# Patient Record
Sex: FEMALE | Race: WHITE | NOT HISPANIC OR LATINO | Employment: FULL TIME | URBAN - METROPOLITAN AREA
[De-identification: names, ages, dates, MRNs, and addresses within clinical notes are randomized per-mention and may not be internally consistent; named-entity substitution may affect disease eponyms.]

---
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
## 2020-02-27 DIAGNOSIS — Z85.72 HISTORY OF B-CELL LYMPHOMA: ICD-10-CM

## 2020-10-09 ENCOUNTER — OFFICE VISIT (OUTPATIENT)
Dept: PEDIATRIC HEMATOLOGY/ONCOLOGY | Facility: OUTPATIENT CENTER | Age: 20
End: 2020-10-09
Payer: MEDICAID

## 2020-10-09 ENCOUNTER — HOSPITAL ENCOUNTER (OUTPATIENT)
Facility: MEDICAL CENTER | Age: 20
End: 2020-10-09
Attending: PEDIATRICS
Payer: MEDICAID

## 2020-10-09 VITALS
TEMPERATURE: 98.9 F | SYSTOLIC BLOOD PRESSURE: 115 MMHG | HEIGHT: 63 IN | DIASTOLIC BLOOD PRESSURE: 61 MMHG | WEIGHT: 120.15 LBS | OXYGEN SATURATION: 99 % | BODY MASS INDEX: 21.29 KG/M2 | HEART RATE: 76 BPM

## 2020-10-09 DIAGNOSIS — F19.10 SUBSTANCE ABUSE (HCC): ICD-10-CM

## 2020-10-09 DIAGNOSIS — Z85.72 HISTORY OF B-CELL LYMPHOMA: ICD-10-CM

## 2020-10-09 DIAGNOSIS — M85.80 OSTEOPENIA, UNSPECIFIED LOCATION: ICD-10-CM

## 2020-10-09 DIAGNOSIS — N39.0 URINARY TRACT INFECTION WITHOUT HEMATURIA, SITE UNSPECIFIED: ICD-10-CM

## 2020-10-09 DIAGNOSIS — R10.9 FLANK PAIN: ICD-10-CM

## 2020-10-09 DIAGNOSIS — M54.50 LOW BACK PAIN, UNSPECIFIED BACK PAIN LATERALITY, UNSPECIFIED CHRONICITY, UNSPECIFIED WHETHER SCIATICA PRESENT: ICD-10-CM

## 2020-10-09 DIAGNOSIS — E55.9 HYPOVITAMINOSIS D: ICD-10-CM

## 2020-10-09 LAB
25(OH)D3 SERPL-MCNC: 32 NG/ML (ref 30–100)
ALBUMIN SERPL BCP-MCNC: 4.8 G/DL (ref 3.2–4.9)
ALBUMIN/GLOB SERPL: 2.1 G/DL
ALP SERPL-CCNC: 94 U/L (ref 30–99)
ALT SERPL-CCNC: 5 U/L (ref 2–50)
ANION GAP SERPL CALC-SCNC: 12 MMOL/L (ref 7–16)
AST SERPL-CCNC: 12 U/L (ref 12–45)
BASOPHILS # BLD AUTO: 0.2 % (ref 0–1.8)
BASOPHILS # BLD: 0.02 K/UL (ref 0–0.12)
BILIRUB SERPL-MCNC: 0.4 MG/DL (ref 0.1–1.5)
BUN SERPL-MCNC: 12 MG/DL (ref 8–22)
CALCIUM SERPL-MCNC: 9.6 MG/DL (ref 8.5–10.5)
CHLORIDE SERPL-SCNC: 104 MMOL/L (ref 96–112)
CO2 SERPL-SCNC: 21 MMOL/L (ref 20–33)
CREAT SERPL-MCNC: 0.56 MG/DL (ref 0.5–1.4)
EOSINOPHIL # BLD AUTO: 0.1 K/UL (ref 0–0.51)
EOSINOPHIL NFR BLD: 1 % (ref 0–6.9)
ERYTHROCYTE [DISTWIDTH] IN BLOOD BY AUTOMATED COUNT: 43.4 FL (ref 35.9–50)
GLOBULIN SER CALC-MCNC: 2.3 G/DL (ref 1.9–3.5)
GLUCOSE SERPL-MCNC: 83 MG/DL (ref 65–99)
HCT VFR BLD AUTO: 39.6 % (ref 37–47)
HGB BLD-MCNC: 13.1 G/DL (ref 12–16)
IMM GRANULOCYTES # BLD AUTO: 0.04 K/UL (ref 0–0.11)
IMM GRANULOCYTES NFR BLD AUTO: 0.4 % (ref 0–0.9)
LYMPHOCYTES # BLD AUTO: 4.2 K/UL (ref 1–4.8)
LYMPHOCYTES NFR BLD: 43.3 % (ref 22–41)
MCH RBC QN AUTO: 30.3 PG (ref 27–33)
MCHC RBC AUTO-ENTMCNC: 33.1 G/DL (ref 33.6–35)
MCV RBC AUTO: 91.5 FL (ref 81.4–97.8)
MONOCYTES # BLD AUTO: 0.48 K/UL (ref 0–0.85)
MONOCYTES NFR BLD AUTO: 5 % (ref 0–13.4)
NEUTROPHILS # BLD AUTO: 4.85 K/UL (ref 2–7.15)
NEUTROPHILS NFR BLD: 50.1 % (ref 44–72)
NRBC # BLD AUTO: 0 K/UL
NRBC BLD-RTO: 0 /100 WBC
PLATELET # BLD AUTO: 241 K/UL (ref 164–446)
PMV BLD AUTO: 10.7 FL (ref 9–12.9)
POTASSIUM SERPL-SCNC: 3.6 MMOL/L (ref 3.6–5.5)
PROT SERPL-MCNC: 7.1 G/DL (ref 6–8.2)
RBC # BLD AUTO: 4.33 M/UL (ref 4.2–5.4)
SODIUM SERPL-SCNC: 137 MMOL/L (ref 135–145)
WBC # BLD AUTO: 9.7 K/UL (ref 4.8–10.8)

## 2020-10-09 PROCEDURE — 80053 COMPREHEN METABOLIC PANEL: CPT

## 2020-10-09 PROCEDURE — 85025 COMPLETE CBC W/AUTO DIFF WBC: CPT

## 2020-10-09 PROCEDURE — 83520 IMMUNOASSAY QUANT NOS NONAB: CPT

## 2020-10-09 PROCEDURE — 99214 OFFICE O/P EST MOD 30 MIN: CPT | Performed by: PEDIATRICS

## 2020-10-09 PROCEDURE — 82306 VITAMIN D 25 HYDROXY: CPT

## 2020-10-09 ASSESSMENT — PATIENT HEALTH QUESTIONNAIRE - PHQ9
SUM OF ALL RESPONSES TO PHQ QUESTIONS 1-9: 5
5. POOR APPETITE OR OVEREATING: 2 - MORE THAN HALF THE DAYS
CLINICAL INTERPRETATION OF PHQ2 SCORE: 1

## 2020-10-09 ASSESSMENT — FIBROSIS 4 INDEX: FIB4 SCORE: 0.49

## 2020-10-10 NOTE — PROGRESS NOTES
Pediatric Hematology/Oncology Clinic  Progress Note      Patient Name:  Ngoc Morales  : 2000   MRN: [id number]    Location of Service: [facility]    Date of Service: 10/9/2020  Time: 3:30 PM    Primary Care Physician: None established    HISTORY OF PRESENT ILLNESS:     Chief Complaint: Follow-up of Diffuse Large B-Cell Lymphoma - now 39 months off therapy    History of Present Illness: Ngoc Morales is a 19 y.o.  female who returns to the [facility] for follow-up of of her history of Diffuse Large B-Cell Lymphoma, now 39 months off therapy.  She presents today in clinic with her mother.  Both provide accurate interval and clinical history.    Briefly, Ngoc is a previously healthy 19-year-old female who was diagnosed with Diffuse Large B-Cell Lymphoma on 2017.  She presented to medical attention with significant lower back pain and upon MRI in the ER was diagnosed with bone tumor.  A biopsy would prove confirm a diagnosis of Diffuse Large B-Cell Lymphoma.  Bilateral bone marrow staging on 2017 was performed.  She was ultimately labeled as CNS negative, CSF negative, bone marrow positive and stage IV disease.  Deanne underwent treatment as per the High Risk, Group B arm of [id number](NOS).  Her therapy was complicated by Streptococcus viridans bacteremia/sepsis as well as some fever and neutropenia.   Ngoc otherwise tolerated her therapy well and completed therapy on 2017.  She presents today for her 39-month off therapy visit.      [facility] reports that overall she has been in good health since she was last seen in 2019.  She does report that she has recently suffered from a month-long UTI for which she was seen and started therapy 3 days ago.  She reports that she was seen at [facility] where pelvic ultrasound and transvaginal ultrasound were performed to rule out inflammatory pelvic disease.  Ultimately  she was diagnosed with a simple UTI and placed on Cipro 500 mg by mouth daily for 7 days.  She reports that her sent symptoms did not include fever however did include abdominal pain.  She also reports currently left-sided flank pain.  Her urine she reports is clearing and is less cloudy.  No odor and no significant discharge.  She reports some nausea without vomiting associated with her disease as well.  Otherwise no complaints.   Ngoc will complain of occasional headaches, but denies any persistent headaches or headaches causing her to have nausea or vomiting.  She reports that she has some poor vision and has not been recently had any eye exams, but will be establishing with a primary physician soon and will have her vision checked.  She denies any issues with mouth pains or sores, sore throat or difficulty swallowing.  She does report however that her appetite has decreased significantly.  She has an occasional bout of nausea and occasional stomachaches.  Her stool habits are erratic with both constipation and diarrhea.  She has recently had a bit of diarrhea.  Abdominal discomfort and pain with current UTI otherwise none.  See Etta reports that her periods have been quite erratic following discontinuation of Depo-Provera a little over a month ago.  She also reports that incidentally it was discovered that she had a left ovarian cyst by ultrasound.  No other reproductive complaints.  She is sexually active without use of contraception and or barrier protection. Ngoc reports that she continues to have back and hip pain and that this pain is intermittent unchanged in its magnitude from prior evaluations.  She does not report that it interferes with her activities of daily living however.  She reports as well that she has had several episodes of trauma that may have provoked it to include falling from a horse and being pinned between a horse and a fence.  She is more active now than she has been previously  however she does not have any routine exercise or stretching regimen.  She does not take anything per se for the pain.  No complaints of any disability otherwise.  She has not identified any learning issues or new behavioral issues. Ngoc continues to smoke marijuana on a daily basis.  She is dating the same boyfriend as she was at her previous visit 1 year ago.  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  She is looking into employment.  She is still living with her grandfather.  Not currently in school.  No skin changes or rashes.  No other concerns or complaints at today's visit.    Review of Systems:     Constitutional: Afebrile.  Month-long UTI diagnosed and therapy initiated 3 days ago.  Decreased appetite overall.  No significant weight loss.  HENT: Negative.  Eyes: Negative for visual changes.  Poor vision in need of vision screening.  Respiratory: Negative for shortness of breath.  Cardiovascular: Negative.  Gastrointestinal: Occasional nausea and abdominal pain with recent  UTI.  Genitourinary: UTI as above.  Musculoskeletal: Back pain and hip pain as above.  Skin: Negative for rash, signs of infection.  Neurological: Negative for numbness, tingling, sensory changes, weakness or headaches.    Endo/Heme/Allergies: Does not bruise/bleed easily.    Psychiatric/Behavioral: No changes in mood, appropriate for age.     PAST MEDICAL HISTORY:     Oncology History:  Diagnosed with Diffuse Large B-Cell Lymphoma 4/11/17  Confirmed Diagnosis with bilateral bone marrow staging 4/13/17  Diffuse Large B-Cell Lymphoma, CNS -kristi, CSF -kristi, bone marrow +kristi, Stage IV  Treatment per High Risk, Group B ([id number])  Consent for treatment signed by mother 4/14/17  Pre-Phase R- started 4/14/17  Tolerated well, no TLS, only complication was LP related headache  End of R- evaluation with bilateral bone marrow biopsies/aspirates remarkable for complete/near complete  response  R-COPADM1 started 4/21/17  Complicated by Streptococcus viridans bacteremia/sepsis, hematemesis, HSV1 reactivation, oppiod induced constipation  End of R-COPADM1 evaluation with PET-CT scan remarkable for near complete response, area of focal activity in left iliac crest  Recovery of counts (COPADM1) at Day 12, start of R-COPADM2 Day 1 at R-COPADM1 Day 18  R-COPADM2 started 5/8/17  Complicated by severe opioid constipation, prolonged fever  Recovery of counts (R-COPADM2) at Day 16, start of R-CYM1 Day1 today at R-COPADM2 Day 18    No End of R-COPADM2 evaluation, next response evaluation at end of RCYM-1  R-CYM1 started 5/25/17  No complications of therapy.  Initial recovery of ANC at Day 15, subsequent drop at Day 21, true recovery at Day 27  End of R-CYM1 PET-CT scan demonstrated near complete/complete response (some very mild asymmetric activity L proximal femur)  End of R-CYM1 bone marrow biopsy and aspirate of left iliac crest negative for disease  Start of R-CYM2 6/21/17  Recovery and End of R-CYM2 6/28/17     End of Therapy 6/28/2017     Past Medical History:     1) Major Depressive Disorder  2) History of Polysubstance Abuse  3) Anxiety  4) Diffuse Large B-Cell Lymphoma  5) Prolonged Viral Illnes (Post-Rituximab)  6) Osteopenia (therapy induced)  7) recurrent urinary tract infections     Past Surgical History:      1) IR Bone Biopsy  2) Port a cath placement  3) Lumbar punctures, treatment related  4) Bilateral bone marrow biopsy x 2  5) Unilateral bone marrow biopsy x 1     Menstrual History:  Resumed menstruation, erratic, discontinued Depo-Provera just over 1 month ago     Allergies:         Allergies as of 10/31/2018   • (No Known Allergies)      Social History:   Living with grandfather, dating same boyfriend, does not have a job, not in school, smoking marijuana daily.  Not using nicotine-based products anymore.     Family History:  Maternal family history remarkable for breast cancer in  "maternal grandmother, melanoma in uncle and benign brain tumor in mother following childbirth.  No remarkable paternal family history.  No family history of pediatric disease, no family history of pediatric cancer.  No autoimmune disease, no rheumatological disease.  No bleeding, clotting disorders.    Medications:   Current Outpatient Medications on File Prior to Visit   Medication Sig Dispense Refill   • acyclovir (ZOVIRAX) 200 MG Cap Take 800 mg by mouth as needed.     • lamotrigine (LAMICTAL) 200 MG tablet Take 200 mg by mouth every day.       No current facility-administered medications on file prior to visit.        OBJECTIVE:     Vitals:   /61 (BP Location: Left arm, Patient Position: Sitting, BP Cuff Size: Child)   Pulse 76   Temp 37.2 °C (98.9 °F) (Temporal)   Ht 1.61 m (5' 3.39\")   Wt 54.5 kg (120 lb 2.4 oz)   SpO2 99%     Labs:    Hospital Outpatient Visit on 10/09/2020   Component Date Value   • 25-Hydroxy   Vitamin D 25 10/09/2020 32    • Sodium 10/09/2020 137    • Potassium 10/09/2020 3.6    • Chloride 10/09/2020 104    • Co2 10/09/2020 21    • Anion Gap 10/09/2020 12.0    • Glucose 10/09/2020 83    • Bun 10/09/2020 12    • Creatinine 10/09/2020 0.56    • Calcium 10/09/2020 9.6    • AST(SGOT) 10/09/2020 12    • ALT(SGPT) 10/09/2020 5    • Alkaline Phosphatase 10/09/2020 94    • Total Bilirubin 10/09/2020 0.4    • Albumin 10/09/2020 4.8    • Total Protein 10/09/2020 7.1    • Globulin 10/09/2020 2.3    • A-G Ratio 10/09/2020 2.1    • WBC 10/09/2020 9.7    • RBC 10/09/2020 4.33    • Hemoglobin 10/09/2020 13.1    • Hematocrit 10/09/2020 39.6    • MCV 10/09/2020 91.5    • MCH 10/09/2020 30.3    • MCHC 10/09/2020 33.1*   • RDW 10/09/2020 43.4    • Platelet Count 10/09/2020 241    • MPV 10/09/2020 10.7    • Neutrophils-Polys 10/09/2020 50.10    • Lymphocytes 10/09/2020 43.30*   • Monocytes 10/09/2020 5.00    • Eosinophils 10/09/2020 1.00    • Basophils 10/09/2020 0.20    • Immature Granulocytes " 10/09/2020 0.40    • Nucleated RBC 10/09/2020 0.00    • Neutrophils (Absolute) 10/09/2020 4.85    • Lymphs (Absolute) 10/09/2020 4.20    • Monos (Absolute) 10/09/2020 0.48    • Eos (Absolute) 10/09/2020 0.10    • Baso (Absolute) 10/09/2020 0.02    • Immature Granulocytes (a* 10/09/2020 0.04    • NRBC (Absolute) 10/09/2020 0.00    • GFR If  10/09/2020 >60    • GFR If Non  Ameri* 10/09/2020 >60      Physical Exam:    Constitutional: Well-developed, well-nourished, and in no distress.  Very well-appearing.  HENT: Normocephalic and atraumatic. No nasal congestion. Oropharynx not examined due to COVID-19.    Eyes: Conjunctivae are normal. Pupils are equal, round.  EOMI.  Nonicteric.  Neck: Normal range of motion of neck, no adenopathy.    Cardiovascular: Normal rate, regular rhythm and normal heart sounds.  No murmur heard. DP/radial pulses 2+, cap refill < 2 sec.  Pulmonary/Chest: Effort normal and breath sounds normal. No respiratory distress. Symmetric expansion.  No crackles or wheezes.  Abdomen: Soft. Bowel sounds are normal. No distension and no mass. There is no hepatosplenomegaly.    Genitourinary:  Deferred.  Musculoskeletal: Normal range of motion of lower and upper extremities bilaterally. No tenderness to palpation of elbows, wrists, hands, knees, ankles and feet bilaterally.  Moderate to severe CVA tenderness on left without any tenderness on right.  Neurological: Alert and oriented to person and place. Exhibits normal muscle tone bilaterally in upper and lower extremities. Gait normal. Coordination normal.    Skin: Skin is warm, dry and pink.  No rash or evidence of skin infection.  No pallor.   Psychiatric: Mood and affect normal for age.     ASSESSMENT AND PLAN:     Ngoc Morales is a 19 y.o. female with history of Diffuse Large B-Cell Lymphoma s/p treatment as per [id number] with Rituximab (NOT ON STUDY)     1) Diffuse Large B-Cell Lymphoma:              - Treatment as per [id number]  "(NOS), Group B - High Risk (Stage IV, CNS -kristi, CSF -kristi) + Rituximab              - Off therapy now 39 months              - WBC 9.7, Hgb 13.1, platelet 241              - ANC 4850, ALC 4200              - No physical or laboratory evidence of disease              - RTC in 6 months for 45 month off therapy evaluation     2) Monitoring Labs:              - WBC 9.7, Hgb 13.1, platelet 241              - ANC 4850, ALC 4200     3) \"Uncomplicated\" Pyelonephritis:   - Afebrile   - Mild to moderate symptoms of nausea   - Clinically significant CVA tenderness on left   - Was seen recently with ultrasound and urinalysis, diagnosed with simple UTI and treated with 7 days of Cipro 500 mg by mouth daily   - Encouraged completion of course of antibiotics   - Did discuss with patient that she absolutely needed to be seen again if she were to develop fever, worsening nausea and vomiting or worsening left-sided flank pain for concerns of complicated pyelonephritis   - No renal ultrasound obtained at this time however if symptoms not improving by Monday, will order renal ultrasound    4) History of hypovitaminosis D:              - Non-compliant with vitamin D   - Vitamin D today good at 32              - Again urged and encouraged vitamin D and calcium given history of osteopenia     5) Osteopenia:              - Yearly DEXA until improved -did not obtain this year   - We will try and schedule for repeat DEXA      - Continue calcium and vitamin D    6) Menstruation:              - Irregular menses now that Depo-Provera has been discontinued   - Scheduled to see OB/GYN on Thursday of next week   - Encouraged start of contraception given history of high risk behaviors to include unprotected sex   - Again discussed directly with patient to that she is NOT presumed to be infertile due to her chemotherapy     7) Health Maintenance:              - Patient to establish with primary care   - Patient to establish with OB/GYN   - Patient to " receive vision screening   - Patient to be updated on immunizations     8) Polysubstance Abuse:              - Continues to smoke marijuana daily   - Denies using nicotine-based products or other illicit drugs   - Encouraged cessation from marijuana     9) Weight:              - Stable weight however decreased appetite   - Discussed decreased appetite the function of chronic marijuana use   - Encouraged marijuana cessation      Disposition:  RTC 6 months for 45months off therapy PE, labs. DEXA.      Jose Alfredo Theodore MD  Pediatric Hematology / Oncology  [facility]  Cell.  [phone]  Office. [phone]

## 2020-10-12 ENCOUNTER — TELEPHONE (OUTPATIENT)
Dept: PEDIATRIC HEMATOLOGY/ONCOLOGY | Facility: OUTPATIENT CENTER | Age: 20
End: 2020-10-12

## 2020-10-12 ENCOUNTER — HOSPITAL ENCOUNTER (OUTPATIENT)
Facility: MEDICAL CENTER | Age: 20
End: 2020-10-12
Attending: PEDIATRICS
Payer: MEDICAID

## 2020-10-12 ENCOUNTER — HOSPITAL ENCOUNTER (OUTPATIENT)
Dept: RADIOLOGY | Facility: MEDICAL CENTER | Age: 20
End: 2020-10-12
Attending: PEDIATRICS
Payer: MEDICAID

## 2020-10-12 DIAGNOSIS — R10.9 FLANK PAIN: ICD-10-CM

## 2020-10-12 PROCEDURE — 76775 US EXAM ABDO BACK WALL LIM: CPT

## 2020-10-12 PROCEDURE — 81001 URINALYSIS AUTO W/SCOPE: CPT

## 2020-10-12 NOTE — TELEPHONE ENCOUNTER
"Call placed to Ngoc to check up on her symptoms over the weekend. Ngoc states that she continues to have left flank pain, now with increasing, but still intermittent,  \"jabbing\" like pains. Ngoc expresses continued concern regarding her pain during urination. Ngoc is asking whether the US Dr. Theodore ordered can be scheduled.    This MA called imaging scheduling and scheduled an US appointment on Ngoc's behalf for 1630 today. If Dr. Theodore feels that an US in not indicated at this time, the US will be cancelled.  "

## 2020-10-13 ENCOUNTER — TELEPHONE (OUTPATIENT)
Dept: PEDIATRIC HEMATOLOGY/ONCOLOGY | Facility: OUTPATIENT CENTER | Age: 20
End: 2020-10-13

## 2020-10-13 LAB
APPEARANCE UR: CLEAR
BACTERIA #/AREA URNS HPF: NEGATIVE /HPF
BILIRUB UR QL STRIP.AUTO: NEGATIVE
COLOR UR: YELLOW
EPI CELLS #/AREA URNS HPF: NEGATIVE /HPF
GLUCOSE UR STRIP.AUTO-MCNC: NEGATIVE MG/DL
HYALINE CASTS #/AREA URNS LPF: NORMAL /LPF
KETONES UR STRIP.AUTO-MCNC: NEGATIVE MG/DL
LEUKOCYTE ESTERASE UR QL STRIP.AUTO: ABNORMAL
MICRO URNS: ABNORMAL
MIS SERPL-MCNC: 0.63 NG/ML (ref 0.4–16.02)
NITRITE UR QL STRIP.AUTO: NEGATIVE
PH UR STRIP.AUTO: 6 [PH] (ref 5–8)
PROT UR QL STRIP: NEGATIVE MG/DL
RBC # URNS HPF: NORMAL /HPF
RBC UR QL AUTO: NEGATIVE
SP GR UR STRIP.AUTO: 1.01
UROBILINOGEN UR STRIP.AUTO-MCNC: 0.2 MG/DL
WBC #/AREA URNS HPF: NORMAL /HPF

## 2020-10-13 NOTE — TELEPHONE ENCOUNTER
----- Message from Jose Alfredo Tehodore M.D. sent at 10/13/2020 12:26 PM PDT -----  Urine consistent with treated UTI.  Let patient know results.  No new interventions.  Instructed Siana to seek out evaluation if new fever, increasing pain, new nausea or vomiting.

## 2021-05-21 ENCOUNTER — APPOINTMENT (OUTPATIENT)
Dept: PEDIATRIC HEMATOLOGY/ONCOLOGY | Facility: OUTPATIENT CENTER | Age: 21
End: 2021-05-21
Payer: MEDICAID

## 2021-06-25 ENCOUNTER — OFFICE VISIT (OUTPATIENT)
Dept: PEDIATRIC HEMATOLOGY/ONCOLOGY | Facility: OUTPATIENT CENTER | Age: 21
End: 2021-06-25
Payer: MEDICAID

## 2021-06-25 VITALS
BODY MASS INDEX: 18.89 KG/M2 | WEIGHT: 110.67 LBS | SYSTOLIC BLOOD PRESSURE: 114 MMHG | HEIGHT: 64 IN | OXYGEN SATURATION: 99 % | HEART RATE: 62 BPM | DIASTOLIC BLOOD PRESSURE: 66 MMHG | TEMPERATURE: 98.4 F

## 2021-06-25 DIAGNOSIS — Z85.72 HISTORY OF B-CELL LYMPHOMA: ICD-10-CM

## 2021-06-25 PROCEDURE — 99213 OFFICE O/P EST LOW 20 MIN: CPT | Performed by: PEDIATRICS

## 2021-06-25 ASSESSMENT — PATIENT HEALTH QUESTIONNAIRE - PHQ9: CLINICAL INTERPRETATION OF PHQ2 SCORE: 0

## 2021-06-25 ASSESSMENT — FIBROSIS 4 INDEX: FIB4 SCORE: 0.45

## 2021-06-28 NOTE — PROGRESS NOTES
Pediatric Hematology/Oncology Clinic  Progress Note      Patient Name:  Ngoc Morales  : 2000   MRN: [id number]    Location of Service: [facility]    Date of Service:   Time: 2:00 PM    Primary Care Physician: Jie Germain M.D.    HISTORY OF PRESENT ILLNESS:     Chief Complaint: Follow-up of Diffuse Large B-Cell Lymphoma - now 48 months off therapy     History of Present Illness: Ngoc Morales is a 20 y.o.  female who returns to the [facility] for follow-up of her history of Diffuse Large B-Cell Lymphoma, now 48 months off therapy.  She presents to clinic with her mother.  Both provide accurate interval and clinical history.    Briefly, Ngoc is a previously healthy 20-year-old female who was diagnosed with Diffuse Large B-Cell Lymphoma on 2017.  She presented to medical attention with significant lower back pain and upon MRI in the ER was diagnosed with bone tumor.  A biopsy would prove her disease with a diagnosis of Diffuse Large B-Cell Lymphoma.  Bilateral bone marrow staging on 2017 was performed and found to be positive.  Ultimately she was diagnosed with CNS negative, CSF negative, bone marrow positive and Stage IV disease. Ngoc underwent treatment as per the High Risk, Group B Arm of [id number](NOS).  Her therapy was complicated by Streptococcus viridans bacteremia/sepsis as well as some fever and neutropenia. Ngoc otherwise tolerated her therapy well and completed her course on 2017.  She presents today for her 48 months off therapy visit.    Per report, Ngoc has been clinically well without any recent or remote acute illness.  Her energy and activity are at baseline.  She does not complain of any headaches, shortness of breath or fatigue consistent with anemia.  Ngoc complains of occasional lower back pain however does not have significant back pain as she has previously.  No complaints of  any other aches and pains and no signs or symptoms concerning for relapse of disease.  Not complaining of any nausea, vomiting, abdominal pain or distention in the remote past however does report some nausea and stomach upset since her discovery that she is pregnant, now estimated to be 8 weeks of gestation. Ngoc continues to eat and drink well however and has improved her diet.  No complaints of any issues with stooling or voiding.  Menstrual cycle has obviously been absent for the past 2 months. Ngoc continues to smoke marijuana however has cut down considerably per her report.  She denies any other substance abuse to include alcohol or illicit drug use.  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  He also is employed.  They have both been doing parenting classes and reading about having a child.    No other concerns or complaints or interval findings.    Review of Systems:     Constitutional: Afebrile.  Without recent illness.  Energy and activity are good.   HENT: Negative.    Eyes: Negative for visual changes.  Respiratory: Negative for shortness of breath.  Cardiovascular: Negative.  Gastrointestinal: No abdominal complaints with the exception of recent identification of pregnancy, now thought to be 8 weeks gestation.  Genitourinary: Negative.  Musculoskeletal: Negative for joint or muscle pains.  Some lower back pain as above.  Skin: Negative for rash, signs of infection.  Neurological: Negative for numbness, tingling, sensory changes, weakness or headaches.    Endo/Heme/Allergies: Does not bruise/bleed easily.    Psychiatric/Behavioral: No changes in mood, appropriate for age.     PAST MEDICAL HISTORY:     Oncology History:  Diagnosed with Diffuse Large B-Cell Lymphoma 4/11/17  Confirmed Diagnosis with bilateral bone marrow staging 4/13/17  Diffuse Large B-Cell Lymphoma, CNS -kristi, CSF -kristi, bone marrow +kristi, Stage IV  Treatment per High Risk,  Group B ([id number])  Consent for treatment signed by mother 4/14/17  Pre-Phase R- started 4/14/17  Tolerated well, no TLS, only complication was LP related headache  End of R- evaluation with bilateral bone marrow biopsies/aspirates remarkable for complete/near complete response  R-COPADM1 started 4/21/17  Complicated by Streptococcus viridans bacteremia/sepsis, hematemesis, HSV1 reactivation, oppiod induced constipation  End of R-COPADM1 evaluation with PET-CT scan remarkable for near complete response, area of focal activity in left iliac crest  Recovery of counts (COPADM1) at Day 12, start of R-COPADM2 Day 1 at R-COPADM1 Day 18  R-COPADM2 started 5/8/17  Complicated by severe opioid constipation, prolonged fever  Recovery of counts (R-COPADM2) at Day 16, start of R-CYM1 Day1 today at R-COPADM2 Day 18    No End of R-COPADM2 evaluation, next response evaluation at end of RCYM-1  R-CYM1 started 5/25/17  No complications of therapy.  Initial recovery of ANC at Day 15, subsequent drop at Day 21, true recovery at Day 27  End of R-CYM1 PET-CT scan demonstrated near complete/complete response (some very mild asymmetric activity L proximal femur)  End of R-CYM1 bone marrow biopsy and aspirate of left iliac crest negative for disease  Start of R-CYM2 6/21/17  Recovery and End of R-CYM2 6/28/17     End of Therapy 6/28/2017     Past Medical History:     1) Major Depressive Disorder  2) History of Polysubstance Abuse  3) Anxiety  4) Diffuse Large B-Cell Lymphoma  5) Prolonged Viral Illnes (Post-Rituximab)  6) Osteopenia (therapy induced)  7) Recurrent urinary tract infections  8) Pregnancy    Past Surgical History:      1) IR Bone Biopsy  2) Port a cath placement  3) Lumbar punctures, treatment related  4) Bilateral bone marrow biopsy x 2  5) Unilateral bone marrow biopsy x 1     Menstrual History:  No longer having menstrual periods, 8 weeks pregnant     Allergies:         Allergies as of 10/31/2018   • (No Known  "Allergies)      Social History:   Living with boyfriend.  Pregnant.  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  Decreased marijuana use.     Family History:  Maternal family history remarkable for breast cancer in maternal grandmother, melanoma in uncle and benign brain tumor in mother following childbirth.  No remarkable paternal family history.  No family history of pediatric disease, no family history of pediatric cancer.  No autoimmune disease, no rheumatological disease.  No bleeding, clotting disorders.     Medications:   Current Outpatient Medications on File Prior to Visit   Medication Sig Dispense Refill   • acyclovir (ZOVIRAX) 200 MG Cap Take 800 mg by mouth as needed.     • lamotrigine (LAMICTAL) 200 MG tablet Take 200 mg by mouth every day.       No current facility-administered medications on file prior to visit.         OBJECTIVE:     Vitals:   /66 (BP Location: Left arm, Patient Position: Sitting, BP Cuff Size: Small adult)   Pulse 62   Temp 36.9 °C (98.4 °F) (Temporal)   Ht 1.62 m (5' 3.78\")   Wt 50.2 kg (110 lb 10.7 oz)   SpO2 99%     Labs:    No labs today, will review CBC and BMP/CMP when obtained for prenatal labs.    Physical Exam:    Constitutional: Well-developed, well-nourished, and in no distress.  Very well-appearing.  HENT: Normocephalic and atraumatic. No nasal congestion or rhinorrhea. Oropharynx is clear and moist. No oral ulcerations or sores.    Eyes: Conjunctivae are normal. Pupils are equal, round.  EOMI.  Nonicteric.Neck: Normal range of motion of neck, no adenopathy.    Cardiovascular: Normal rate, regular rhythm and normal heart sounds.  No murmur heard. DP/radial pulses 2+, cap refill < 2 sec.  Pulmonary/Chest: Effort normal and breath sounds normal. No respiratory distress. Symmetric expansion.  No crackles or wheezes.  Abdomen: Soft. Bowel sounds are normal.  No distention.    Genitourinary:  Deferred  Musculoskeletal: Normal range of motion of lower and upper extremities " bilaterally.   Neurological: Alert and oriented to person and place. Exhibits normal muscle tone bilaterally in upper and lower extremities. Gait normal. Coordination normal.    Skin: Skin is warm, dry and pink.  No rash or evidence of skin infection.  No pallor.   Psychiatric: Mood and affect normal for age.    ASSESSMENT AND PLAN:     Ngoc Morales is a 20 y.o. female with history of Diffuse Large B-Cell Lymphoma s/p treatment as per [id number] with Rituximab (NOT ON STUDY)     1) Diffuse Large B-Cell Lymphoma:              - Treatment as per [id number] (NOS), Group B - High Risk (Stage IV, CNS -kristi, CSF -kristi) + Rituximab              - Off therapy now 48 months              - No new labs today as we will await prenatal labs   - Continue with yearly CBC, CMP              - No physical or laboratory evidence of disease              - RTC in 12 months for 60 months off therapy evaluation and survivorship     2) Monitoring Labs:              - None obtained today    3) Survivorship/Late Effects Monitoring:   - Cognitive: No identified cognitive deficits or learning disabilities.   - Psychosocial:  No changes in behavior.   - Renal:  /66.  Electrolytes not obtained at this visit, but will review prenatal labs.   - Cardiac: 120 mg/m2 cumulative dose of doxorubicin.  No radiation.  Given low-dose anthracycline without radiation and age at treatment, recommendation for ECHO every 5 years.  Next ECHO in 2022.    - Pulmonary:  No chest/mediastinal radiation, no exposure to chemotherapies with pulmonary toxicity.   - Musckuloskeletal: Can encouraged vitamin D and calcium especially given current pregnancy.  No DEXA while pregnant however getting a DEXA scan following pregnancy would be advised to follow osteopenia.   - Growth and Development: No concerns.   - Reproductive: Currently pregnant.   - Lansky: 100    4) History of hypovitaminosis D:              - Non-compliant with vitamin D   - Re encouraged vitamin D and  calcium supplementation given history of osteopenia       5) Osteopenia:              - Had previously recommended yearly DEXA scan until improvement in osteopenia however has not had any recent DEXA scans   - Will not obtain DEXA until after pregnancy              - Continue calcium and vitamin D     6) Pregnancy:              - Currently 8 weeks pregnant.   - Discussed pregnancy in the context of prior diagnosis of Diffuse Large B-Cell Lymphoma   - Discussed cardiac toxicity with anthracyclines however relatively low doses and do not expect any complications of pregnancy related to doxorubicin exposure   - Do not feel high risk pregnancy/OB is necessary given past medical history however did express the importance of routine prenatal care     7) Polysubstance Abuse:              - Decreased marijuana use   - Encouraged abstinence of all substances while pregnant and after     Disposition:  RTC 12 months for 5 years off therapy PE, labs. ECHO. DEXA.     Jose Alfredo Theodore MD  Pediatric Hematology / Oncology  [facility]  Cell.  [phone]  Office. [phone]

## 2021-09-04 NOTE — PROGRESS NOTES
Pediatric Hematology/Oncology  Daily Progress Note      Patient Name:  Ngoc Morales  : 2000  MRN: [id number]    Location of Service:  [facility] - Pediatric Intensive Care Unit  Date of Service: 2017  Time: 10:44 AM    Hospital Day: 42    Protocol / Treatment Plan:  As per [id number], High Risk Group B, Induction 2, COPADM2, Day 11    SUBJECTIVE:     Low grade fever on Day 9-10.  Started on empiric meropenem and vancomycin yesterday.  Tolerated start of antibiotics well without any drop in blood pressures.  Blood cultures were drawn and are pending.  Overnight, NG placed as mucositis pain was increasing and PO continued to decrease.  Tolerated NG placement without complications.  Complains this AM of significant increase in mucositis pain in mouth and throat primarily.  Abdominal pain present, but not primary pain.  Increased nausea overnight and discomfort due to abdominal fullness.  Lactulose given, but still not stooling.  No new rashes or skin breakdown.  No headaches, no epistaxis.    Review of Systems:     Constitutional: Febrile to 38.7.  Decreased energy.  HENT: Negative for auditory changes or ear pain, no nosebleeds, worsening mouth and throat pain again today. NG placed overnight.  Eyes: Negative for visual changes.  Respiratory: Negative for shortness of breath or noisy breathing.   Cardiovascular: Negative for extremity swelling.  Gastrointestinal:  Increased nausea overnight.  Lower right abdominal pain.   Constipated x6 days.  Genitourinary: Negative for dysuria.   Musculoskeletal: Negative for musculoskeletal pain.  Skin: No signs of infection.  Rash on left leg improved.  Desquamated hands/fingers.  Neurological: Negative for numbness, tingling, sensory changes, or headaches.    Endo/Heme/Allergies: No bruising/bleeding easily.    Psychiatric/Behavioral: Depressed.    OBJECTIVE:     Max Temp: Temp (24hrs), Av °C (100.4 °F), Min:37.3 °C (99.1 °F), Max:38.7 °C (101.7  Pt belongings placed in locker [address] [place] [address]  09/04/21 9024 "°F)    Vitals: /79 mmHg  Pulse 126  Temp(Src) 38.4 °C (101.1 °F)  Resp 14  Ht 1.59 m (5' 2.6\")  Wt 51.1 kg (112 lb 10.5 oz)  BMI 20.21 kg/m2  SpO2 95%  LMP   Breastfeeding? No    Intake/Output Summary (Last 24 hours) at 05/18/17 2246  Last data filed at 05/2000   Gross per 24 hour   Intake 4289.85 ml   Output   2400 ml   Net 1889.85 ml     Weight up 2 lbs    Labs:     5/18/2017    WBC 0.2 (LL)   RBC 3.06 (L)   Hemoglobin 8.5 (L)   Hematocrit 25.5 (L)   MCV 83.3   MCH 27.8   MCHC 33.3 (L)   RDW 46.7 (H)   Platelet Count 240   MPV 9.5   Neutrophils (Absolute) Cancel   Nucleated RBC 0.00   NRBC (Absolute) 0.00   Sodium 134 (L)   Potassium 3.9   Chloride 102   Co2 25   Anion Gap 7.0   Glucose 103 (H)   Bun 6 (L)   Creatinine 0.50   Calcium 8.5   AST(SGOT) 13   ALT(SGPT) 28   Alkaline Phosphatase 75   Total Bilirubin 0.4   Albumin 3.0 (L)   Total Protein 5.4 (L)   Globulin 2.4   A-G Ratio 1.3     ANC: 0    Physical Examination:    Constitutional: Well-developed, well-nourished, in no distress. Moderate distress due to pain.  HENT: Normocephalic and atraumatic. No nasal congestion or rhinorrhea.  NG placed.   Oropharynx with worsening mucositis, but no vesicles or ulcerations.  Breakdown around gums.  Eyes: Conjunctivae are normal. Pupils are equal, round, and reactive to light.  EOMI.  Neck: Normal range of motion of neck, no adenopathy.    Cardiovascular: Normal rate, regular rhythm and normal heart sounds.  2/6 systolic murmur heard. DP/radial pulses 2+, cap refill < 2 sec  Pulmonary/Chest: Effort normal and breath sounds normal. No respiratory distress. Symmetric expansion.  No crackles or wheezes.  Abdomen: Soft. Bowel sounds are normal.  There is no hepatosplenomegaly. Tender to palpation lower right quadrant.  No peritoneal signs.  Genitourinary:  Deferred.  Musculoskeletal: Normal range of motion of lower and upper extremities bilaterally. No tenderness to palpation of elbows, wrists, hands.  "    Lymphadenopathy: No cervical adenopathy, axillary adenopathy or inguinal adenopathy.   Neurological: Alert and oriented to person and place.    Skin: Skin is warm, dry and pink.  No  evidence of infection.  Port C/D/I.  No rash.  Fingers desquamated.  Rash on left calf, further improved.  Mood:  Appropriate for age.      ASSESSMENT AND PLAN:     Ngoc Morales is a 16 y.o. female with newly diagnosed Diffuse Large B-Cell Lymphoma    1) Diffuse Large B-Cell Lymphoma:                    Treatment as per [id number] (NOS), Group B - High Risk (Stage IV, CNS -kristi, CSF -kristi) + Rituximab               Induction II, R-COPADM2, Day 11:              - COPADM2 chemotherapy completed              - Yunior today with ANC 0 today    2) Febrile Neutropenia:   - Febrile to 38.7   - Blood cultures NGTD   - Empiric meropenem 1 gram IV Q8H started (Day 2)   - Vancomycin 800 mg Q8H for 48H rule out   - Restarted acyclovir 250 mg IV Q8H   - Restarted micafungin 100 mg Q24H   - ID following                          3) Chemotherapy Induced Pancytopenia :              - Hgb 8.5, asymptomatic              - Transfuse for Hgb < 7 or symptomatic, CMV-safe, irradiated - no indication for transfusion currently              - Platelets 240 K              - Transfuse for platelets < 10K or symtpmatic, CMV-safe, irradiated - no indication for transfusion currently              - ANC 0    4) Pharyngitis/Mucositis:              - Oral mucositis/pain worsening, visually worse appearing mucositis in mouth              - Oral hygiene, chlorhexadine (Peridex) mouth rinse              - Continue Ana's Magic Mouthwash PRN / Cepacol PRN              - Morphine PCA with basal 3 mg/hr and keeping 2 mg Q15 min bolus will titrate as needed to meed pain needs    5) Lower Right Quadrant Abdominal Pain:              - Tender to palpation              - No peritoneal signs              - Constipation versus typhlitis   - No on broad spectrum antibiotics   -  Imaging as needed    6) At Risk for Opportunistic Pulmonary Infection:              - Pentamidine given 4/29/17 for PJP Ppx              - Next dose to be scheduled 5/29    7) Nutrition:              - NG tube placed   - Fibersource HN at 35 mL/h              - Dietician working closely with patient    8) FEN/GI:              - Continue with D5 1/2 NS + 20 mEq KCl at 135 mL/h                - Special attention to renal function while on valacyclovir     - Monitor potassium while on micafungin    9) Opioid and Vincristine Induced Constipation:              - Continue with Miralax bowel regimen              - Continue Colace              - Lactulose given overnight through NG    10) Psychiatric:              - Psychiatry involved              - Mirtazepine 15 mg PO QHS              - Lamotrigine 200 mg PO Daily              - Ativan PRN for anxiety              - Marinol 2.5 mg PO BID (appetite, antiemetic, mood)               11) Social:              - Social work involved    Jose Alfredo Theodore MD  Pediatric Hematology / Oncology  [facility]  Cell.  [phone]  Office. [phone]

## 2021-09-30 NOTE — DISCHARGE INSTRUCTIONS
PATIENT INSTRUCTIONS:      Given by:   Nurse    Instructed in:  If yes, include date/comment and person who did the instructions       A.D.L:       Yes                Activity:      Yes           Diet::          Yes           Medication:  Yes    Equipment:  NA    Treatment:  NA      Other:          Yes  Return to ER if Symptoms return. Follow up with Dr. Theodore as instructed.     Education Class:  NA    Patient/Family verbalized/demonstrated understanding of above Instructions:  yes  __________________________________________________________________________    OBJECTIVE CHECKLIST  Patient/Family has:    All medications brought from home   NA  Valuables from safe                            NA  Prescriptions                                       Yes  All personal belongings                       Yes  Equipment (oxygen, apnea monitor, wheelchair)     NA  Other: NA    ___________________________________________________________________________  Instructed On:    Car/booster seat:  Rear facing until 1 year old and 20 lbs                NA  45' angle rear facing/90' angle forward facing    NA  Child secure in seat (harness tight)                    NA  Car seat secure in vehicle (1 inch rule)              NA  C for correct, O for oops                                     NA  Registration card/C.H.A.D. Sticker                     NA  For information on free car seat safety inspections, please call [facility] at [phone]-[phone]  __________________________________________________________________________  Discharge Survey Information  You may be receiving a survey from [facility].  Our goal is to provide the best patient care in the nation.  With your input, we can achieve this goal.    Which Discharge Education Sheets Provided: NA    Rehabilitation Follow-up: NA    Special Needs on Discharge (Specify) NA      Type of Discharge: Order  Mode of Discharge:  walking  Method of Transportation:Private Car  Destination:   home  Transfer:  Referral Form:   No  Agency/Organization:  Accompanied by:  Specify relationship under 18 years of age) Mom    Discharge date:  6/6/2017    11:08 AM    Depression / Suicide Risk    As you are discharged from this [facility] facility, it is important to learn how to keep safe from harming yourself.    Recognize the warning signs:  · Abrupt changes in personality, positive or negative- including increase in energy   · Giving away possessions  · Change in eating patterns- significant weight changes-  positive or negative  · Change in sleeping patterns- unable to sleep or sleeping all the time   · Unwillingness or inability to communicate  · Depression  · Unusual sadness, discouragement and loneliness  · Talk of wanting to die  · Neglect of personal appearance   · Rebelliousness- reckless behavior  · Withdrawal from people/activities they love  · Confusion- inability to concentrate     If you or a loved one observes any of these behaviors or has concerns about self-harm, here's what you can do:  · Talk about it- your feelings and reasons for harming yourself  · Remove any means that you might use to hurt yourself (examples: pills, rope, extension cords, firearm)  · Get professional help from the community (Mental Health, Substance Abuse, psychological counseling)  · Do not be alone:Call your Safe Contact- someone whom you trust who will be there for you.  · Call your local CRISIS HOTLINE 069-7673 or 982-740-4909  · Call your local Children's Mobile Crisis Response Team [place] (474) 790-9396 or www.[employer]  · Call the toll free National Suicide Prevention Hotlines   · National Suicide Prevention Lifeline 256-193-JTLR (4925)  · National Hope Line Network 800-SUICIDE (151-2961)             calagros/yes

## 2022-01-19 ENCOUNTER — HOSPITAL ENCOUNTER (EMERGENCY)
Facility: MEDICAL CENTER | Age: 22
End: 2022-01-19
Attending: OBSTETRICS & GYNECOLOGY | Admitting: OBSTETRICS & GYNECOLOGY
Payer: COMMERCIAL

## 2022-01-19 VITALS
HEART RATE: 85 BPM | TEMPERATURE: 97.5 F | DIASTOLIC BLOOD PRESSURE: 87 MMHG | SYSTOLIC BLOOD PRESSURE: 134 MMHG | OXYGEN SATURATION: 98 % | RESPIRATION RATE: 18 BRPM

## 2022-01-19 LAB
FLUAV RNA SPEC QL NAA+PROBE: NEGATIVE
FLUBV RNA SPEC QL NAA+PROBE: NEGATIVE
RSV RNA SPEC QL NAA+PROBE: NEGATIVE
SARS-COV-2 RNA RESP QL NAA+PROBE: NOTDETECTED
SPECIMEN SOURCE: NORMAL

## 2022-01-19 PROCEDURE — 59025 FETAL NON-STRESS TEST: CPT

## 2022-01-19 PROCEDURE — 302449 STATCHG TRIAGE ONLY (STATISTIC)

## 2022-01-19 PROCEDURE — C9803 HOPD COVID-19 SPEC COLLECT: HCPCS | Performed by: OBSTETRICS & GYNECOLOGY

## 2022-01-19 PROCEDURE — 0241U HCHG SARS-COV-2 COVID-19 NFCT DS RESP RNA 4 TRGT MIC: CPT

## 2022-01-19 NOTE — PROGRESS NOTES
+FM, denies LOF, VB or UC's    PT presenting with CO body aches, sore throat and malaise.      Spoke to Dr Monsalve, order for covid/flu test.    1045 md IN DEPARTMENT, TRACING REVIEWED, OKAY TO DC    PT to return for decreased FM, LOF, VB or UC's. Covid precautions verbalized.

## 2022-01-19 NOTE — H&P
"DATE OF ADMISSION:  2022     OBSERVATION HISTORY AND PHYSICAL     IDENTIFICATION:  This is a 21-year-old  2, para 0-0-1-0 with an EDC of   2022, EGA of 37 weeks who presents with a chief complaint of \"I may have   COVID.\"     HISTORY OF PRESENT ILLNESS:  This is a patient of Dr. Karimi who has gotten   good prenatal care.  She has been followed by [facility], but her care   with them was recently discontinued.  She has been getting twice weekly   testing in the office, states that she had COVID in the first trimester and   now has symptoms of COVID including fatigue, stuffy nose and a mild cough.    She denies shortness of breath, nausea, vomiting, fever, chills, change in   bowel or bladder habits.  Admits good fetal movement.  Denies symptoms of PIH.    Here in labor and delivery, fetal heart tracings reactive, category 1.  She   is having an occasional irregular contraction.  Her PCR rapid test is pending.    She does have followup tomorrow in the office with Dr. Karimi for an NST.     OBSTETRICAL HISTORY:  Significant for previous spontaneous .     GYNECOLOGIC HISTORY:  History of chlamydia.     MEDICAL HISTORY:  ALLERGIES:  None.     CURRENT MEDICATIONS:  Prenatal vitamins.     MEDICAL PROBLEMS:  History of B-cell lymphoma, in remission since 2017.     SURGICAL HISTORY:  Port placement and removal.     SOCIAL HISTORY:  Denies alcohol, tobacco or drug abuse.     FAMILY HISTORY:  Noncontributory.     REVIEW OF SYSTEMS:  Times 12 is negative per AMA standards available in chart.     LABORATORY DATA:  She is A positive.  Antibody negative.  Rubella nonimmune.    States she had a normal 1-hour.  Her GBS is pending.     PHYSICAL EXAMINATION:  VITAL SIGNS:  The patient is afebrile.  Vital signs within normal limits.    Fetal heart tracings reactive, category 1.  She is having occasional irregular   contraction.  GENERAL:  She is awake, alert, no apparent distress.  NECK:  " Supple.  HEART:  Regular.  CHEST:  Clear.  BREASTS:  Symmetrical.  ABDOMEN:  Soft, gravid, size appropriate for dates.  EXTREMITIES:  Negative.  GYNECOLOGIC:  Deferred.     ASSESSMENT:  At this time:  1.  Pregnancy at 37 weeks.  2.  Upper respiratory tract infection, rule out COVID.  3.  Fetal status reassuring.     PLAN:  At this time, we will discharge her home pending her PCR COVID test.  I   have given her precautions, kick counts.  If her COVID is negative, she will   follow up with Dr. Karimi tomorrow as planned.  If it is positive, she will   isolate, come back to labor and delivery with change in her status or fetal   status.        ______________________________  Juan Ramon Barrios MD    PBD/JAN    DD:  01/19/2022 10:42  DT:  01/19/2022 10:57    Job#:  [id number]

## 2022-01-19 NOTE — DISCHARGE INSTRUCTIONS
General Instructions:  · If you think you are in labor, time contractions (lying on your left side) from the beginning of one contraction to the beginning of the next contraction for at least one hour.  · Increase fluid intake: you should consume 10-12 8 oz glasses of non-caffeinated fluid per day.  · Report any pressure or burning on urination to your physician.  · Monitor fetal movement: If you notice an absence or decrease in fetal movement, drink a large glass of water and rest on your side.  If there is no increase in movement, call your physician or go to the hospital for further evaluation.  · Report any sudden, sharp abdominal pain.  · Report any bleeding.  Spotting or pinkish discharge is normal after vaginal exam.  You may also spot after sexual intercourse.    Labor Instructions (37 - 39 weeks):  Call your physician or return to hospital if:  · You have regular contractions that get progressively closer, longer and stronger.  · Your water breaks (remember time and color).  · You have bleeding like a period.  · Your baby does not move enough to complete the daily kick counts (10 movements in 2 hours)  · Your baby moves much less often than on the days before or you have not felt your baby move all day.      Other Instructions:      COVID-19  COVID-19 is a respiratory infection that is caused by a virus called severe acute respiratory syndrome coronavirus 2 (SARS-CoV-2). The disease is also known as coronavirus disease or novel coronavirus. In some people, the virus may not cause any symptoms. In others, it may cause a serious infection. The infection can get worse quickly and can lead to complications, such as:  · Pneumonia, or infection of the lungs.  · Acute respiratory distress syndrome or ARDS. This is fluid build-up in the lungs.  · Acute respiratory failure. This is a condition in which there is not enough oxygen passing from the lungs to the body.  · Sepsis or septic shock. This is a serious bodily  reaction to an infection.  · Blood clotting problems.  · Secondary infections due to bacteria or fungus.  The virus that causes COVID-19 is contagious. This means that it can spread from person to person through droplets from coughs and sneezes (respiratory secretions).  What are the causes?  This illness is caused by a virus. You may catch the virus by:  · Breathing in droplets from an infected person's cough or sneeze.  · Touching something, like a table or a doorknob, that was exposed to the virus (contaminated) and then touching your mouth, nose, or eyes.  What increases the risk?  Risk for infection  You are more likely to be infected with this virus if you:  · Live in or travel to an area with a COVID-19 outbreak.  · Come in contact with a sick person who recently traveled to an area with a COVID-19 outbreak.  · Provide care for or live with a person who is infected with COVID-19.  Risk for serious illness  You are more likely to become seriously ill from the virus if you:  · Are 65 years of age or older.  · Have a long-term disease that lowers your body's ability to fight infection (immunocompromised).  · Live in a nursing home or long-term care facility.  · Have a long-term (chronic) disease such as:  ? Chronic lung disease, including chronic obstructive pulmonary disease or asthma  ? Heart disease.  ? Diabetes.  ? Chronic kidney disease.  ? Liver disease.  · Are obese.  What are the signs or symptoms?  Symptoms of this condition can range from mild to severe. Symptoms may appear any time from 2 to 14 days after being exposed to the virus. They include:  · A fever.  · A cough.  · Difficulty breathing.  · Chills.  · Muscle pains.  · A sore throat.  · Loss of taste or smell.  Some people may also have stomach problems, such as nausea, vomiting, or diarrhea.  Other people may not have any symptoms of COVID-19.  How is this diagnosed?  This condition may be diagnosed based on:  · Your signs and symptoms,  especially if:  ? You live in an area with a COVID-19 outbreak.  ? You recently traveled to or from an area where the virus is common.  ? You provide care for or live with a person who was diagnosed with COVID-19.  · A physical exam.  · Lab tests, which may include:  ? A nasal swab to take a sample of fluid from your nose.  ? A throat swab to take a sample of fluid from your throat.  ? A sample of mucus from your lungs (sputum).  ? Blood tests.  · Imaging tests, which may include, X-rays, CT scan, or ultrasound.  How is this treated?  At present, there is no medicine to treat COVID-19. Medicines that treat other diseases are being used on a trial basis to see if they are effective against COVID-19.  Your health care provider will talk with you about ways to treat your symptoms. For most people, the infection is mild and can be managed at home with rest, fluids, and over-the-counter medicines.  Treatment for a serious infection usually takes places in a hospital intensive care unit (ICU). It may include one or more of the following treatments. These treatments are given until your symptoms improve.  · Receiving fluids and medicines through an IV.  · Supplemental oxygen. Extra oxygen is given through a tube in the nose, a face mask, or a horton.  · Positioning you to lie on your stomach (prone position). This makes it easier for oxygen to get into the lungs.  · Continuous positive airway pressure (CPAP) or bi-level positive airway pressure (BPAP) machine. This treatment uses mild air pressure to keep the airways open. A tube that is connected to a motor delivers oxygen to the body.  · Ventilator. This treatment moves air into and out of the lungs by using a tube that is placed in your windpipe.  · Tracheostomy. This is a procedure to create a hole in the neck so that a breathing tube can be inserted.  · Extracorporeal membrane oxygenation (ECMO). This procedure gives the lungs a chance to recover by taking over the  functions of the heart and lungs. It supplies oxygen to the body and removes carbon dioxide.  Follow these instructions at home:  Lifestyle  · If you are sick, stay home except to get medical care. Your health care provider will tell you how long to stay home. Call your health care provider before you go for medical care.  · Rest at home as told by your health care provider.  · Do not use any products that contain nicotine or tobacco, such as cigarettes, e-cigarettes, and chewing tobacco. If you need help quitting, ask your health care provider.  · Return to your normal activities as told by your health care provider. Ask your health care provider what activities are safe for you.  General instructions  · Take over-the-counter and prescription medicines only as told by your health care provider.  · Drink enough fluid to keep your urine pale yellow.  · Keep all follow-up visits as told by your health care provider. This is important.  How is this prevented?    There is no vaccine to help prevent COVID-19 infection. However, there are steps you can take to protect yourself and others from this virus.  To protect yourself:   · Do not travel to areas where COVID-19 is a risk. The areas where COVID-19 is reported change often. To identify high-risk areas and travel restrictions, check the CDC travel website: wwwnc.cdc.gov/travel/notices  · If you live in, or must travel to, an area where COVID-19 is a risk, take precautions to avoid infection.  ? Stay away from people who are sick.  ? Wash your hands often with soap and water for 20 seconds. If soap and water are not available, use an alcohol-based hand .  ? Avoid touching your mouth, face, eyes, or nose.  ? Avoid going out in public, follow guidance from your state and local health authorities.  ? If you must go out in public, wear a cloth face covering or face mask.  ? Disinfect objects and surfaces that are frequently touched every day. This may  include:  § Counters and tables.  § Doorknobs and light switches.  § Sinks and faucets.  § Electronics, such as phones, remote controls, keyboards, computers, and tablets.  To protect others:  If you have symptoms of COVID-19, take steps to prevent the virus from spreading to others.  · If you think you have a COVID-19 infection, contact your health care provider right away. Tell your health care team that you think you may have a COVID-19 infection.  · Stay home. Leave your house only to seek medical care. Do not use public transport.  · Do not travel while you are sick.  · Wash your hands often with soap and water for 20 seconds. If soap and water are not available, use alcohol-based hand .  · Stay away from other members of your household. Let healthy household members care for children and pets, if possible. If you have to care for children or pets, wash your hands often and wear a mask. If possible, stay in your own room, separate from others. Use a different bathroom.  · Make sure that all people in your household wash their hands well and often.  · Cough or sneeze into a tissue or your sleeve or elbow. Do not cough or sneeze into your hand or into the air.  · Wear a cloth face covering or face mask.  Where to find more information  · Centers for Disease Control and Prevention: www.cdc.gov/coronavirus/2019-ncov/index.html  · World Health Organization: www.who.int/health-topics/coronavirus  Contact a health care provider if:  · You live in or have traveled to an area where COVID-19 is a risk and you have symptoms of the infection.  · You have had contact with someone who has COVID-19 and you have symptoms of the infection.  Get help right away if:  · You have trouble breathing.  · You have pain or pressure in your chest.  · You have confusion.  · You have bluish lips and fingernails.  · You have difficulty waking from sleep.  · You have symptoms that get worse.  These symptoms may represent a serious  problem that is an emergency. Do not wait to see if the symptoms will go away. Get medical help right away. Call your local emergency services (911 in the [place] Do not drive yourself to the hospital. Let the emergency medical personnel know if you think you have COVID-19.  Summary  · COVID-19 is a respiratory infection that is caused by a virus. It is also known as coronavirus disease or novel coronavirus. It can cause serious infections, such as pneumonia, acute respiratory distress syndrome, acute respiratory failure, or sepsis.  · The virus that causes COVID-19 is contagious. This means that it can spread from person to person through droplets from coughs and sneezes.  · You are more likely to develop a serious illness if you are 65 years of age or older, have a weak immunity, live in a nursing home, or have chronic disease.  · There is no medicine to treat COVID-19. Your health care provider will talk with you about ways to treat your symptoms.  · Take steps to protect yourself and others from infection. Wash your hands often and disinfect objects and surfaces that are frequently touched every day. Stay away from people who are sick and wear a mask if you are sick.  This information is not intended to replace advice given to you by your health care provider. Make sure you discuss any questions you have with your health care provider.  Document Released: 01/23/2020 Document Revised: 05/14/2020 Document Reviewed: 01/23/2020  [employer] Patient Education © 2020 [employer]    Please carefully review your entire AFTER VISIT SUMMARY document for all discharge instructions.

## 2022-01-21 ENCOUNTER — APPOINTMENT (OUTPATIENT)
Dept: OBGYN | Facility: MEDICAL CENTER | Age: 22
End: 2022-01-21
Attending: OBSTETRICS & GYNECOLOGY
Payer: COMMERCIAL

## 2022-01-21 ENCOUNTER — HOSPITAL ENCOUNTER (INPATIENT)
Facility: MEDICAL CENTER | Age: 22
LOS: 2 days | End: 2022-01-24
Attending: OBSTETRICS & GYNECOLOGY | Admitting: OBSTETRICS & GYNECOLOGY
Payer: COMMERCIAL

## 2022-01-21 DIAGNOSIS — G89.18 POSTOPERATIVE PAIN: ICD-10-CM

## 2022-01-21 LAB
BASOPHILS # BLD AUTO: 0.1 % (ref 0–1.8)
BASOPHILS # BLD: 0.01 K/UL (ref 0–0.12)
EOSINOPHIL # BLD AUTO: 0.01 K/UL (ref 0–0.51)
EOSINOPHIL NFR BLD: 0.1 % (ref 0–6.9)
ERYTHROCYTE [DISTWIDTH] IN BLOOD BY AUTOMATED COUNT: 46.2 FL (ref 35.9–50)
HCT VFR BLD AUTO: 34.8 % (ref 37–47)
HGB BLD-MCNC: 11.4 G/DL (ref 12–16)
HOLDING TUBE BB 8507: NORMAL
IMM GRANULOCYTES # BLD AUTO: 0.03 K/UL (ref 0–0.11)
IMM GRANULOCYTES NFR BLD AUTO: 0.4 % (ref 0–0.9)
LYMPHOCYTES # BLD AUTO: 1.4 K/UL (ref 1–4.8)
LYMPHOCYTES NFR BLD: 18 % (ref 22–41)
MCH RBC QN AUTO: 28.2 PG (ref 27–33)
MCHC RBC AUTO-ENTMCNC: 32.8 G/DL (ref 33.6–35)
MCV RBC AUTO: 86.1 FL (ref 81.4–97.8)
MONOCYTES # BLD AUTO: 0.54 K/UL (ref 0–0.85)
MONOCYTES NFR BLD AUTO: 6.9 % (ref 0–13.4)
NEUTROPHILS # BLD AUTO: 5.8 K/UL (ref 2–7.15)
NEUTROPHILS NFR BLD: 74.5 % (ref 44–72)
NRBC # BLD AUTO: 0 K/UL
NRBC BLD-RTO: 0 /100 WBC
PLATELET # BLD AUTO: 227 K/UL (ref 164–446)
PMV BLD AUTO: 11.5 FL (ref 9–12.9)
RBC # BLD AUTO: 4.04 M/UL (ref 4.2–5.4)
WBC # BLD AUTO: 7.8 K/UL (ref 4.8–10.8)

## 2022-01-21 PROCEDURE — 700105 HCHG RX REV CODE 258: Performed by: OBSTETRICS & GYNECOLOGY

## 2022-01-21 PROCEDURE — 700111 HCHG RX REV CODE 636 W/ 250 OVERRIDE (IP): Performed by: OBSTETRICS & GYNECOLOGY

## 2022-01-21 PROCEDURE — 85025 COMPLETE CBC W/AUTO DIFF WBC: CPT

## 2022-01-21 PROCEDURE — 36415 COLL VENOUS BLD VENIPUNCTURE: CPT

## 2022-01-21 RX ORDER — SODIUM CHLORIDE 9 MG/ML
INJECTION, SOLUTION INTRAVENOUS
Status: COMPLETED
Start: 2022-01-21 | End: 2022-01-22

## 2022-01-21 RX ORDER — PENICILLIN G POTASSIUM 5000000 [IU]/1
5 INJECTION, POWDER, FOR SOLUTION INTRAMUSCULAR; INTRAVENOUS ONCE
Status: COMPLETED | OUTPATIENT
Start: 2022-01-22 | End: 2022-01-21

## 2022-01-21 RX ORDER — SODIUM CHLORIDE, SODIUM LACTATE, POTASSIUM CHLORIDE, CALCIUM CHLORIDE 600; 310; 30; 20 MG/100ML; MG/100ML; MG/100ML; MG/100ML
INJECTION, SOLUTION INTRAVENOUS CONTINUOUS
Status: DISCONTINUED | OUTPATIENT
Start: 2022-01-22 | End: 2022-01-22

## 2022-01-21 RX ADMIN — PENICILLIN G POTASSIUM 5 MILLION UNITS: 5000000 POWDER, FOR SOLUTION INTRAMUSCULAR; INTRAPLEURAL; INTRATHECAL; INTRAVENOUS at 23:58

## 2022-01-21 RX ADMIN — SODIUM CHLORIDE, POTASSIUM CHLORIDE, SODIUM LACTATE AND CALCIUM CHLORIDE: 600; 310; 30; 20 INJECTION, SOLUTION INTRAVENOUS at 23:58

## 2022-01-21 RX ADMIN — Medication 1 MILLI-UNITS/MIN: at 23:58

## 2022-01-21 ASSESSMENT — FIBROSIS 4 INDEX: FIB4 SCORE: 0.47

## 2022-01-21 ASSESSMENT — PATIENT HEALTH QUESTIONNAIRE - PHQ9
1. LITTLE INTEREST OR PLEASURE IN DOING THINGS: NOT AT ALL
SUM OF ALL RESPONSES TO PHQ9 QUESTIONS 1 AND 2: 0
2. FEELING DOWN, DEPRESSED, IRRITABLE, OR HOPELESS: NOT AT ALL

## 2022-01-21 ASSESSMENT — LIFESTYLE VARIABLES: EVER_SMOKED: YES

## 2022-01-21 NOTE — H&P
DATE OF ADMISSION:  2022     CHIEF COMPLAINT:  Scheduled induction of labor due to severe fetal growth   restriction.     HISTORY OF PRESENT ILLNESS:  The patient has prenatal care with myself.  Her   pregnancy has been complicated by a past medical history significant for   B-cell lymphoma, stage IV.  She has been in remission since .  She was   previously seeing Dr. Theodore (pediatric oncology).  The patient had low-risk   NIPT testing and declined carrier screening.  She and her partner are both   .  She was vitamin D deficient and has been supplementing with   vitamin D3; however, her level is still 28.  She is rubella nonimmune.  She   had an anatomy ultrasound performed at 20 weeks, which showed left renal   pelvis dilation of 9.9 mm that was persistent at 32 weeks and she was referred   [facility].  The fetus was subsequently diagnosed with   mild hypoplasia of the ascending aorta.  The pediatric cardiologist performed   an echo and it is recommended that the infant have a  echo with   pediatric cardiology as well as a post-doris follow up with urology.  The   patient was also diagnosed with fetal growth restriction and has been   undergoing serial ultrasounds as well as  monitoring.  Dr. Bishop   spoke with me this afternoon and reported that the fetus now meets criteria   for severe fetal growth restriction.  The report is pending, but she   recommended delivery in the next 1-2 days.  The patient had    monitoring at my office today that was reactive and reassuring.  The patient   endorses positive fetal movement.  She denies vaginal bleeding or loss of   fluid.  She denies contractions.  She was 1 cm dilated in our office recently.     REVIEW OF SYSTEMS:  Denies fevers, chills, shortness of breath, chest pain,   nausea, vomiting, diarrhea or dysuria.     PAST MEDICAL HISTORY:  Stage IV B-cell lymphoma, in remission since 2017.       PAST  SURGICAL HISTORY: Port placement and removal.     ALLERGIES:  No known drug allergies.     MEDICATIONS:  Prenatal vitamins and vitamin D3 4000 international units once   daily.     GYNECOLOGIC HISTORY:  Pap smear not collected at prenatal visit due to being   under 21 years old, will need a Pap smear postpartum.  Denies history of   sexually transmitted infections or genital herpes.     OBSTETRICAL HISTORY:  G1, spontaneous miscarriage.  G2, current prenatal care   with myself.     SOCIAL HISTORY:  Denies tobacco use, alcohol use, or drug use.  Father of the   baby's name is Angel Martinez.     PHYSICAL EXAMINATION:  VITAL SIGNS: Pending.   GENERAL:  Alert, conversational, pleasant, no acute distress.  HEENT:  Moist mucous membranes.  CARDIOVASCULAR:  Regular rate.  PULMONARY:  No respiratory distress.  CHEST:  Symmetric expansion.  ABDOMEN:  Soft, nontender, nondistended, gravid.  GENITOURINARY:  1 cm, 50% effaced, -2 fetal station per our nurse   practitioner.  EXTREMITIES:  Moves all, no edema.     LABORATORY STUDIES:  Prenatal labs reviewed.  The patient is A positive,   antibody negative, RPR nonreactive, hepatitis B surface antigen nonreactive,   rubella nonimmune, hepatitis C antibody nonreactive, HIV nonreactive.    Gonorrhea and chlamydia negative.  Urine culture less than 10,000   gram-positive organisms.  Varicella immune.  Vitamin D23 now 28.  One-hour   glucose tolerance test 58.  GBS positive.     IMAGING:  Anatomy ultrasound report shows a single live intrauterine   pregnancy, male infant.  Growth ultrasound today showed severe fetal growth   restriction per Dr. Bishop, vertex presentation.  The fetus has mild hypoplasia   of the ascending aorta as well as right renal pelvis dilation.  AFW pending   per the report from Dr. Bishop's office; however, the patient's growth   ultrasound in our office last week showed an estimated fetal weight of 5   pounds 8 ounces with normal umbilical artery Dopplers and  normal fluid today.     ASSESSMENT:  A 21-year-old  2, para 0-0-1-0 at 37 weeks and 3 days by   last menstrual period, EDC 2022.  1.  Term intrauterine pregnancy at 37 weeks and 3 days.  2.  Severe fetal growth restriction.  3.  Mild hypoplasia of the ascending aorta, post- echo recommended.  4.  Left renal pelvis dilation.  5.  Rubella nonimmune.  6.  Vitamin D deficient.  7.  History of B-cell lymphoma (remission since 2017).  8.  GBS positive.     PLAN:  1.  Admit to L and D.  2.  CBC, type and screen.  3.  Continuous fetal heart tracing/tocometer.  4.  Cook's balloon versus Cytotec induction of labor per Dr. Khanna's   preference.  5.  Clear liquid diet.  6.  IV fentanyl versus epidural as needed for pain.  7.  Penicillin G for GBS prophylaxis, to be started per Dr. Khanna's   discretion.        ______________________________  MD DAVE Hummel/FERNANDO/REE    DD:  2022 16:08  DT:  2022 16:55    Job#:  [id number]

## 2022-01-22 ENCOUNTER — ANESTHESIA (OUTPATIENT)
Dept: ANESTHESIOLOGY | Facility: MEDICAL CENTER | Age: 22
End: 2022-01-22
Payer: COMMERCIAL

## 2022-01-22 ENCOUNTER — ANESTHESIA EVENT (OUTPATIENT)
Dept: ANESTHESIOLOGY | Facility: MEDICAL CENTER | Age: 22
End: 2022-01-22
Payer: COMMERCIAL

## 2022-01-22 LAB
ERYTHROCYTE [DISTWIDTH] IN BLOOD BY AUTOMATED COUNT: 45.1 FL (ref 35.9–50)
HCT VFR BLD AUTO: 35 % (ref 37–47)
HGB BLD-MCNC: 11.9 G/DL (ref 12–16)
MCH RBC QN AUTO: 28.9 PG (ref 27–33)
MCHC RBC AUTO-ENTMCNC: 34 G/DL (ref 33.6–35)
MCV RBC AUTO: 85 FL (ref 81.4–97.8)
PLATELET # BLD AUTO: 208 K/UL (ref 164–446)
PMV BLD AUTO: 11.3 FL (ref 9–12.9)
RBC # BLD AUTO: 4.12 M/UL (ref 4.2–5.4)
WBC # BLD AUTO: 11.3 K/UL (ref 4.8–10.8)

## 2022-01-22 PROCEDURE — 770002 HCHG ROOM/CARE - OB PRIVATE (112)

## 2022-01-22 PROCEDURE — 36415 COLL VENOUS BLD VENIPUNCTURE: CPT

## 2022-01-22 PROCEDURE — 303615 HCHG EPIDURAL/SPINAL ANESTHESIA FOR LABOR

## 2022-01-22 PROCEDURE — 700101 HCHG RX REV CODE 250: Performed by: ANESTHESIOLOGY

## 2022-01-22 PROCEDURE — 700111 HCHG RX REV CODE 636 W/ 250 OVERRIDE (IP): Performed by: OBSTETRICS & GYNECOLOGY

## 2022-01-22 PROCEDURE — 304965 HCHG RECOVERY SERVICES

## 2022-01-22 PROCEDURE — 0UQMXZZ REPAIR VULVA, EXTERNAL APPROACH: ICD-10-PCS | Performed by: OBSTETRICS & GYNECOLOGY

## 2022-01-22 PROCEDURE — 700102 HCHG RX REV CODE 250 W/ 637 OVERRIDE(OP): Performed by: OBSTETRICS & GYNECOLOGY

## 2022-01-22 PROCEDURE — 700101 HCHG RX REV CODE 250

## 2022-01-22 PROCEDURE — 700111 HCHG RX REV CODE 636 W/ 250 OVERRIDE (IP): Performed by: ANESTHESIOLOGY

## 2022-01-22 PROCEDURE — 700105 HCHG RX REV CODE 258: Performed by: OBSTETRICS & GYNECOLOGY

## 2022-01-22 PROCEDURE — 700105 HCHG RX REV CODE 258

## 2022-01-22 PROCEDURE — A9270 NON-COVERED ITEM OR SERVICE: HCPCS | Performed by: OBSTETRICS & GYNECOLOGY

## 2022-01-22 PROCEDURE — 3E033VJ INTRODUCTION OF OTHER HORMONE INTO PERIPHERAL VEIN, PERCUTANEOUS APPROACH: ICD-10-PCS | Performed by: OBSTETRICS & GYNECOLOGY

## 2022-01-22 PROCEDURE — 85027 COMPLETE CBC AUTOMATED: CPT

## 2022-01-22 PROCEDURE — 59409 OBSTETRICAL CARE: CPT

## 2022-01-22 RX ORDER — DOCUSATE SODIUM 100 MG/1
100 CAPSULE, LIQUID FILLED ORAL 2 TIMES DAILY PRN
Status: DISCONTINUED | OUTPATIENT
Start: 2022-01-22 | End: 2022-01-24 | Stop reason: HOSPADM

## 2022-01-22 RX ORDER — METHYLERGONOVINE MALEATE 0.2 MG/ML
0.2 INJECTION INTRAVENOUS
Status: DISCONTINUED | OUTPATIENT
Start: 2022-01-22 | End: 2022-01-22 | Stop reason: HOSPADM

## 2022-01-22 RX ORDER — ACETAMINOPHEN 500 MG
1000 TABLET ORAL EVERY 6 HOURS PRN
Status: DISCONTINUED | OUTPATIENT
Start: 2022-01-22 | End: 2022-01-24 | Stop reason: HOSPADM

## 2022-01-22 RX ORDER — SODIUM CHLORIDE, SODIUM LACTATE, POTASSIUM CHLORIDE, CALCIUM CHLORIDE 600; 310; 30; 20 MG/100ML; MG/100ML; MG/100ML; MG/100ML
INJECTION, SOLUTION INTRAVENOUS PRN
Status: DISCONTINUED | OUTPATIENT
Start: 2022-01-22 | End: 2022-01-24 | Stop reason: HOSPADM

## 2022-01-22 RX ORDER — LIDOCAINE HYDROCHLORIDE 10 MG/ML
INJECTION, SOLUTION INFILTRATION; PERINEURAL
Status: COMPLETED
Start: 2022-01-22 | End: 2022-01-22

## 2022-01-22 RX ORDER — OXYTOCIN 10 [USP'U]/ML
10 INJECTION, SOLUTION INTRAMUSCULAR; INTRAVENOUS
Status: DISCONTINUED | OUTPATIENT
Start: 2022-01-22 | End: 2022-01-22 | Stop reason: HOSPADM

## 2022-01-22 RX ORDER — SODIUM CHLORIDE, SODIUM LACTATE, POTASSIUM CHLORIDE, AND CALCIUM CHLORIDE .6; .31; .03; .02 G/100ML; G/100ML; G/100ML; G/100ML
1000 INJECTION, SOLUTION INTRAVENOUS
Status: DISCONTINUED | OUTPATIENT
Start: 2022-01-22 | End: 2022-01-22 | Stop reason: HOSPADM

## 2022-01-22 RX ORDER — TERBUTALINE SULFATE 1 MG/ML
0.25 INJECTION, SOLUTION SUBCUTANEOUS
Status: DISCONTINUED | OUTPATIENT
Start: 2022-01-22 | End: 2022-01-22 | Stop reason: HOSPADM

## 2022-01-22 RX ORDER — OXYCODONE HYDROCHLORIDE 5 MG/1
5 TABLET ORAL EVERY 4 HOURS PRN
Status: DISCONTINUED | OUTPATIENT
Start: 2022-01-22 | End: 2022-01-24 | Stop reason: HOSPADM

## 2022-01-22 RX ORDER — MISOPROSTOL 200 UG/1
800 TABLET ORAL
Status: DISCONTINUED | OUTPATIENT
Start: 2022-01-22 | End: 2022-01-22 | Stop reason: HOSPADM

## 2022-01-22 RX ORDER — ROPIVACAINE HYDROCHLORIDE 2 MG/ML
INJECTION, SOLUTION EPIDURAL; INFILTRATION; PERINEURAL CONTINUOUS
Status: DISCONTINUED | OUTPATIENT
Start: 2022-01-22 | End: 2022-01-22

## 2022-01-22 RX ORDER — ONDANSETRON 2 MG/ML
4 INJECTION INTRAMUSCULAR; INTRAVENOUS EVERY 6 HOURS PRN
Status: DISCONTINUED | OUTPATIENT
Start: 2022-01-22 | End: 2022-01-22 | Stop reason: HOSPADM

## 2022-01-22 RX ORDER — CALCIUM CARBONATE 500 MG/1
1000 TABLET, CHEWABLE ORAL DAILY
Status: DISCONTINUED | OUTPATIENT
Start: 2022-01-22 | End: 2022-01-22

## 2022-01-22 RX ORDER — CALCIUM CARBONATE 500 MG/1
1000 TABLET, CHEWABLE ORAL ONCE
Status: COMPLETED | OUTPATIENT
Start: 2022-01-22 | End: 2022-01-22

## 2022-01-22 RX ORDER — VITAMIN A ACETATE, BETA CAROTENE, ASCORBIC ACID, CHOLECALCIFEROL, .ALPHA.-TOCOPHEROL ACETATE, DL-, THIAMINE MONONITRATE, RIBOFLAVIN, NIACINAMIDE, PYRIDOXINE HYDROCHLORIDE, FOLIC ACID, CYANOCOBALAMIN, CALCIUM CARBONATE, FERROUS FUMARATE, ZINC OXIDE, CUPRIC OXIDE 3080; 12; 120; 400; 1; 1.84; 3; 20; 22; 920; 25; 200; 27; 10; 2 [IU]/1; UG/1; MG/1; [IU]/1; MG/1; MG/1; MG/1; MG/1; MG/1; [IU]/1; MG/1; MG/1; MG/1; MG/1; MG/1
1 TABLET, FILM COATED ORAL
Status: DISCONTINUED | OUTPATIENT
Start: 2022-01-22 | End: 2022-01-24 | Stop reason: HOSPADM

## 2022-01-22 RX ORDER — IBUPROFEN 800 MG/1
800 TABLET ORAL 3 TIMES DAILY PRN
Status: DISCONTINUED | OUTPATIENT
Start: 2022-01-22 | End: 2022-01-24 | Stop reason: HOSPADM

## 2022-01-22 RX ORDER — SODIUM CHLORIDE, SODIUM LACTATE, POTASSIUM CHLORIDE, AND CALCIUM CHLORIDE .6; .31; .03; .02 G/100ML; G/100ML; G/100ML; G/100ML
250 INJECTION, SOLUTION INTRAVENOUS PRN
Status: DISCONTINUED | OUTPATIENT
Start: 2022-01-22 | End: 2022-01-22 | Stop reason: HOSPADM

## 2022-01-22 RX ORDER — LIDOCAINE HYDROCHLORIDE AND EPINEPHRINE 15; 5 MG/ML; UG/ML
INJECTION, SOLUTION EPIDURAL
Status: COMPLETED | OUTPATIENT
Start: 2022-01-22 | End: 2022-01-22

## 2022-01-22 RX ORDER — ONDANSETRON 4 MG/1
4 TABLET, ORALLY DISINTEGRATING ORAL EVERY 6 HOURS PRN
Status: DISCONTINUED | OUTPATIENT
Start: 2022-01-22 | End: 2022-01-22 | Stop reason: HOSPADM

## 2022-01-22 RX ORDER — BISACODYL 10 MG
10 SUPPOSITORY, RECTAL RECTAL PRN
Status: DISCONTINUED | OUTPATIENT
Start: 2022-01-22 | End: 2022-01-24 | Stop reason: HOSPADM

## 2022-01-22 RX ORDER — MISOPROSTOL 200 UG/1
600 TABLET ORAL
Status: DISCONTINUED | OUTPATIENT
Start: 2022-01-22 | End: 2022-01-24 | Stop reason: HOSPADM

## 2022-01-22 RX ADMIN — LIDOCAINE HYDROCHLORIDE,EPINEPHRINE BITARTRATE 3 ML: 15; .005 INJECTION, SOLUTION EPIDURAL; INFILTRATION; INTRACAUDAL; PERINEURAL at 02:09

## 2022-01-22 RX ADMIN — Medication 125 ML/HR: at 06:15

## 2022-01-22 RX ADMIN — ROPIVACAINE HYDROCHLORIDE: 2 INJECTION, SOLUTION EPIDURAL; INFILTRATION at 03:13

## 2022-01-22 RX ADMIN — Medication 2000 ML/HR: at 05:00

## 2022-01-22 RX ADMIN — PRENATAL WITH FERROUS FUM AND FOLIC ACID 1 TABLET: 3080; 920; 120; 400; 22; 1.84; 3; 20; 10; 1; 12; 200; 27; 25; 2 TABLET ORAL at 08:19

## 2022-01-22 RX ADMIN — CALCIUM CARBONATE 1000 MG: 500 TABLET, CHEWABLE ORAL at 03:13

## 2022-01-22 RX ADMIN — BUPIVACAINE HYDROCHLORIDE 7 ML: 2.5 INJECTION, SOLUTION EPIDURAL; INFILTRATION; INTRACAUDAL; PERINEURAL at 02:09

## 2022-01-22 RX ADMIN — SODIUM CHLORIDE 2.5 MILLION UNITS: 9 INJECTION, SOLUTION INTRAVENOUS at 04:00

## 2022-01-22 RX ADMIN — LIDOCAINE HYDROCHLORIDE: 10 INJECTION, SOLUTION INFILTRATION; PERINEURAL at 05:00

## 2022-01-22 RX ADMIN — SODIUM CHLORIDE: 900 INJECTION INTRAVENOUS at 00:00

## 2022-01-22 ASSESSMENT — PAIN SCALES - GENERAL: PAIN_LEVEL: 0

## 2022-01-22 NOTE — ANESTHESIA POSTPROCEDURE EVALUATION
Patient: Ngoc Morales    Procedure Summary     Date: 01/22/22 Room / Location:     Anesthesia Start: 0202 Anesthesia Stop: 0503    Procedure: Labor Epidural Diagnosis:     Scheduled Providers:  Responsible Provider: Annmarie Anna M.D.    Anesthesia Type: epidural ASA Status: 2          Final Anesthesia Type: epidural  Last vitals  BP   Blood Pressure: 121/70    Temp   36.3 °C (97.3 °F)    Pulse   92   Resp   20    SpO2   97 %      Anesthesia Post Evaluation    Patient location during evaluation: bedside  Patient participation: complete - patient participated  Level of consciousness: awake and alert  Pain score: 0    Airway patency: patent  Anesthetic complications: no  Cardiovascular status: adequate  Respiratory status: acceptable  Hydration status: acceptable    PONV: none          No complications documented.     Nurse Pain Score: 8 (NPRS)

## 2022-01-22 NOTE — L&D DELIVERY NOTE
SPONTANEOUS VAGINAL DELIVERY PROCEDURE NOTE    DATE OF DELIVERY: 1/22/2022       PRIMARY OBSTETRICIAN: Jailyn Karimi M.D.    DELIVERING OBSTETRICIAN: Carlos Khanna MD    PROCEDURE: Normal spontaneous vaginal delivery    PREPROCEDURE DIAGNOSES:  1.  Intrauterine pregnancy at 37w4d   2.  Severe fetal growth restriction, <3%  3.  Induction of labor for the same per [facility] recommendations  4.  Maternal hx of B-cell lymphoma, Stage IV      POSTPROCEDURE DIAGNOSES:  1.  Intrauterine pregnancy at 37w4d   2.  Severe fetal growth restriction, <3%  3.  Induction of labor for the same per [facility] recommendations  4.  Maternal hx of B-cell lymphoma, Stage IV  5.  Postpartum status post normal spontaneous vaginal delivery.    ANESTHESIA: epidural    ANESTHESIOLOGIST: Annmarie Anna MD    FINDINGS:  1.  Viable male infant in cephalic position delivered at 5:03 AM  on 1/22/2022.   2.  Birth Weight: 2.615 kg (5 lb 12.2 oz) .  3.  APGARs 8 at 1 minute, 9 at 5 minutes.  4.  Intact, normal-appearing placenta, three-vessel cord, central insertion.  5.  1.5cm left periurethral laceration, NO perineal laceration.  6.  Clear amniotic fluid.    ESTIMATED BLOOD LOSS: 100mL    NARRATIVE:   This 21-year-old G1, P0 with intrauterine pregnancy at 37w4d gestational age presented to labor and delivery the evening prior for scheduled induction of labor.  The patient herself has a history of B-cell lymphoma stage IV in remission.  This pregnancy was complicated by a history of fetal growth restriction for which she has been following with maternal-fetal medicine (the [facility]).  The patient was found at her last ultrasound to have fetal growth restriction now less than the 3rd percentile qualifying for severe FGR, and thus the recommendation was to move forward with induction of labor in the 37th week.  The patient was found to have an unfavorable cervix and was started with cervical ripening with a cervical ripening balloon.   She wished to get moving with her induction and thus oxytocin was also started and she received an epidural for pain control.  She eventually achieved complete dilation.   She pushed for 45 minutes before delivery of the fetal head, which occurred atraumatically. It was guided out gently without traction.  The shoulders and the rest of the baby delivered atraumatically, immediately thereafter.  The infant was warmed and dried by the awaiting baby nurse.  The nose and mouth were suctioned with the bulb suction.  The infant was moving all extremities equally.   The cord was doubly clamped and cut and the placenta delivered quickly there after during active management of the third stage of labor with fundal massage, gentle cord traction and application of oxytocin at postpartum dosing.   A survey of the patient's perineum, vulva and vagina revealed a small 1.5 cm left periurethral laceration.  This was repaired with 2 interrupted stitches of 3-0 Vicryl Rapide.  At the end of the repair the edges were hemostatic and well reapproximated.  The patient tolerated the procedure well.  There were no complications.  Sponge and needle counts are correct at the end of the procedure.  The patient and her infant remained in her birthing room before later transfer to maternity.  Dr. Khanna was present throughout and performed the entire procedure.    COMPLICATIONS: none    CONDITION: good    DISPOSITION: Labor room then Maternity         Carlos Khanna MD, MS, FACOG,  1/22/2022

## 2022-01-22 NOTE — CARE PLAN
The patient is Stable - Low risk of patient condition declining or worsening         Progress made toward(s) clinical / shift goals:  Healthy labor and delivery.     Patient is not progressing towards the following goals: N/A.       Problem: Knowledge Deficit - L&D  Goal: Patient and family/caregivers will demonstrate understanding of plan of care, disease process/condition, diagnostic tests and medications  Outcome: Progressing   POC discussed with pt and FOB. Both pt and FOB verbalized understanding.   Problem: Psychosocial - L&D  Goal: Patient's level of anxiety will decrease  Outcome: Progressing   Encouraged pt to express feelings and concerns.   Problem: Risk for Infection and Impaired Wound Healing  Goal: Patient will remain free from infection  Outcome: Progressing   Hand hygiene implemented. Standard precautions in use.

## 2022-01-22 NOTE — ANESTHESIA PREPROCEDURE EVALUATION
Date: 01/22/22  Procedure: Labor Epidural         Relevant Problems   NEURO   (positive) History of B-cell lymphoma       Physical Exam    Airway   Mallampati: I  TM distance: >3 FB  Neck ROM: full       Cardiovascular - normal exam     Dental - normal exam           Pulmonary   Breath sounds clear to auscultation     Abdominal    Neurological - normal exam                 Anesthesia Plan    ASA 2       Plan - epidural   Neuraxial block will be labor analgesia                  Pertinent diagnostic labs and testing reviewed    Informed Consent:    Anesthetic plan and risks discussed with patient.

## 2022-01-22 NOTE — PROGRESS NOTES
220- Report received from BASHIR Jean.     2300- Cooks balloon inserted. 80/80. Pt tolerated well.     0130- Pt would like an epidural. LR bolus initiated.     0200- Dr. Anna at bedside for epidural procedure.     0205- Time out called for epidural. All agreeable to proceed.    0400- Pt feels pressure. SVE complete/+1. Pt in stirrups. Pt given instructions on pushing efforts. Pt verbalized understanding.     0452- Dr. Khanna called to bedside for delivery.     0503-  of viable male infant, per Dr. Khanna. APGARS 8/9. Skin to skin with mother.     0705- Report given to BASHIR Gomes. POC discussed. VSS. Light lochia rubra noted with firm fundus one fingerbreadth below umbilicus.

## 2022-01-22 NOTE — ANESTHESIA PROCEDURE NOTES
Epidural Block    Date/Time: 1/22/2022 2:09 AM  Performed by: Annmarie Anna M.D.  Authorized by: Annmarie Anna M.D.     Patient Location:  OB  Start Time:  1/22/2022 2:09 AM  Reason for Block: labor analgesia    patient identified, IV checked, site marked, risks and benefits discussed, surgical consent, monitors and equipment checked, pre-op evaluation and timeout performed    Patient Position:  Sitting  Prep: ChloraPrep, patient draped and sterile technique    Monitoring:  Blood pressure, continuous pulse oximetry and heart rate  Approach:  Midline  Location:  L3-L4  Injection Technique:  VIKRAM saline  Skin infiltration:  Lidocaine  Strength:  1%  Dose:  3ml  Needle Type:  Tuohy  Needle Gauge:  17 G  Needle Length:  3.5 in  Loss of resistance::  4  Catheter Size:  19 G  Catheter at Skin Depth:  9  Test Dose Result:  Negative

## 2022-01-22 NOTE — PROGRESS NOTES
0700 report received from Zoya CAMEJO.  0735 Pt transferred to Postpartum with infant in arms. Report given to Xi CAMEJO.

## 2022-01-22 NOTE — PROGRESS NOTES
Pt presents to L&D for scheduled IOL. Pt ambulated to S220 for admission.     2104 TOCO and EFM applied, VSS. Pt reports +FM, denies LOF or VB. POC discussed and all questions answered.     2200 Report given to Zoya CAMEJO, POC discussed.

## 2022-01-22 NOTE — ANESTHESIA TIME REPORT
Anesthesia Start and Stop Event Times     Date Time Event    1/22/2022 0202 Ready for Procedure     0202 Anesthesia Start     0503 Anesthesia Stop        Responsible Staff  01/22/22    Name Role Begin End    Annmarie Anna M.D. Anesth 0202 0503        Preop Diagnosis (Free Text):  Pre-op Diagnosis             Preop Diagnosis (Codes):    Premium Reason  A. 3PM - 7AM    Comments:

## 2022-01-23 PROCEDURE — 700102 HCHG RX REV CODE 250 W/ 637 OVERRIDE(OP): Performed by: OBSTETRICS & GYNECOLOGY

## 2022-01-23 PROCEDURE — 700111 HCHG RX REV CODE 636 W/ 250 OVERRIDE (IP): Performed by: OBSTETRICS & GYNECOLOGY

## 2022-01-23 PROCEDURE — 90686 IIV4 VACC NO PRSV 0.5 ML IM: CPT | Performed by: OBSTETRICS & GYNECOLOGY

## 2022-01-23 PROCEDURE — 3E02340 INTRODUCTION OF INFLUENZA VACCINE INTO MUSCLE, PERCUTANEOUS APPROACH: ICD-10-PCS | Performed by: OBSTETRICS & GYNECOLOGY

## 2022-01-23 PROCEDURE — 3E0234Z INTRODUCTION OF SERUM, TOXOID AND VACCINE INTO MUSCLE, PERCUTANEOUS APPROACH: ICD-10-PCS | Performed by: OBSTETRICS & GYNECOLOGY

## 2022-01-23 PROCEDURE — 90707 MMR VACCINE SC: CPT | Performed by: OBSTETRICS & GYNECOLOGY

## 2022-01-23 PROCEDURE — 770002 HCHG ROOM/CARE - OB PRIVATE (112)

## 2022-01-23 PROCEDURE — A9270 NON-COVERED ITEM OR SERVICE: HCPCS | Performed by: OBSTETRICS & GYNECOLOGY

## 2022-01-23 PROCEDURE — 90471 IMMUNIZATION ADMIN: CPT

## 2022-01-23 RX ORDER — IBUPROFEN 800 MG/1
800 TABLET ORAL 3 TIMES DAILY PRN
Qty: 30 TABLET | Refills: 1 | Status: SHIPPED | OUTPATIENT
Start: 2022-01-23 | End: 2023-12-19

## 2022-01-23 RX ADMIN — ACETAMINOPHEN 1000 MG: 500 TABLET ORAL at 02:10

## 2022-01-23 RX ADMIN — IBUPROFEN 800 MG: 800 TABLET, FILM COATED ORAL at 23:27

## 2022-01-23 RX ADMIN — INFLUENZA A VIRUS A/VICTORIA/2570/2019 IVR-215 (H1N1) ANTIGEN (FORMALDEHYDE INACTIVATED), INFLUENZA A VIRUS A/TASMANIA/503/2020 IVR-221 (H3N2) ANTIGEN (FORMALDEHYDE INACTIVATED), INFLUENZA B VIRUS B/PHUKET/3073/2013 ANTIGEN (FORMALDEHYDE INACTIVATED), AND INFLUENZA B VIRUS B/WASHINGTON/02/2019 ANTIGEN (FORMALDEHYDE INACTIVATED) 0.5 ML: 15; 15; 15; 15 INJECTION, SUSPENSION INTRAMUSCULAR at 11:39

## 2022-01-23 RX ADMIN — PRENATAL WITH FERROUS FUM AND FOLIC ACID 1 TABLET: 3080; 920; 120; 400; 22; 1.84; 3; 20; 10; 1; 12; 200; 27; 25; 2 TABLET ORAL at 09:27

## 2022-01-23 RX ADMIN — MEASLES, MUMPS, AND RUBELLA VIRUS VACCINE LIVE 0.5 ML: 1000; 12500; 1000 INJECTION, POWDER, LYOPHILIZED, FOR SUSPENSION SUBCUTANEOUS at 11:35

## 2022-01-23 RX ADMIN — IBUPROFEN 800 MG: 800 TABLET, FILM COATED ORAL at 11:53

## 2022-01-23 RX ADMIN — ACETAMINOPHEN 1000 MG: 500 TABLET ORAL at 16:11

## 2022-01-23 ASSESSMENT — PAIN DESCRIPTION - PAIN TYPE
TYPE: ACUTE PAIN

## 2022-01-23 ASSESSMENT — EDINBURGH POSTNATAL DEPRESSION SCALE (EPDS)
I HAVE BEEN ANXIOUS OR WORRIED FOR NO GOOD REASON: NO, NOT AT ALL
THINGS HAVE BEEN GETTING ON TOP OF ME: NO, MOST OF THE TIME I HAVE COPED QUITE WELL
I HAVE FELT SCARED OR PANICKY FOR NO GOOD REASON: NO, NOT AT ALL
I HAVE FELT SAD OR MISERABLE: NO, NOT AT ALL
I HAVE BEEN SO UNHAPPY THAT I HAVE BEEN CRYING: ONLY OCCASIONALLY
I HAVE BLAMED MYSELF UNNECESSARILY WHEN THINGS WENT WRONG: NOT VERY OFTEN
THE THOUGHT OF HARMING MYSELF HAS OCCURRED TO ME: NEVER
I HAVE BEEN SO UNHAPPY THAT I HAVE HAD DIFFICULTY SLEEPING: NOT AT ALL
I HAVE BEEN ABLE TO LAUGH AND SEE THE FUNNY SIDE OF THINGS: AS MUCH AS I ALWAYS COULD
I HAVE LOOKED FORWARD WITH ENJOYMENT TO THINGS: AS MUCH AS I EVER DID

## 2022-01-23 NOTE — DISCHARGE SUMMARY
DATE OF ADMISSION:  01/21/2022   DATE OF DISCHARGE:  01/23/2022     ADMITTING DIAGNOSES:  1.  Intrauterine pregnancy at 37 and 3/7th weeks' gestational age.  2.  Severe intrauterine growth restriction.  3.  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  4.  History of B-cell lymphoma.  5.  Group B strep positive.     DISCHARGE DIAGNOSES:  1.  Intrauterine pregnancy at 37 and 3/7th weeks' gestational age.  2.  Severe intrauterine growth restriction.  3.  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  4.  History of B-cell lymphoma.  5.  Group B strep positive.  6.  Status post normal spontaneous vaginal delivery.     HOSPITAL COURSE:  The patient was admitted and underwent vaginal delivery   yesterday morning.  She is stable and desires to be discharged home.  Her   postpartum hemoglobin is stable at 11.9.  She has a prescription written for   Motrin and she has been instructed to follow up with Dr. Karimi in 6 weeks'   time.        ______________________________  Corinne E. Capurro, MD    CEC/JAN    DD:  01/23/2022 05:46  DT:  01/23/2022 07:13    Job#:  [id number]

## 2022-01-23 NOTE — PROGRESS NOTES
Patient admitted to the floor. VSS. Bands and cuddles checked. Bleeding light and fundus firm. Patient oriented to the room. Will monitor.

## 2022-01-23 NOTE — CARE PLAN
Problem: Altered Physiologic Condition  Goal: Patient physiologically stable as evidenced by normal lochia, palpable uterine involution and vitals within normal limits  Outcome: Progressing     Problem: Pain - Standard  Goal: Alleviation of pain or a reduction in pain to the patient’s comfort goal  Outcome: Progressing     The patient is Stable - Low risk of patient condition declining or worsening    Shift Goals  Clinical Goals: pain control/ maintain firm fundus with light lochia    Progress made toward(s) clinical / shift goals:  Patient requests to call for pain medication when needed. Patient aware of available prn medication and available nonpharmacologic pain intervention. Patient able to care for self and infant at current pain level.  Fundus firm. Lochia light. Patient educated to call if saturating a pad in more than hour or for large clots.      Patient is not progressing towards the following goals:

## 2022-01-23 NOTE — LACTATION NOTE
This note was copied from a baby's chart.  Mother latching baby and reports that feeding at breast is going well. Latch improved overnight. Evaluated mothers personal use breast pump at her request to check flange fit. Encouraged parents to wake baby Bertin every few hours if he is sleepy, strip him down to a clean diaper and place him skin to skin. They have a couple of mls of colostrum on ice, syringes provided so they can feed it to him when they want.

## 2022-01-23 NOTE — CARE PLAN
The patient is Stable - Low risk of patient condition declining or worsening    Shift Goals  Clinical Goals: Clinically stable    Progress made toward(s) clinical / shift goals:  Patient is clinically stable    Patient is not progressing towards the following goals:

## 2022-01-23 NOTE — PROGRESS NOTES
Called Dr. Khanna in regards to patient discharging. Notified Dr. Khanna that patient is a day 1 vaginal delivery and was GBS positive. Infant has to stay one more night due to the need of 48 hour monitoring. Dr. Khanna canceled the patient's discharge order.

## 2022-01-23 NOTE — DISCHARGE PLANNING
Discharge Planning Assessment Post Partum    Reason for Referral:  Consult-history of THC prior to pregnancy  Address: [address] [place] [address] [id number] [place] [address]  Phone: [phone]  Type of Living Situation: living with FOB  Mom Diagnosis: Pregnancy  Baby Diagnosis: -37.4 weeks  Primary Language: English    Name of Baby: Bertin [place]wood (: 22)  Father of the Baby: Angel [place]  Involved in baby’s care? Yes  Contact Information: [phone]    Prenatal Care: Yes  Mom's PCP: Dr. Jie Germain  PCP for new baby: Pediatrician list provided    Support System: FOB and family  Coping/Bonding between mother & baby: Yes  Source of Feeding: breast feeding  Supplies for Infant: prepared for infant; denies any needs    Mom's Insurance: [employer]  Baby Covered on Insurance:Yes  Mother Employed/School: Not currently  Other children in the home/names & ages: No, first baby    Financial Hardship/Income: No   Mom's Mental status: alert and oriented  Services used prior to admit: Medicaid and WIC    CPS History: No  Psychiatric History: No  Domestic Violence History: No  Drug/ETOH History: history of THC prior to pregnancy.  Infant's UDS is negative    Resources Provided: pediatrician list, children and family resource list, and post partum support and counseling resources  Referrals Made: diaper bank referral provided     Clearance for Discharge: Infant is cleared to discharge home with parents

## 2022-01-24 VITALS
RESPIRATION RATE: 19 BRPM | OXYGEN SATURATION: 96 % | WEIGHT: 145 LBS | BODY MASS INDEX: 25.69 KG/M2 | SYSTOLIC BLOOD PRESSURE: 115 MMHG | DIASTOLIC BLOOD PRESSURE: 77 MMHG | TEMPERATURE: 97.9 F | HEART RATE: 62 BPM | HEIGHT: 63 IN

## 2022-01-24 PROCEDURE — A9270 NON-COVERED ITEM OR SERVICE: HCPCS | Performed by: OBSTETRICS & GYNECOLOGY

## 2022-01-24 PROCEDURE — 700102 HCHG RX REV CODE 250 W/ 637 OVERRIDE(OP): Performed by: OBSTETRICS & GYNECOLOGY

## 2022-01-24 RX ADMIN — IBUPROFEN 800 MG: 800 TABLET, FILM COATED ORAL at 10:05

## 2022-01-24 RX ADMIN — PRENATAL WITH FERROUS FUM AND FOLIC ACID 1 TABLET: 3080; 920; 120; 400; 22; 1.84; 3; 20; 10; 1; 12; 200; 27; 25; 2 TABLET ORAL at 10:15

## 2022-01-24 ASSESSMENT — PAIN DESCRIPTION - PAIN TYPE
TYPE: ACUTE PAIN

## 2022-01-24 NOTE — PROGRESS NOTES
IOL for FGR <3%  Cat I FHT  Cx: 1-2/60/-3  Cervical ripening balloon placed 80/80mL  May start oxytocin  Cralos Khanna M.D. 1/22/2022    No

## 2022-01-24 NOTE — DISCHARGE SUMMARY
DATE OF ADMISSION:  01/21/2022   DATE OF DISCHARGE:  01/24/2022     ADMITTING DIAGNOSES:  1.  Intrauterine pregnancy at 37 and 3/7th weeks' gestational age.  2.  Severe intrauterine growth restriction.  3.  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  4.  History of B-cell lymphoma.  5.  Group B strep positive.     DISCHARGE DIAGNOSES:  1.  Intrauterine pregnancy at 37 and 3/7th weeks' gestational age.  2.  Severe intrauterine growth restriction.  3.  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  4.  History of B-cell lymphoma.  5.  Group B strep positive.  6.  Status post normal spontaneous vaginal delivery.     HOSPITAL COURSE:  The patient was admitted and underwent vaginal delivery   2d ago.  She stayed yesterday because of GBS positive status, despite receiving adequate antibiotic prophylaxis.  She is stable and desires to be discharged home.  Her   postpartum hemoglobin is stable at 11.9.  She has a prescription written for   Motrin and she has been instructed to follow up with Dr. Karimi in 6 weeks'   time.    Carlos Khanna MD, MS, FACOG, 1/24/2022, 7:54 AM

## 2022-01-24 NOTE — DISCHARGE INSTRUCTIONS
PATIENT DISCHARGE EDUCATION INSTRUCTION SHEET  REASONS TO CALL YOUR OBSTETRICIAN  · Persistent fever, shaking, chills (Temperature higher than 100.4) may indicate you have an infection  · Heavy bleeding: soaking more than 1 pad per hour; Passing clots an egg-sized clot or bigger may mean you have an postpartum hemorrhage  · Foul odor from vagina or bad smelling or discolored discharge or blood  · Breast infection (Mastitis symptoms); breast pain, chills, fever, redness or red streaks, may feel flu like symptoms  · Urinary pain, burning or frequency  · Incision that is not healing, increased redness, swelling, tenderness or pain, or any pus from episiotomy or  site may mean you have an infection  · Redness, swelling, warmth, or painful to touch in the calf area of your leg may mean you have a blood clot  · Severe or intensified depression, thoughts or feelings of wanting to hurt yourself or someone else   · Pain in chest, obstructed breathing or shortness of breath (trouble catching your breath) may mean you are having a postpartum complication. Call your provider immediately   · Headache that does not get better, even after taking medicine, a bad headache with vision changes or pain in the upper right area of your belly may mean you have high blood pressure or post birth preeclampsia. Call your provider immediately    HAND WASHING  All family and friends should wash their hands:  · Before and after holding the baby  · Before feeding the baby  · After using the restroom or changing the baby's diaper    VAGINAL CARE AND BLEEDING  · Nothing inside vagina for 6 weeks:   · No sexual intercourse, tampons or douching  · Bleeding may continue for 2-4 weeks. Amount and color may vary  · Soaking 1 pad or more in an hour for several hours is considered heavy bleeding  · Passing large egg sized blood clots can be concerning  · If you feel like you have heavy bleeding or are having increasing amount of blood clots call  "your Obstetrician immediately  · If you begin feeling faint upon standing, feeling sick to your stomach, have clammy skin, a really fast heartbeat, have chills, start feeling confused, dizzy, sleepy or weak, or feeling like you're going to faint call your Obstetrician immediately    HYPERTENSION   Preeclampsia or gestational hypertension are types of high blood pressure that only pregnant women can get. It is important for you to be aware of symptoms to seek early intervention and treatment. If you have any of these symptoms immediately call your Obstetrician    · Vision changes or blurred vision   · Severe headache or pain that is unrelieved with medication and will not go away  · Persistent pain in upper abdomen or shoulder   · Increased swelling of face, feet, or hands  · Difficulty breathing or shortness of breath at rest  · Urinating less than usual    URINATION AND BOWEL MOVEMENTS  · Eating more fiber (bran cereal, fruits, and vegetables) and drinking plenty of fluids will help to avoid constipation  · Urinary frequency and urgency after childbirth is normal  · If you experience any urinary pain, burning or frequency call your provider    BIRTH CONTROL  · It is possible to become pregnant at any time after delivery and while breastfeeding  · Plan to discuss a method of birth control with your physician at your post delivery follow up visit    POSTPARTUM BLUES  During the first few days after birth, you may experience a sense of the \"blues\" which may include impatience, irritability or even crying. These feelings come and go quickly. However, as many as 1 in 10 women experience emotional symptoms known as postpartum depression.     POSTPARTUM DEPRESSION    May start as early as the second or third day after delivery or take several weeks or months to develop. Symptoms of \"blues\" are present, but are more intense: Crying spells; loss of appetite; feelings of hopelessness or loss of control; fear of touching the " "baby; over concern or no concern at all about the baby; little or no concern about your own appearance/caring for yourself; and/or inability to sleep or excessive sleeping. Contact your Obstetrician if you are experiencing any of these symptoms     PREVENTING SHAKEN BABY  If you are angry or stressed, PUT THE BABY IN THE CRIB, step away, take some deep breaths, and wait until you are calm to care for the baby. DO NOT SHAKE THE BABY. You are not alone, call a supporter for help.  · [facility] 24/7 crisis call line (974-786-3740) or (1-434.853.6539)  · You can also text them, text \"ANSWER\" (683970)      "

## 2022-01-24 NOTE — CARE PLAN
Problem: Altered Physiologic Condition  Goal: Patient physiologically stable as evidenced by normal lochia, palpable uterine involution and vitals within normal limits  Outcome: Progressing     Problem: Pain - Standard  Goal: Alleviation of pain or a reduction in pain to the patient’s comfort goal  Outcome: Progressing     The patient is Stable - Low risk of patient condition declining or worsening    Shift Goals  Clinical Goals: pain control/ maintain firm fundus with light lochia     Progress made toward(s) clinical / shift goals:  Patient requests to call for pain medication when needed. Patient aware of available prn medication and available nonpharmacologic pain intervention. Patient able to care for self and infant at current pain level.  Fundus firm. Lochia scant. Patient educated to call if saturating a pad in more than hour or for large clots.      Patient is not progressing towards the following goals:

## 2022-01-24 NOTE — LACTATION NOTE
This note was copied from a baby's chart.  Observed breast feeding this morning, looks really optimal. Mother denies any nipple damage and is experiencing only mild tenderness with initial latch. We discussed importance of having baby Bertin's mouth wide open prior to lathing.

## 2022-08-09 ENCOUNTER — TELEPHONE (OUTPATIENT)
Dept: PEDIATRIC HEMATOLOGY/ONCOLOGY | Facility: OUTPATIENT CENTER | Age: 22
End: 2022-08-09
Payer: COMMERCIAL

## 2022-08-09 DIAGNOSIS — Z85.72 HISTORY OF B-CELL LYMPHOMA: ICD-10-CM

## 2022-08-09 NOTE — TELEPHONE ENCOUNTER
She had made an appt through [facility] for labs to get drawn in the office and I let her know that we don't do that anymore, I asked if she wanted to reschedule but she just asked for you to order them and she'll call to schedule after.

## 2022-10-12 ENCOUNTER — HOSPITAL ENCOUNTER (OUTPATIENT)
Dept: CARDIOLOGY | Facility: MEDICAL CENTER | Age: 22
End: 2022-10-12
Attending: PEDIATRICS
Payer: COMMERCIAL

## 2022-10-12 DIAGNOSIS — Z85.72 HISTORY OF B-CELL LYMPHOMA: ICD-10-CM

## 2022-10-12 LAB
LV EJECT FRACT MOD 2C 99903: 65.56
LV EJECT FRACT MOD 4C 99902: 50.38
LV EJECT FRACT MOD BP 99901: 50.78

## 2022-10-12 PROCEDURE — 93306 TTE W/DOPPLER COMPLETE: CPT

## 2022-10-12 PROCEDURE — 93306 TTE W/DOPPLER COMPLETE: CPT | Mod: 26 | Performed by: STUDENT IN AN ORGANIZED HEALTH CARE EDUCATION/TRAINING PROGRAM

## 2022-12-17 ENCOUNTER — HOSPITAL ENCOUNTER (EMERGENCY)
Facility: MEDICAL CENTER | Age: 22
End: 2022-12-17
Attending: EMERGENCY MEDICINE
Payer: COMMERCIAL

## 2022-12-17 VITALS
SYSTOLIC BLOOD PRESSURE: 104 MMHG | OXYGEN SATURATION: 94 % | BODY MASS INDEX: 23.04 KG/M2 | TEMPERATURE: 96.9 F | DIASTOLIC BLOOD PRESSURE: 62 MMHG | RESPIRATION RATE: 18 BRPM | WEIGHT: 130 LBS | HEART RATE: 110 BPM | HEIGHT: 63 IN

## 2022-12-17 DIAGNOSIS — R11.2 NAUSEA AND VOMITING, UNSPECIFIED VOMITING TYPE: ICD-10-CM

## 2022-12-17 DIAGNOSIS — F10.920 ALCOHOLIC INTOXICATION WITHOUT COMPLICATION (HCC): ICD-10-CM

## 2022-12-17 LAB — ETHANOL BLD-MCNC: 233.9 MG/DL

## 2022-12-17 PROCEDURE — 99285 EMERGENCY DEPT VISIT HI MDM: CPT

## 2022-12-17 PROCEDURE — 82077 ASSAY SPEC XCP UR&BREATH IA: CPT

## 2022-12-17 PROCEDURE — 700105 HCHG RX REV CODE 258: Performed by: EMERGENCY MEDICINE

## 2022-12-17 PROCEDURE — 36415 COLL VENOUS BLD VENIPUNCTURE: CPT

## 2022-12-17 RX ORDER — ONDANSETRON 4 MG/1
4 TABLET, ORALLY DISINTEGRATING ORAL EVERY 8 HOURS PRN
Qty: 10 TABLET | Refills: 0 | Status: SHIPPED | OUTPATIENT
Start: 2022-12-17 | End: 2023-12-19

## 2022-12-17 RX ORDER — SODIUM CHLORIDE, SODIUM LACTATE, POTASSIUM CHLORIDE, CALCIUM CHLORIDE 600; 310; 30; 20 MG/100ML; MG/100ML; MG/100ML; MG/100ML
1000 INJECTION, SOLUTION INTRAVENOUS ONCE
Status: COMPLETED | OUTPATIENT
Start: 2022-12-17 | End: 2022-12-17

## 2022-12-17 RX ADMIN — SODIUM CHLORIDE, POTASSIUM CHLORIDE, SODIUM LACTATE AND CALCIUM CHLORIDE 1000 ML: 600; 310; 30; 20 INJECTION, SOLUTION INTRAVENOUS at 05:37

## 2022-12-17 NOTE — ED TRIAGE NOTES
"Chief Complaint   Patient presents with    Alcohol Intoxication     Had about 6 shot of vodka tonight    N/V     Vomited 4 times and then dry heaving, zofran 4 mg IV given by EMS     BIB [facility] for above complaint. Denies trauma.     /65   Pulse (!) 105   Temp 35.9 °C (96.7 °F) (Temporal)   Ht 1.6 m (5' 3\")   Wt 59 kg (130 lb)   SpO2 99%   Breastfeeding Yes   BMI 23.03 kg/m²    "

## 2022-12-17 NOTE — ED NOTES
"Pt discharged home with s/o, pt A&Ox4,on room air, steady gait. Given breast pump. IV discontinued and gauze placed, pt in possession of belongings. Pt provided discharge education and information pertaining to medications and follow up appointments. Pt received copy of discharge instructions and verbalized understanding. /62   Pulse (!) 110   Temp 36.1 °C (96.9 °F) (Temporal)   Resp 18   Ht 1.6 m (5' 3\")   Wt 59 kg (130 lb)   SpO2 94%   Breastfeeding Yes   BMI 23.03 kg/m²     "

## 2022-12-17 NOTE — ED PROVIDER NOTES
ED Provider Note    CHIEF COMPLAINT  Chief Complaint   Patient presents with    Alcohol Intoxication     Had about 6 shot of vodka tonight    N/V     Vomited 4 times and then dry heaving, zofran 4 mg IV given by EMS       HPI  Ngoc Morales is a 21 y.o. female who presents for evaluation of alcohol intoxication and nausea with vomiting.  Patient apparently drinks 6 shots of vodka tonight which is unusual for her according to her boyfriend.  He notes that she is breast-feeding and has been taking care of their child almost exclusively for the past week.  He notes that she generally does not drink or use drugs.  She began vomiting's and he called EMS.  Patient is currently sleeping quite soundly, curled up in left lateral recumbent position.  She does not make any attempted verbal replies and simply squeezes her eyes shut and shakes her head when asked any questions.  She appears to be avoiding interaction on a volitional basis.    REVIEW OF SYSTEMS  Unable to obtain from patient, some information obtained from patient's boyfriend who is in the room with her.      PAST MEDICAL HISTORY   has a past medical history of Dental disorder, DLBCL (diffuse large B cell lymphoma) (HCC) (4/14/2017), Osteopenia due to cancer therapy (5/3/2019), Psychiatric problem, and Urinary tract infection (5/3/2019).    SOCIAL HISTORY  Social History     Tobacco Use    Smoking status: Former     Years: 1.00     Types: Cigarettes    Smokeless tobacco: Never   Vaping Use    Vaping Use: Every day    Substances: THC   Substance and Sexual Activity    Alcohol use: Yes     Comment: occasional    Drug use: Yes     Comment: marijuana    Sexual activity: Not on file       SURGICAL HISTORY   has a past surgical history that includes cath placement (Left, 4/13/2017); biopsy general (N/A, 4/13/2017); gastroscopy-endo (4/28/2017); and cath removal (9/21/2017).    CURRENT MEDICATIONS  Home Medications    **Home medications have not yet been reviewed  "for this encounter**         ALLERGIES  No Known Allergies    PHYSICAL EXAM  VITAL SIGNS: /62   Pulse (!) 110   Temp 36.1 °C (96.9 °F) (Temporal)   Resp 18   Ht 1.6 m (5' 3\")   Wt 59 kg (130 lb)   SpO2 94%   Breastfeeding Yes   BMI 23.03 kg/m²  @YOCASTA[[id number]::@   Gen: Soundly in left lateral recumbent position.  Does not open eyes to command but shakes head when asked any questions.  Note strongly of alcohol metabolites  HEENT: No signs of trauma, Bilateral external ears normal, Nose normal. Conjunctiva normal, Non-icteric.   Neck:  No tenderness, Supple, No masses  Lymphatic: No cervical lymphadenopathy noted.   Cardiovascular: Regular rate and rhythm, no murmurs.  Capillary refill less than 3 seconds to all extremities, 2+ distal pulses.  Thorax & Lungs: Normal breath sounds, No respiratory distress, No wheezing bilateral chest rise  Abdomen: Bowel sounds normal, Soft, No apparent tenderness  Skin: Warm, Dry,  Extremities: Intact distal pulses, No edema          COURSE & MEDICAL DECISION MAKING  Patient arrives for evaluation of what appears to be alcohol intoxication with associated nausea and vomiting.  While she does not appear septic or toxic, she does not want interact on my initial evaluation.  Her boyfriend relates information and I feel, given her exam, it is reasonable to continue empiric hydration and symptomatic treatment until she is able to ambulate and be discharged with her boyfriend.  I do not suspect suicidal ideation or self harm attempt and I do not feel further labs or imaging will benefit her .  6:10 AM  Reevaluated patient at bedside.  She is calm, appears tired, and is interactive.  She states that she still feels very tired and only mildly nauseous but is able to take p.o. fluids.  She does want to finish the rest of her IV fluids and wants to be sent home after that.  She had no thoughts of self-harm or harming others and is comfortable with the plan for " discharge.  She will be given a prescription for Zofran and was encouraged to avoid alcohol in general while breast-feeding.  FINAL IMPRESSION  1. Alcoholic intoxication without complication (HCC)    2. Nausea and vomiting, unspecified vomiting type               Electronically signed by: Milind Martinez M.D., 12/17/2022 5:52 AM

## 2023-04-19 NOTE — PROGRESS NOTES
Health Maintenance Due   Topic Date Due   • Hepatitis B Vaccine (1 of 3 - 3-dose series) Never done   • Hepatitis C Screening  Never done       Patient is due for topics as listed above but is not proceeding with Immunization(s) Hep B and Hepatitis C Screening at this time.      Pediatric Hematology/Oncology  Daily Progress Note      Patient Name:  Ngoc Morales  : 2000  MRN: [id number]    Location of Service:  [facility] Pediatric [facility]ensive Care Unit     Date of Service: 2017  Time: 7:30 PM    Hospital Day: 10    Protocol / Treatment Plan:  As per [id number], High Risk Group B, Pre-Phase , Day 3      SUBJECTIVE:     No acute events overnight.  Ngoc remained afebrile with Tmax 37.5.  She did not eat very much overnight, but did stool today.  SHe denies any nausea, vomiting or diarrhea.  She is tolerating her hyperhydration and does not have to urinate too frequently.  Importantly, Ngoc's pain has resolved.  She is no longer requiring any PCA or basal dilaudid.  Ngoc does complain of a splitting headache, 10/10 that is exacerbated by standing up or looking into light.  She is able to rest comfortable if it is dark.  Her headache is not responsive to Tylenol.  She does not complain of any other aches or pains.  She is tolerating her prednisone.      Review of Systems:     Constitutional: Afebrile.  Slept better again last night.    HENT: Negative for auditory changes, runny nose, congestion or nosebleeds.  Not complaining of sore throat.  No mouth sores.  Eyes: Negative for visual changes.  Respiratory: Shortness of breath, chest pain and difficulty breathing overnight, resolved now.  No NC O2 .  Cought +  Cardiovascular: Negative for extremity swelling.  No longer having chest pain.   Gastrointestinal: Negative for nausea, vomiting, abdominal pain, diarrhea, constipation or blood in stool.  Genitourinary: Negative for dysuria.   Musculoskeletal: No longer with pain.  Skin: Negative for rash, signs of infection.  Neurological: Negative for numbness, tingling, sensory changes. 10/10 headaches with standing up.  Not responsive toTylenol.  Endo/Heme/Allergies: Does not bruise/bleed easily.    Psychiatric/Behavioral: No changes in mood, appropriate for age.   "Intermittent anxiety attacks.    OBJECTIVE:     Temp (24hrs), Av.9 °C (98.5 °F), Min:36.5 °C (97.7 °F), Max:37.5 °C (99.5 °F)    Vitals: BP 94/50 mmHg  Pulse 84  Temp(Src) 37.2 °C (98.9 °F)  Resp 20  Ht 1.59 m (5' 2.6\")  Wt 50.3 kg (110 lb 14.3 oz)  BMI 19.90 kg/m2  SpO2 98%  LMP   Breastfeeding? No    Labs:     2017    WBC 11.9 (H)   RBC 3.28 (L)   Hemoglobin 9.1 (L)   Hematocrit 27.1 (L)   MCV 82.6   MCH 27.7   MCHC 33.6   RDW 35.8 (L)   Platelet Count 410   MPV 9.2   Neutrophils-Polys 89.60 (H)   Neutrophils (Absolute) 10.68 (H)   Lymphocytes 7.90 (L)   Lymphs (Absolute) 0.94 (L)   Monocytes 1.60   Monos (Absolute) 0.19   Eosinophils 0.00   Eos (Absolute) 0.00   Basophils 0.10   Baso (Absolute) 0.01   Immature Granulocytes 0.80 (H)   Immature Granulocytes (abs) 0.10 (H)   Nucleated RBC 0.00   NRBC (Absolute) 0.00   Sodium 142   Potassium 3.3 (L)   Chloride 109   Co2 23   Anion Gap 10.0   Glucose 122 (H)   Bun 9   Creatinine 0.47 (L)   Calcium 8.8   Phosphorus 3.3   Uric Acid <1.5 (L)   Ionized Calcium 1.1     Physical Exam:    Constitutional: Well-developed, well-nourished, in minimal distress.  HENT: Normocephalic and atraumatic. No nasal congestion or rhinorrhea. Oropharynx is clear and moist.   Eyes: Conjunctivae are normal. Pupils are equal, round, and reactive to light.    Neck: Normal range of motion of neck, no adenopathy.    Cardiovascular: Normal rate, regular rhythm and normal heart sounds.  No murmur heard. DP/radial pulses 2+, cap refill < 2 sec  Pulmonary/Chest: Effort normal and breath sounds normal. No respiratory distress. Symmetric expansion.  No crackles or wheezes.  Abdomen: Soft. Bowel sounds are normal. Minimal distension and palpable stool.  There is no hepatosplenomegaly.    Genitourinary:  Deferred  Musculoskeletal: Normal range of motion of lower and upper extremities bilaterally. No tenderness to palpation of elbows, wrists, hands.  Lower extremities no longer tender to " palpation.    Lymphadenopathy: No cervical adenopathy, axillary adenopathy or inguinal adenopathy.   Neurological: Alert and oriented to person and place.    Skin: Skin is warm, dry and pink.  No rash or evidence of infection.  Herpetic lesion crusted over on lower lip.    ASSESSMENT AND PLAN:     Ngoc Morales is a 16 y.o. female with newly diagnosed Diffuse Large B-Cell Lymphoma    1) Diffuse Large B-Cell Lymphoma:                 Treatment as per [id number] (NOS), Group B - High Risk (Stage IV, CNS -kristi, CSF -kristi) + Rituximab                     Pre-Phase , Day 3:                           ** Vincristine 1.49 mg (= 1.0 mg/m2) IV x 1 dose completed                          ** Cyclophosphamide 447 mg (= 300 mg/m2) IV x completes                          ** Prednisone 45 mg PO BID x 14 doses,  Dose 5 of 14 completed                          ** Double IT - Methotrexate 15 mg / Hydrocortisone 15 mg IT completed                          ** Rituximab 375 mg/m2 IV scheduled  Day 6 = COPADM1 Day -2     - TLS labs stable, will decrease IVF to 100 mL/m2              - Tumor lysis syndrome labs Q6H at beginning of Pre-Phase , will spread to Q8H in AM              - Daily CBC with differential    2) At Risk for Tumor Lysis Syndrome:              - BMP, Uric Acid, Calcium and Phosphorous continue to remain within normal range              - Obtain labs every 6 hours              - TLS Q8 hours, will space to Q12 if TLS labs remain within normal limits   - Continue allopurinol TID                  3) Headache:   - Intermittent headache, worse with standing   - Photophobia   - Possibly post lumbar puncture spinal headache, have given fluid bolus, caffeine and IVF   - Delivered intrathecal chemotherapy, possible CNS irritation   - No nausea, no vomiting, no Kernig/Brudzinsky    4) Back Pain:              - Secondary to malignancy              - Dramatically improved, not complaining of any pain currently   - No longer on  PCA or receiving basal doses of narcotics              - Flexeril 5 mg PO TID PRN - consider discontinuation tomorrow              - Will discuss with Pediatric Orthopedic Surgeon at [facility] if intervention necessary to prevent long term effects/pathlogical fractures    5) Anemia:              - Stable Hgb at 9.1              - No indication for transufsion              - Transfuse with CMV-, irradiated PRBC for Hgb < 7 or symptomatic    6) Constipation:              - Resolved   - Stooled today    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  [phone]  Office. [phone]

## 2023-07-13 ENCOUNTER — HOSPITAL ENCOUNTER (OUTPATIENT)
Dept: PEDIATRIC HEMATOLOGY/ONCOLOGY | Facility: MEDICAL CENTER | Age: 23
End: 2023-07-13
Attending: PEDIATRICS
Payer: MEDICAID

## 2023-07-13 ENCOUNTER — HOSPITAL ENCOUNTER (OUTPATIENT)
Facility: MEDICAL CENTER | Age: 23
End: 2023-07-13
Attending: PEDIATRICS
Payer: MEDICAID

## 2023-07-13 VITALS
HEART RATE: 88 BPM | BODY MASS INDEX: 26.41 KG/M2 | OXYGEN SATURATION: 97 % | TEMPERATURE: 98.9 F | WEIGHT: 149.03 LBS | DIASTOLIC BLOOD PRESSURE: 76 MMHG | SYSTOLIC BLOOD PRESSURE: 125 MMHG | HEIGHT: 63 IN

## 2023-07-13 DIAGNOSIS — M54.50 LOW BACK PAIN, UNSPECIFIED BACK PAIN LATERALITY, UNSPECIFIED CHRONICITY, UNSPECIFIED WHETHER SCIATICA PRESENT: ICD-10-CM

## 2023-07-13 DIAGNOSIS — Z13.0 SCREENING FOR DISORDER OF BLOOD AND BLOOD-FORMING ORGANS: ICD-10-CM

## 2023-07-13 DIAGNOSIS — Z85.72 HISTORY OF B-CELL LYMPHOMA: ICD-10-CM

## 2023-07-13 DIAGNOSIS — M85.80 OSTEOPENIA DUE TO CANCER THERAPY: ICD-10-CM

## 2023-07-13 LAB
25(OH)D3 SERPL-MCNC: 24 NG/ML (ref 30–100)
ALBUMIN SERPL BCP-MCNC: 4.7 G/DL (ref 3.2–4.9)
ALBUMIN/GLOB SERPL: 1.8 G/DL
ALP SERPL-CCNC: 96 U/L (ref 30–99)
ALT SERPL-CCNC: 16 U/L (ref 2–50)
ANION GAP SERPL CALC-SCNC: 13 MMOL/L (ref 7–16)
AST SERPL-CCNC: 13 U/L (ref 12–45)
BASOPHILS # BLD AUTO: 0.2 % (ref 0–1.8)
BASOPHILS # BLD: 0.02 K/UL (ref 0–0.12)
BILIRUB SERPL-MCNC: 0.2 MG/DL (ref 0.1–1.5)
BUN SERPL-MCNC: 19 MG/DL (ref 8–22)
CALCIUM ALBUM COR SERPL-MCNC: 8.6 MG/DL (ref 8.5–10.5)
CALCIUM SERPL-MCNC: 9.2 MG/DL (ref 8.5–10.5)
CHLORIDE SERPL-SCNC: 106 MMOL/L (ref 96–112)
CO2 SERPL-SCNC: 20 MMOL/L (ref 20–33)
CREAT SERPL-MCNC: 0.92 MG/DL (ref 0.5–1.4)
EOSINOPHIL # BLD AUTO: 0.05 K/UL (ref 0–0.51)
EOSINOPHIL NFR BLD: 0.6 % (ref 0–6.9)
ERYTHROCYTE [DISTWIDTH] IN BLOOD BY AUTOMATED COUNT: 42.7 FL (ref 35.9–50)
GFR SERPLBLD CREATININE-BSD FMLA CKD-EPI: 90 ML/MIN/1.73 M 2
GLOBULIN SER CALC-MCNC: 2.6 G/DL (ref 1.9–3.5)
GLUCOSE SERPL-MCNC: 91 MG/DL (ref 65–99)
HCT VFR BLD AUTO: 41.4 % (ref 37–47)
HGB BLD-MCNC: 13.4 G/DL (ref 12–16)
IMM GRANULOCYTES # BLD AUTO: 0.02 K/UL (ref 0–0.11)
IMM GRANULOCYTES NFR BLD AUTO: 0.2 % (ref 0–0.9)
LYMPHOCYTES # BLD AUTO: 3.05 K/UL (ref 1–4.8)
LYMPHOCYTES NFR BLD: 37.5 % (ref 22–41)
MCH RBC QN AUTO: 28.7 PG (ref 27–33)
MCHC RBC AUTO-ENTMCNC: 32.4 G/DL (ref 32.2–35.5)
MCV RBC AUTO: 88.7 FL (ref 81.4–97.8)
MONOCYTES # BLD AUTO: 0.43 K/UL (ref 0–0.85)
MONOCYTES NFR BLD AUTO: 5.3 % (ref 0–13.4)
NEUTROPHILS # BLD AUTO: 4.56 K/UL (ref 1.82–7.42)
NEUTROPHILS NFR BLD: 56.2 % (ref 44–72)
NRBC # BLD AUTO: 0 K/UL
NRBC BLD-RTO: 0 /100 WBC (ref 0–0.2)
PLATELET # BLD AUTO: 316 K/UL (ref 164–446)
PMV BLD AUTO: 9.7 FL (ref 9–12.9)
POTASSIUM SERPL-SCNC: 4 MMOL/L (ref 3.6–5.5)
PROT SERPL-MCNC: 7.3 G/DL (ref 6–8.2)
RBC # BLD AUTO: 4.67 M/UL (ref 4.2–5.4)
SODIUM SERPL-SCNC: 139 MMOL/L (ref 135–145)
WBC # BLD AUTO: 8.1 K/UL (ref 4.8–10.8)

## 2023-07-13 PROCEDURE — 99214 OFFICE O/P EST MOD 30 MIN: CPT | Performed by: PEDIATRICS

## 2023-07-13 PROCEDURE — 85025 COMPLETE CBC W/AUTO DIFF WBC: CPT

## 2023-07-13 PROCEDURE — 80053 COMPREHEN METABOLIC PANEL: CPT

## 2023-07-13 PROCEDURE — 99212 OFFICE O/P EST SF 10 MIN: CPT | Performed by: PEDIATRICS

## 2023-07-13 PROCEDURE — 36415 COLL VENOUS BLD VENIPUNCTURE: CPT

## 2023-07-13 PROCEDURE — 82306 VITAMIN D 25 HYDROXY: CPT

## 2023-07-13 PROCEDURE — 83520 IMMUNOASSAY QUANT NOS NONAB: CPT

## 2023-07-13 RX ORDER — LAMOTRIGINE 200 MG/1
200 TABLET ORAL DAILY
COMMUNITY

## 2023-07-13 NOTE — PROGRESS NOTES
Pediatric Hematology/Oncology Clinic  Progress Note      Patient Name:  Ngoc Morales  : 2000   MRN: [id number]    Location of Service: [facility]    Date of Service: 2023  Time: 4:38 PM    Primary Care Physician: Pcp Pt States None    HISTORY OF PRESENT ILLNESS:     Chief Complaint: Follow-up of Diffuse Large B-Cell Lymphoma - now 73 months off therapy     History of Present Illness: Ngoc Morales is a 22 y.o.  female who returns to the [facility] for follow-up of her history of Diffuse Large B-Cell Lymphoma, now 73 months off therapy.  She presents to clinic with her mother and toddler son. Ngoc provides accurate interval and clinical history.    Briefly, Ngoc is a previously healthy 22-year-old female who was diagnosed with Diffuse Large B-Cell Lymphoma on 2017.  She presented to medical attention with significant lower back pain and upon MRI in the ER was diagnosed with bone tumor.  A biopsy would prove her disease with a diagnosis of Diffuse Large B-Cell Lymphoma.  Bilateral bone marrow staging on 2017 was performed and found to be positive.  Ultimately she was diagnosed with CNS negative, CSF negative, bone marrow positive and Stage IV disease. Ngoc underwent treatment as per the High Risk, Group B Arm of [id number](NOS).  Her therapy was complicated by Streptococcus viridans bacteremia/sepsis as well as some fever and neutropenia. Ngoc otherwise tolerated her therapy well and completed her course on 2017.  She presents today for her 73 months off therapy visit.    There has been a relative lost to follow-up given COVID, psychosocial reasons as well as the birth of her son.  Interval history is most remarkable for the birth of her child.  She does not have any interval medical concerns to include hospitalization or surgeries other than childbearing.    Today on presentation, Ngoc is in good  clinical health.  She does not report any recent or remote illness.  Energy and activity are at baseline per her report.  She continues to be able to care for her child without any issues or disability. Ngoc denies any headaches, changes in vision or neurologic concerns.  She does not report any problems with respiration to include cough, chest pain or dyspnea.  No complaints of any shortness of breath. Ngoc denies any abdominal or GI complaints to include nausea, vomiting, diarrhea or constipation.  She complains of some left-sided abdominal pain intermittently however this is thought to be possibly ovarian in its timing.  No issues with skin or skin changes.  No pallor.  No easy bruising or bleeding. Ngoc primary complaint at today's visit is that she continues to have intermittent moderate pain in her hips and lower back.  She also reports some pain in her shoulders and neck.  The pain is relatively constant and typically is not severe.  There are no exacerbating factors nor any factors that make it any better.  She has not had any further work-up for her back pain but has been to the chiropractor a couple of times.    Not currently taking any medications with the exception of her lamotrigine and is receiving Depo Provera shots.    Scheduled visits will include OB/GYN soon.    Review of Systems:     Constitutional: Afebrile.  Without recent illness.  Energy and activity are good.  Appetite and oral intake are good.  Has been gaining weight, not losing weight.  HENT: Negative for ear pain, nasal congestion or rhinorrhea, nosebleeds and sore throat.  No mouth sores.  Eyes: Negative for visual changes.  Respiratory: Negative for shortness of breath.  No cough.  No dyspnea.  Cardiovascular: Negative.  Gastrointestinal: Negative for nausea, vomiting, abdominal pain, diarrhea, constipation or blood in stool.    Genitourinary: Negative.  Musculoskeletal: Negative.  Skin: Negative for rash, signs of  infection.  Neurological: Negative for numbness, tingling, sensory changes, weakness or headaches.    Endo/Heme/Allergies: Does not bruise/bleed easily.    Psychiatric/Behavioral: No changes in mood, appropriate for age.     PAST MEDICAL HISTORY:     Oncology History:  Diagnosed with Diffuse Large B-Cell Lymphoma 4/11/17  Confirmed Diagnosis with bilateral bone marrow staging 4/13/17  Diffuse Large B-Cell Lymphoma, CNS -kristi, CSF -kristi, bone marrow +kristi, Stage IV  Treatment per High Risk, Group B ([id number])  Consent for treatment signed by mother 4/14/17  Pre-Phase R- started 4/14/17  Tolerated well, no TLS, only complication was LP related headache  End of R- evaluation with bilateral bone marrow biopsies/aspirates remarkable for complete/near complete response  R-COPADM1 started 4/21/17  Complicated by Streptococcus viridans bacteremia/sepsis, hematemesis, HSV1 reactivation, oppiod induced constipation  End of R-COPADM1 evaluation with PET-CT scan remarkable for near complete response, area of focal activity in left iliac crest  Recovery of counts (COPADM1) at Day 12, start of R-COPADM2 Day 1 at R-COPADM1 Day 18  R-COPADM2 started 5/8/17  Complicated by severe opioid constipation, prolonged fever  Recovery of counts (R-COPADM2) at Day 16, start of R-CYM1 Day1 today at R-COPADM2 Day 18    No End of R-COPADM2 evaluation, next response evaluation at end of RCYM-1  R-CYM1 started 5/25/17  No complications of therapy.  Initial recovery of ANC at Day 15, subsequent drop at Day 21, true recovery at Day 27  End of R-CYM1 PET-CT scan demonstrated near complete/complete response (some very mild asymmetric activity L proximal femur)  End of R-CYM1 bone marrow biopsy and aspirate of left iliac crest negative for disease  Start of R-CYM2 6/21/17  Recovery and End of R-CYM2 6/28/17     End of Therapy 6/28/2017     Past Medical History:     1) Major Depressive Disorder  2) History of Polysubstance Abuse  3) Anxiety  4)  "Diffuse Large B-Cell Lymphoma  5) Prolonged Viral Illnes (Post-Rituximab)  6) Osteopenia (therapy induced)  7) Recurrent urinary tract infections  8) Pregnancy     Past Surgical History:      1) IR Bone Biopsy  2) Port a cath placement  3) Lumbar punctures, treatment related  4) Bilateral bone marrow biopsy x 2  5) Unilateral bone marrow biopsy x 1     Menstrual History:  No longer having menstrual periods, 8 weeks pregnant     Allergies:         Allergies as of 10/31/2018    (No Known Allergies)      Social History:   Living with boyfriend.  Pregnant.  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  Decreased marijuana use.     Family History:  Maternal family history remarkable for breast cancer in maternal grandmother, melanoma in uncle and benign brain tumor in mother following childbirth.  No remarkable paternal family history.  No family history of pediatric disease, no family history of pediatric cancer.  No autoimmune disease, no rheumatological disease.  No bleeding, clotting disorders.    Medications:   Current Outpatient Medications on File Prior to Encounter   Medication Sig Dispense Refill    lamotrigine (LAMICTAL) 200 MG tablet Take 200 mg by mouth every day.      ondansetron (ZOFRAN ODT) 4 MG TABLET DISPERSIBLE Take 1 Tablet by mouth every 8 hours as needed for Nausea/Vomiting. (Patient not taking: Reported on 7/13/2023) 10 Tablet 0    ibuprofen (MOTRIN) 800 MG Tab Take 1 Tablet by mouth 3 times a day as needed. (Patient not taking: Reported on 7/13/2023) 30 Tablet 1     No current facility-administered medications on file prior to encounter.       OBJECTIVE:     Vitals:   /76 (BP Location: Right arm, Patient Position: Sitting, BP Cuff Size: Adult)   Pulse 88   Temp 37.2 °C (98.9 °F) (Temporal)   Ht 1.6 m (5' 2.99\")   Wt 67.6 kg (149 lb 0.5 oz)   SpO2 97%     Labs:    Hospital Outpatient Visit on 07/13/2023   Component Date Value    WBC 07/13/2023 8.1     RBC 07/13/2023 4.67     Hemoglobin 07/13/2023 13.4  "    Hematocrit 07/13/2023 41.4     MCV 07/13/2023 88.7     MCH 07/13/2023 28.7     MCHC 07/13/2023 32.4     RDW 07/13/2023 42.7     Platelet Count 07/13/2023 316     MPV 07/13/2023 9.7     Neutrophils-Polys 07/13/2023 56.20     Lymphocytes 07/13/2023 37.50     Monocytes 07/13/2023 5.30     Eosinophils 07/13/2023 0.60     Basophils 07/13/2023 0.20     Immature Granulocytes 07/13/2023 0.20     Nucleated RBC 07/13/2023 0.00     Neutrophils (Absolute) 07/13/2023 4.56     Lymphs (Absolute) 07/13/2023 3.05     Monos (Absolute) 07/13/2023 0.43     Eos (Absolute) 07/13/2023 0.05     Baso (Absolute) 07/13/2023 0.02     Immature Granulocytes (a* 07/13/2023 0.02     NRBC (Absolute) 07/13/2023 0.00     Sodium 07/13/2023 139     Potassium 07/13/2023 4.0     Chloride 07/13/2023 106     Co2 07/13/2023 20     Anion Gap 07/13/2023 13.0     Glucose 07/13/2023 91     Bun 07/13/2023 19     Creatinine 07/13/2023 0.92     Calcium 07/13/2023 9.2     AST(SGOT) 07/13/2023 13     ALT(SGPT) 07/13/2023 16     Alkaline Phosphatase 07/13/2023 96     Total Bilirubin 07/13/2023 0.2     Albumin 07/13/2023 4.7     Total Protein 07/13/2023 7.3     Globulin 07/13/2023 2.6     A-G Ratio 07/13/2023 1.8     25-Hydroxy   Vitamin D 25 07/13/2023 24 (L)     Correct Calcium 07/13/2023 8.6     GFR (CKD-EPI) 07/13/2023 90        Physical Exam:    Constitutional: Well-developed, well-nourished, and in no distress.  Well appearing.  HENT: Normocephalic and atraumatic. No nasal congestion or rhinorrhea. Oropharynx is clear and moist. No oral ulcerations or sores.    Eyes: Conjunctivae are normal. Pupils are equal, round, and reactive to light.    Neck: Normal range of motion of neck, no adenopathy.    Cardiovascular: Normal rate, regular rhythm and normal heart sounds.  No murmur heard. DP/radial pulses 2+, cap refill < 2 sec  Pulmonary/Chest: Effort normal and breath sounds normal. No respiratory distress. Symmetric expansion.  No crackles or wheezes.  Abdomen:  Soft. Bowel sounds are normal. No distension and no mass. There is no hepatosplenomegaly.    Genitourinary:  Deferred  Musculoskeletal: Normal range of motion of lower and upper extremities bilaterally. No tenderness to palpation of elbows, wrists, hands, knees, ankles and feet bilaterally.   Lymphadenopathy: No cervical adenopathy, axillary adenopathy or inguinal adenopathy.   Neurological: Alert and oriented to person and place. Exhibits normal muscle tone bilaterally in upper and lower extremities. Gait normal. Coordination normal.    Skin: Skin is warm, dry and pink.  No rash or evidence of skin infection.  No pallor.   Psychiatric: Mood and affect normal for age.      ASSESSMENT AND PLAN:     Ngoc Morales is a 22 y.o. female with history of Diffuse Large B-Cell Lymphoma s/p treatment as per [id number] with Rituximab (NOT ON STUDY)     1) Diffuse Large B-Cell Lymphoma:              - Treatment as per [id number] (NOS), Group B - High Risk (Stage IV, CNS -kristi, CSF -kristi) + Rituximab              - Off therapy now 73 months     - CBC with WBC 8.1, Hgb 13.4, platelets 316   - ANC 4560, ALC 3050   - AST 13, ALT 16 with total bilirubin 0.2   - Vitamin D 24     - Both labs and physical examination reassuring and DERICK                            - RTC in 12 months or sooner if needed, establish with primary care to transition care     2) Monitoring Labs:              - ANC 4560, ALC 3050   - AST 13, ALT 16 with total bilirubin 0.2   - Vitamin D 24     3) Survivorship/Late Effects Monitoring:              - Cognitive: No identified cognitive deficits or learning disabilities.              - Psychosocial:  No changes in behavior.              - Renal:  /76.  Electrolytes normal with creatinine 0.92, continue to monitor              - Cardiac: 120 mg/m2 cumulative dose of doxorubicin.  No radiation.  Given low-dose anthracycline without radiation and age at treatment, recommendation for ECHO every 5 years.  ECHO obtained  10/2022 demonstrating EF of 60% but with mild tricuspid regurgitation.  Plan for repeat ECHO 2027.              - Pulmonary:  No chest/mediastinal radiation, no exposure to chemotherapies with pulmonary toxicity.              - Musckuloskeletal: History of osteopenia as diagnosed on DEXA scan 2/20/2018, very poorly adherent with vitamin D supplementation.  No recent DEXA scan.  Complains of persistent lower back and hip pain.  Will evaluate with plain radiographs however DEXA scan recommended to reevaluate bone mineral density.  Also at risk given Depo-Provera.  Vitamin D today 24, encourage supplementation with vitamin D              - Growth and Development: No concerns.              - Reproductive: Has demonstrated fertility.  Currently mother.  Asking in clinic about future fertility and risk of premature menopause.  Anti-müllerian hormone ordered again today.              - Lansky: 100     4) History of hypovitaminosis D:              - Non-adherent with vitamin D              - Re encouraged vitamin D and calcium supplementation given history of osteopenia       5) Osteopenia:              - Had previously recommended yearly DEXA scan until improvement in osteopenia however has not had any recent DEXA scans              - Encourage DEXA scan              - Continue calcium and vitamin D     6) Polysubstance Abuse History:              - Continues to vape, denies other drug use              - Encouraged abstinence of all substances      Disposition:  RTC 12 months    Jose Alfredo Theodore MD  Pediatric Hematology / Oncology  [facility]  Cell.  [phone]  Office. [phone]

## 2023-07-17 LAB — MIS SERPL-MCNC: 1.18 NG/ML (ref 0.4–16.02)

## 2023-07-25 ENCOUNTER — TELEPHONE (OUTPATIENT)
Dept: HEALTH INFORMATION MANAGEMENT | Facility: OTHER | Age: 23
End: 2023-07-25
Payer: MEDICAID

## 2023-10-15 ENCOUNTER — OFFICE VISIT (OUTPATIENT)
Dept: URGENT CARE | Facility: CLINIC | Age: 23
End: 2023-10-15
Payer: COMMERCIAL

## 2023-10-15 ENCOUNTER — HOSPITAL ENCOUNTER (OUTPATIENT)
Facility: MEDICAL CENTER | Age: 23
End: 2023-10-15
Attending: NURSE PRACTITIONER
Payer: COMMERCIAL

## 2023-10-15 VITALS
OXYGEN SATURATION: 98 % | HEART RATE: 106 BPM | WEIGHT: 149 LBS | TEMPERATURE: 97.9 F | SYSTOLIC BLOOD PRESSURE: 102 MMHG | DIASTOLIC BLOOD PRESSURE: 60 MMHG | RESPIRATION RATE: 14 BRPM | BODY MASS INDEX: 26.4 KG/M2 | HEIGHT: 63 IN

## 2023-10-15 DIAGNOSIS — Z78.9 MEDICATION INTOLERANCE: ICD-10-CM

## 2023-10-15 DIAGNOSIS — Z87.440 HISTORY OF UTI: ICD-10-CM

## 2023-10-15 DIAGNOSIS — R30.0 DYSURIA: ICD-10-CM

## 2023-10-15 LAB
APPEARANCE UR: NORMAL
BILIRUB UR STRIP-MCNC: NEGATIVE MG/DL
COLOR UR AUTO: NORMAL
GLUCOSE UR STRIP.AUTO-MCNC: NEGATIVE MG/DL
KETONES UR STRIP.AUTO-MCNC: NORMAL MG/DL
LEUKOCYTE ESTERASE UR QL STRIP.AUTO: NEGATIVE
NITRITE UR QL STRIP.AUTO: NEGATIVE
PH UR STRIP.AUTO: 5.5 [PH] (ref 5–8)
POCT INT CON NEG: NEGATIVE
POCT INT CON POS: POSITIVE
POCT URINE PREGNANCY TEST: NEGATIVE
PROT UR QL STRIP: NEGATIVE MG/DL
RBC UR QL AUTO: NORMAL
SP GR UR STRIP.AUTO: 1.03
UROBILINOGEN UR STRIP-MCNC: 0.2 MG/DL

## 2023-10-15 PROCEDURE — 3078F DIAST BP <80 MM HG: CPT | Performed by: NURSE PRACTITIONER

## 2023-10-15 PROCEDURE — 3074F SYST BP LT 130 MM HG: CPT | Performed by: NURSE PRACTITIONER

## 2023-10-15 PROCEDURE — 81025 URINE PREGNANCY TEST: CPT | Performed by: NURSE PRACTITIONER

## 2023-10-15 PROCEDURE — 81002 URINALYSIS NONAUTO W/O SCOPE: CPT | Performed by: NURSE PRACTITIONER

## 2023-10-15 PROCEDURE — 87086 URINE CULTURE/COLONY COUNT: CPT

## 2023-10-15 PROCEDURE — 99203 OFFICE O/P NEW LOW 30 MIN: CPT | Performed by: NURSE PRACTITIONER

## 2023-10-15 RX ORDER — HYDROXYZINE PAMOATE 25 MG/1
CAPSULE ORAL
COMMUNITY
Start: 2023-09-27

## 2023-10-15 RX ORDER — SULFAMETHOXAZOLE AND TRIMETHOPRIM 800; 160 MG/1; MG/1
TABLET ORAL
COMMUNITY
Start: 2023-10-08 | End: 2023-12-19

## 2023-10-15 RX ORDER — DEXTROAMPHETAMINE SACCHARATE, AMPHETAMINE ASPARTATE, DEXTROAMPHETAMINE SULFATE AND AMPHETAMINE SULFATE 2.5; 2.5; 2.5; 2.5 MG/1; MG/1; MG/1; MG/1
TABLET ORAL
COMMUNITY
Start: 2023-09-07

## 2023-10-15 RX ORDER — ALPRAZOLAM 0.25 MG/1
TABLET ORAL
COMMUNITY
Start: 2023-09-26

## 2023-10-15 ASSESSMENT — FIBROSIS 4 INDEX: FIB4 SCORE: 0.23

## 2023-10-15 NOTE — PROGRESS NOTES
Ngoc Morales is a 22 y.o. female who presents for UTI (Pt states was last seen at  in [place], blood in urine, currently taking RX bactrim, nausea )    Accompanied by her mother today  HPI  This is a new problem. Ngoc Morales is a 22 y.o. patient who presents to urgent care with c/o: C/O UTI for 1-2 months intermittently.  Last week she went to [facility] urgent care. She reports that her symptoms had been getting worse. She was told she had UTI and was given Bactrim for 10 days. She started taking it for the past 3 days. She was feeling dizzy and nauseated after taking RX. She then stopped taking medication in the morning and then took at night. Then later that night she was still hurting. Now taking AZO for pain which helps- none for the past 12 hours. This is helping with the pain. She tried to call the provider but was told that she needed to come back in to be seen again. Used Heat pad which helped her pain. Denies fever, NV, vaginal discharge.           ROS See HPI    Allergies:       Allergies   Allergen Reactions    Bactrim [Sulfamethoxazole W-Trimethoprim]      NAUSEA        PMSFS Hx:  Past Medical History:   Diagnosis Date    Dental disorder     front tooth flipper    DLBCL (diffuse large B cell lymphoma) (HCC) 4/14/2017    Osteopenia due to cancer therapy 5/3/2019    Psychiatric problem     depression, anxiety    Urinary tract infection 5/3/2019     Past Surgical History:   Procedure Laterality Date    CATH REMOVAL  9/21/2017    Procedure: CATH REMOVAL- PORT;  Surgeon: Yoli Grullon M.D.;  Location: SURGERY [facility];  Service: General    GASTROSCOPY-ENDO  4/28/2017    Procedure: GASTROSCOPY-ENDO;  Surgeon: Everette Jett M.D.;  Location: ENDOSCOPY [facility];  Service:     CATH PLACEMENT Left 4/13/2017    Procedure: PORT PLACEMENT  ;  Surgeon: Yoli Grullon M.D.;  Location: SURGERY [facility];  Service:     BIOPSY GENERAL N/A 4/13/2017    Procedure: BONE MARROW BIOPSY  "AND ASPIRATION; LUMBAR PUNCTURE  ;  Surgeon: Yoli Grullon M.D.;  Location: SURGERY [facility];  Service:      No family history on file.  Social History     Tobacco Use    Smoking status: Former     Types: Cigarettes    Smokeless tobacco: Never   Substance Use Topics    Alcohol use: Yes     Comment: occasional       Problems:   Patient Active Problem List   Diagnosis    History of B-cell lymphoma    Osteopenia due to cancer therapy    Labor and delivery indication for care or intervention       Medications:   Current Outpatient Medications on File Prior to Visit   Medication Sig Dispense Refill    ALPRAZolam (XANAX) 0.25 MG Tab TAKE 1 TABLET BY MOUTH ONCE A DAY AS NEEDED FOR AXIETY      amphetamine-dextroamphetamine (ADDERALL) 10 MG Tab TAKE 1 TABLET BY MOUTH TWICE A DAY AM AND NOON      hydrOXYzine pamoate (VISTARIL) 25 MG Cap TAKE 1 CAPSULE BY MOUTH TWICE DAILY AS NEEDED FOR INSOMNIA OR ANXIETY      methylPREDNISolone Acetate (DEPO-MEDROL INJ) Inject  as directed.      lamotrigine (LAMICTAL) 200 MG tablet Take 200 mg by mouth every day.      sulfamethoxazole-trimethoprim (BACTRIM DS) 800-160 MG tablet TAKE 1 TABLET BY MOUTH TWICE DAILY FOR 10 DAYS WITH FOOD      ondansetron (ZOFRAN ODT) 4 MG TABLET DISPERSIBLE Take 1 Tablet by mouth every 8 hours as needed for Nausea/Vomiting. (Patient not taking: Reported on 7/13/2023) 10 Tablet 0    ibuprofen (MOTRIN) 800 MG Tab Take 1 Tablet by mouth 3 times a day as needed. (Patient not taking: Reported on 7/13/2023) 30 Tablet 1     No current facility-administered medications on file prior to visit.          Objective:     /60 (BP Location: Right arm, Patient Position: Sitting, BP Cuff Size: Adult)   Pulse (!) 106   Temp 36.6 °C (97.9 °F) (Temporal)   Resp 14   Ht 1.6 m (5' 3\")   Wt 67.6 kg (149 lb)   SpO2 98%   BMI 26.39 kg/m²     Physical Exam  Vitals reviewed.   Cardiovascular:      Rate and Rhythm: Normal rate.      Pulses: Normal pulses.   Pulmonary: "      Effort: Pulmonary effort is normal.   Abdominal:      Tenderness: There is no right CVA tenderness or left CVA tenderness.   Skin:     General: Skin is warm.      Capillary Refill: Capillary refill takes less than 2 seconds.   Neurological:      Mental Status: She is alert and oriented to person, place, and time.   Psychiatric:         Mood and Affect: Mood normal.         Behavior: Behavior normal.         Thought Content: Thought content normal.             Assessment /Associated Orders:      1. Dysuria  POCT Pregnancy    POCT Urinalysis    URINE CULTURE(NEW)      2. Medication intolerance        3. History of UTI  URINE CULTURE(NEW)          Medical Decision Making:    Ngoc is a very pleasant 22 y.o. female who is clinically stable at today's acute urgent care visit.  No acute distress noted.  VSS. Appropriate for outpatient care at this time.   Acute problem today with uncertain prognosis. UA not showing significant UTI present. Concentrated sample. Encouraged pt to increase fluid intake.     Pt intolerant of Bactrim that she was given.  Stop taking Bactrim   Urine culture: pending - will treat based on urine culture results.   Pt is uncertain if her previous provider cultured her urine or not.   OTC  analgesic of choice (acetaminophen or NSAID) prn pain. Follow manufactures dosing and safety precautions.   Avoid bladder irritating foods and beverages.   Discussed Dx, management options (risks,benefits, and alternatives to planned treatment), natural progression and supportive care.  Expressed understanding and the treatment plan was agreed upon.   Questions were encouraged and answered   Return to urgent care prn if new or worsening sx or if there is no improvement in condition prn.          Time I spent evaluating Ngoc Morales in urgent care today was 31  minutes.   Greater than 50 % of this visit was spent face to face with pt in counseling and coordination of care.   This time includes preparing  for visit, reviewing any pertinent notes or test results, counseling/education, exam, obtaining HPI, interpretation of lab tests, medication management and documentation as indicated above.Time does not include separately billable procedures noted .       Please note that this dictation was created using voice recognition software. I have worked with consultants from the vendor as well as technical experts from [facility] to optimize the interface. I have made every reasonable attempt to correct obvious errors, but I expect that there are errors of grammar and possibly content that I did not discover before finalizing the note.  This note was electronically signed by provider

## 2023-10-16 DIAGNOSIS — Z87.440 HISTORY OF UTI: ICD-10-CM

## 2023-10-16 DIAGNOSIS — R30.0 DYSURIA: ICD-10-CM

## 2023-10-19 LAB
BACTERIA UR CULT: NORMAL
SIGNIFICANT IND 70042: NORMAL
SITE SITE: NORMAL
SOURCE SOURCE: NORMAL

## 2023-12-19 ENCOUNTER — HOSPITAL ENCOUNTER (OUTPATIENT)
Dept: RADIOLOGY | Facility: MEDICAL CENTER | Age: 23
End: 2023-12-19
Attending: PEDIATRICS
Payer: COMMERCIAL

## 2023-12-19 ENCOUNTER — OFFICE VISIT (OUTPATIENT)
Dept: MEDICAL GROUP | Facility: PHYSICIAN GROUP | Age: 23
End: 2023-12-19
Payer: COMMERCIAL

## 2023-12-19 VITALS
HEART RATE: 90 BPM | TEMPERATURE: 98.2 F | OXYGEN SATURATION: 98 % | DIASTOLIC BLOOD PRESSURE: 70 MMHG | HEIGHT: 63 IN | WEIGHT: 140 LBS | SYSTOLIC BLOOD PRESSURE: 106 MMHG | BODY MASS INDEX: 24.8 KG/M2

## 2023-12-19 DIAGNOSIS — M85.80 OSTEOPENIA DUE TO CANCER THERAPY: ICD-10-CM

## 2023-12-19 DIAGNOSIS — M54.42 CHRONIC BILATERAL LOW BACK PAIN WITH BILATERAL SCIATICA: ICD-10-CM

## 2023-12-19 DIAGNOSIS — G89.29 CHRONIC BILATERAL LOW BACK PAIN WITH BILATERAL SCIATICA: ICD-10-CM

## 2023-12-19 DIAGNOSIS — Z85.72 HISTORY OF B-CELL LYMPHOMA: ICD-10-CM

## 2023-12-19 DIAGNOSIS — M54.41 CHRONIC BILATERAL LOW BACK PAIN WITH BILATERAL SCIATICA: ICD-10-CM

## 2023-12-19 PROCEDURE — 3074F SYST BP LT 130 MM HG: CPT | Performed by: NURSE PRACTITIONER

## 2023-12-19 PROCEDURE — 99215 OFFICE O/P EST HI 40 MIN: CPT | Performed by: NURSE PRACTITIONER

## 2023-12-19 PROCEDURE — 72100 X-RAY EXAM L-S SPINE 2/3 VWS: CPT

## 2023-12-19 PROCEDURE — 3078F DIAST BP <80 MM HG: CPT | Performed by: NURSE PRACTITIONER

## 2023-12-19 PROCEDURE — 73521 X-RAY EXAM HIPS BI 2 VIEWS: CPT

## 2023-12-19 RX ORDER — MELOXICAM 7.5 MG/1
15 TABLET ORAL DAILY
Qty: 60 TABLET | Refills: 1 | Status: SHIPPED | OUTPATIENT
Start: 2023-12-19

## 2023-12-19 ASSESSMENT — ENCOUNTER SYMPTOMS
WEIGHT LOSS: 0
TINGLING: 1
DIZZINESS: 1
CHILLS: 0
MYALGIAS: 1
FEVER: 0
BACK PAIN: 1
SHORTNESS OF BREATH: 0
HEADACHES: 0
FALLS: 0

## 2023-12-19 ASSESSMENT — FIBROSIS 4 INDEX: FIB4 SCORE: 0.23

## 2023-12-19 ASSESSMENT — PATIENT HEALTH QUESTIONNAIRE - PHQ9: CLINICAL INTERPRETATION OF PHQ2 SCORE: 0

## 2023-12-19 NOTE — PROGRESS NOTES
Subjective:     CC: Establish care and back pain    HPI:   Ngoc is a 22 y.o. female presents to [facility]. Previous PCP: None. Specialists: Oncology (Dr. Theodore), psychiatry, OB/GYN. Pt would like to discuss the following today:    Problem   Osteopenia Due to Cancer Therapy    Her last DEXA scan in 2018 revealed osteopenia.      History of B-Cell Lymphoma    Dx in 4/2017 with Diffuse Large B-Cell Lymphoma. S/P chemotherapy. She is followed by Dr. Theodore annually. Recently had labs in 07/2023 that were within normal limits.     Chronic Bilateral Low Back Pain With Bilateral Sciatica    Onset: 2 months of constant pain, but has had pain for 5-6 years intermittently  Location: lower back and bilateral hips (R>L)  Duration: constant  Intensity: 5-6/10, can get up to 9-10/10  Radiation: yes, radiating down her lateral legs   Aggravating factors: prolonged sitting/standing, walking up stairs, when waking in the morning, she has to sit at the edge her bed for 30 minutes before she can tolerate standing  Alleviating factors: heating pad, chiropractic (but was advised to stop by Dr. Theodore)  Associated symptoms: tingling in her bilateral feet after prolonged sitting (onset 2 weeks ago)  OTC remedies: 440mg of Aleve with only provided 1 hour of relief, Tylenol Arthritis and IBU both did not help   She has consulted with Dr. Theodore, who recommended taking 2 tablets of Aleve  Denies saddle anesthesia, or bowel/bladder incontinence.   She has discussed this concern with her oncologist and referred to PT. She tried doing PT, but reports it made it worse.   Of note, reports that she initially developed lumbar back pain in 2017, which ultimately led to the diagnosis of her cancer.         Health Maintenance/Immunizations: Completed, declined flu and she is scheduled for cervical CA screening through GYN    Current Outpatient Medications   Medication Sig Dispense Refill    ALPRAZolam (XANAX) 0.25 MG Tab TAKE 1 TABLET BY MOUTH  ONCE A DAY AS NEEDED FOR AXIETY      amphetamine-dextroamphetamine (ADDERALL) 10 MG Tab TAKE 1 TABLET BY MOUTH TWICE A DAY AM AND NOON      hydrOXYzine pamoate (VISTARIL) 25 MG Cap TAKE 1 CAPSULE BY MOUTH TWICE DAILY AS NEEDED FOR INSOMNIA OR ANXIETY      methylPREDNISolone Acetate (DEPO-MEDROL INJ) Inject  as directed.      lamotrigine (LAMICTAL) 200 MG tablet Take 200 mg by mouth every day.      sulfamethoxazole-trimethoprim (BACTRIM DS) 800-160 MG tablet TAKE 1 TABLET BY MOUTH TWICE DAILY FOR 10 DAYS WITH FOOD (Patient not taking: Reported on 12/19/2023)      ondansetron (ZOFRAN ODT) 4 MG TABLET DISPERSIBLE Take 1 Tablet by mouth every 8 hours as needed for Nausea/Vomiting. (Patient not taking: Reported on 7/13/2023) 10 Tablet 0    ibuprofen (MOTRIN) 800 MG Tab Take 1 Tablet by mouth 3 times a day as needed. (Patient not taking: Reported on 7/13/2023) 30 Tablet 1     No current facility-administered medications for this visit.     Past Medical History:   Diagnosis Date    Dental disorder     front tooth flipper    DLBCL (diffuse large B cell lymphoma) (HCC) 4/14/2017    Osteopenia due to cancer therapy 5/3/2019    Psychiatric problem     depression, anxiety    Urinary tract infection 5/3/2019     Past Surgical History:   Procedure Laterality Date    CATH REMOVAL  9/21/2017    Procedure: CATH REMOVAL- PORT;  Surgeon: Yoli Grullon M.D.;  Location: SURGERY [facility];  Service: General    GASTROSCOPY-ENDO  4/28/2017    Procedure: GASTROSCOPY-ENDO;  Surgeon: Everette Jett M.D.;  Location: ENDOSCOPY [facility];  Service:     CATH PLACEMENT Left 4/13/2017    Procedure: PORT PLACEMENT  ;  Surgeon: Yoli Grullon M.D.;  Location: SURGERY [facility];  Service:     BIOPSY GENERAL N/A 4/13/2017    Procedure: BONE MARROW BIOPSY AND ASPIRATION; LUMBAR PUNCTURE  ;  Surgeon: Yoli Grullon M.D.;  Location: SURGERY [facility];  Service:       No family history on file.    Social History     Tobacco  "Use    Smoking status: Former     Types: Cigarettes    Smokeless tobacco: Never   Vaping Use    Vaping Use: Every day    Substances: THC   Substance Use Topics    Alcohol use: Yes     Comment: occasional    Drug use: Yes     Comment: marijuana      Review of Systems   Constitutional:  Negative for chills, fever, malaise/fatigue and weight loss.   Respiratory:  Negative for shortness of breath.    Cardiovascular:  Negative for chest pain.   Musculoskeletal:  Positive for back pain and myalgias. Negative for falls.   Neurological:  Positive for dizziness and tingling. Negative for headaches.        Objective:     /70 (BP Location: Right arm, Patient Position: Sitting, BP Cuff Size: Adult)   Pulse 90   Temp 36.8 °C (98.2 °F) (Temporal)   Ht 1.6 m (5' 3\")   Wt 63.5 kg (140 lb)   SpO2 98%   BMI 24.80 kg/m²  Body mass index is 24.8 kg/m².     Physical Exam  Constitutional:       Appearance: Normal appearance.   Eyes:      Conjunctiva/sclera: Conjunctivae normal.      Pupils: Pupils are equal, round, and reactive to light.   Cardiovascular:      Rate and Rhythm: Normal rate and regular rhythm.      Heart sounds: Normal heart sounds. No murmur heard.  Pulmonary:      Effort: Pulmonary effort is normal. No respiratory distress.      Breath sounds: Normal breath sounds.   Musculoskeletal:      Right lower leg: No edema.      Left lower leg: No edema.      Comments: Back: Flexion significantly limited by pain, lateral bending to the R elicits pain, 3/5 LE strength, sensation intact bilaterally in LE, TTP over lumbar spinous processes, paraspinals TTP, R SI joint TTP, straight leg raise positive, Eduin test negative bilaterally     Lymphadenopathy:      Cervical: No cervical adenopathy.   Skin:     General: Skin is warm and dry.   Neurological:      General: No focal deficit present.      Mental Status: She is alert and oriented to person, place, and time.      Cranial Nerves: Cranial nerves 2-12 are intact.      " Sensory: Sensation is intact.      Motor: Weakness (lower leg weakness s/t pain) present.      Coordination: Coordination is intact.      Gait: Gait is intact.   Psychiatric:         Mood and Affect: Mood normal.         Behavior: Behavior normal.        Assessment and Plan:   22 y.o. female with the following -    1. Chronic bilateral low back pain with bilateral sciatica  Acute on chronic, unknown prognosis.  She has been experiencing progressively worsening lumbar back pain over the last 2 months.  She had previously discussed this with her oncologist in July 2023, at which point he ordered plain films of her lumbar back and hips. I recommended she complete these images today. I am keeping a low threshold for MRI imaging to evaluate this further given the severity of her pain and her history of malignancy. For now, we discussed trial with meloxicam and over-the-counter Tylenol to help control pain.  Will schedule a close follow-up.  - meloxicam (MOBIC) 7.5 MG Tab; Take 2 Tablets by mouth every day.  Dispense: 60 Tablet; Refill: 1    2. History of B-cell lymphoma  Chronic. Continue follow up with oncology as directed. I reviewed her last visit note with Oncology dated 07/13/2023.     3. Osteopenia due to cancer therapy  This was diagnosed in 2018.  She is not currently taking calcium or vitamin D3 supplementation.  She is also at high risk given her current choice of contraceptive (Depo Provera).  Strongly recommended she start taking calcium and vitamin D3 supplementation.  I will evaluate further with a DEXA scan.  - DS-BONE DENSITY STUDY (DEXA); Future    I spent a total of 45 minutes with record review, exam, communication with the patient, communication with other providers, and documentation of this encounter.    Return in about 2 weeks (around 1/2/2024) for back pain follow up .    Please note that this dictation was created using voice recognition software. I have made every reasonable attempt to correct  obvious errors, but I expect that there are errors of grammar and possibly content that I did not discover before finalizing the note.

## 2023-12-20 ENCOUNTER — TELEPHONE (OUTPATIENT)
Dept: MEDICAL GROUP | Facility: PHYSICIAN GROUP | Age: 23
End: 2023-12-20
Payer: COMMERCIAL

## 2023-12-20 NOTE — TELEPHONE ENCOUNTER
Caller Name: Ngoc   Call Back Number: [phone]    How would the patient prefer to be contacted with a response: Phone call OK to leave a detailed message    2. Patient is requesting imaging - 12/19/2023  results dated: 12/19/2023    3. Confirmed results are in chart. Patient advised they will be contacted once interpreted by provider.

## 2024-04-12 ENCOUNTER — HOSPITAL ENCOUNTER (OUTPATIENT)
Dept: RADIOLOGY | Facility: MEDICAL CENTER | Age: 24
End: 2024-04-12
Attending: NURSE PRACTITIONER
Payer: COMMERCIAL

## 2024-04-12 DIAGNOSIS — M85.80 OSTEOPENIA DUE TO CANCER THERAPY: ICD-10-CM

## 2024-04-12 PROCEDURE — 77080 DXA BONE DENSITY AXIAL: CPT

## 2024-05-02 ENCOUNTER — DOCUMENTATION (OUTPATIENT)
Dept: HEALTH INFORMATION MANAGEMENT | Facility: OTHER | Age: 24
End: 2024-05-02
Payer: COMMERCIAL

## 2024-05-09 NOTE — PROGRESS NOTES
"Pt. C/o feeling pressure in her bladder. \"like when you have a bladder infection but not the urgency to go with it.\"  " As of today, there are no sooner appts. Will continue to keep an eye out for cancellations.

## 2024-05-29 DIAGNOSIS — M85.80 OSTEOPENIA DUE TO CANCER THERAPY: ICD-10-CM

## 2024-05-29 DIAGNOSIS — Z85.72 HISTORY OF B-CELL LYMPHOMA: ICD-10-CM

## 2024-05-29 DIAGNOSIS — M54.50 LOW BACK PAIN, UNSPECIFIED BACK PAIN LATERALITY, UNSPECIFIED CHRONICITY, UNSPECIFIED WHETHER SCIATICA PRESENT: ICD-10-CM

## 2024-06-25 ENCOUNTER — OFFICE VISIT (OUTPATIENT)
Dept: PHYSICAL MEDICINE AND REHAB | Facility: MEDICAL CENTER | Age: 24
End: 2024-06-25
Payer: COMMERCIAL

## 2024-06-25 VITALS
HEART RATE: 86 BPM | OXYGEN SATURATION: 99 % | WEIGHT: 138.45 LBS | BODY MASS INDEX: 24.53 KG/M2 | SYSTOLIC BLOOD PRESSURE: 122 MMHG | TEMPERATURE: 97.1 F | DIASTOLIC BLOOD PRESSURE: 65 MMHG

## 2024-06-25 DIAGNOSIS — G89.29 OTHER CHRONIC PAIN: ICD-10-CM

## 2024-06-25 DIAGNOSIS — R20.0 NUMBNESS AND TINGLING OF BOTH LEGS: ICD-10-CM

## 2024-06-25 DIAGNOSIS — M53.3 SACROILIAC JOINT DYSFUNCTION OF BOTH SIDES: ICD-10-CM

## 2024-06-25 DIAGNOSIS — R20.2 NUMBNESS AND TINGLING OF BOTH LEGS: ICD-10-CM

## 2024-06-25 DIAGNOSIS — M51.36 LUMBAR DEGENERATIVE DISC DISEASE: ICD-10-CM

## 2024-06-25 DIAGNOSIS — G89.29 CHRONIC BILATERAL LOW BACK PAIN WITHOUT SCIATICA: ICD-10-CM

## 2024-06-25 DIAGNOSIS — M54.50 CHRONIC BILATERAL LOW BACK PAIN WITHOUT SCIATICA: ICD-10-CM

## 2024-06-25 RX ORDER — BUSPIRONE HYDROCHLORIDE 5 MG/1
TABLET ORAL
COMMUNITY
Start: 2024-06-14

## 2024-06-25 RX ORDER — GABAPENTIN 100 MG/1
CAPSULE ORAL
Qty: 90 CAPSULE | Refills: 3 | Status: SHIPPED | OUTPATIENT
Start: 2024-06-25 | End: 2024-07-30

## 2024-06-25 RX ORDER — HYDROCODONE BITARTRATE AND ACETAMINOPHEN 5; 325 MG/1; MG/1
TABLET ORAL
COMMUNITY
Start: 2024-06-02

## 2024-06-25 ASSESSMENT — PAIN SCALES - GENERAL: PAINLEVEL: 9=SEVERE PAIN

## 2024-06-25 ASSESSMENT — PATIENT HEALTH QUESTIONNAIRE - PHQ9: CLINICAL INTERPRETATION OF PHQ2 SCORE: 0

## 2024-06-25 ASSESSMENT — FIBROSIS 4 INDEX: FIB4 SCORE: 0.24

## 2024-06-25 NOTE — PROGRESS NOTES
New Patient Note    Interventional Pain and Spine  Physiatry (Physical Medicine and Rehabilitation)     Patient Name: Ngoc Morales  : 2000  Date of Service: 2024  PCP: GAVI Rutledge  Referring Provider: Jose Alfredo Theodore M.D.    Chief Complaint:   Chief Complaint   Patient presents with    New Patient     Low back pain, unspecified back pain       HPI  HISTORY FROM INITIAL VISIT (2024):  Ngoc Morales is a 23 y.o. female who presents today with low back pain that started 2 years ago. Fluctuates, with occasional flareups lasting 1-2 days. Feels sharp across the low back radiating to her hips and sometimes is associated with tingling in her lateral thighs and sometimes feet.  Pain is worse with prolonged sitting, prolonged walking. Also worse with bending while caring for her 2 year old. Interferes with ADLs.  She has a history of B-cell lymphoma in remission that was treated with chemotherapy including R-, R-COPADM2, R-CYM1, R-CYM2 per chart review.    Pain right now is 9/10 on the numeric pain scale. Her pain at its best-worse level during the course of the day is typically 7-9/10, respectively.  Pain worsens with sitting, standing, bending forward, bending backwards, side bending or twisting, walking upstairs, coughing, and sneezing and improves with laying down. Her pain interferes somewhat with ADLs.     The patient has completed a provider driven physical therapy program for this problem. Made the pain worse.    Patient has tried the following medications with varied success (current meds in bold):   Tylenol- no relief  Ibuprofen - no relief  Mobic - relief, but only lasts 2 hrs  [place] QHS - SE drowsiness.  Taking for wrist fracture for which she is wearing a cast.  Reports she also had a knee dislocation     Therapeutic modalities and interventional therapies to date include:  -No targeted injections     Psychological testing for pain as depression and pain  commonly coexist and need to both be treated.     Opioid Risk Score: 11      Interpretation of Opioid Risk Score   Score 0-3 = Low risk of abuse. Do UDS at least once per year.  Score 4-7 = Moderate risk of abuse. Do UDS 1-4 times per year.  Score 8+ = High risk of abuse. Refer to specialist.    PHQ      1/22/2022     8:00 AM 12/19/2023     9:00 AM 6/25/2024     1:40 PM   Depression Screen (PHQ-2/PHQ-9)   PHQ-2 Total Score 0     PHQ-2 Total Score  0 0       Interpretation of PHQ-9 Total Score   Score Severity   1-4 No Depression   5-9 Mild Depression   10-14 Moderate Depression   15-19 Moderately Severe Depression   20-27 Severe Depression      Medical records review:  I reviewed the note from the referring provider Jose Alfredo Theodore M.D.    ROS:   Red Flags ROS:   Fever, Chills, Sweats: Denies  Involuntary Weight Loss: Denies  Bladder Incontinence: Denies  Bowel Incontinence: denies  Saddle Anesthesia: Denies    All other systems reviewed and negative.     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/3/2019    Psychiatric problem     depression, anxiety    Urinary tract infection 5/3/2019       PSHx:   Past Surgical History:   Procedure Laterality Date    CATH REMOVAL  9/21/2017    Procedure: CATH REMOVAL- PORT;  Surgeon: Yoli Grullon M.D.;  Location: SURGERY [facility];  Service: General    GASTROSCOPY-ENDO  4/28/2017    Procedure: GASTROSCOPY-ENDO;  Surgeon: Everette Jett M.D.;  Location: ENDOSCOPY [facility];  Service:     CATH PLACEMENT Left 4/13/2017    Procedure: PORT PLACEMENT  ;  Surgeon: Yoli Grullon M.D.;  Location: SURGERY [facility];  Service:     BIOPSY GENERAL N/A 4/13/2017    Procedure: BONE MARROW BIOPSY AND ASPIRATION; LUMBAR PUNCTURE  ;  Surgeon: Yoli Grullon M.D.;  Location: SURGERY [facility];  Service:        Family Hx:   No  family history on file.    Social Hx:  Social History     Socioeconomic History    Marital status: Single     Spouse name: Not on file    Number of children: Not on file    Years of education: Not on file    Highest education level: Not on file   Occupational History    Not on file   Tobacco Use    Smoking status: Never    Smokeless tobacco: Former    Tobacco comments:     Vapes nicotine   Vaping Use    Vaping status: Every Day    Substances: THC   Substance and Sexual Activity    Alcohol use: Yes     Alcohol/week: 2.4 oz     Types: 2 Glasses of wine, 2 Cans of beer per week    Drug use: Yes     Comment: marijuana    Sexual activity: Not on file   Other Topics Concern    Not on file   Social History Narrative    Not on file     Social Determinants of Health     Financial Resource Strain: Not on file   Food Insecurity: Not on file   Transportation Needs: Not on file   Physical Activity: Not on file   Stress: Not on file   Social Connections: Not on file   Intimate Partner Violence: Low Risk  (6/2/2024)    Received from [facility]    History of Abuse     Have you ever been afraid of, threatened, neglected, or abused by someone?: No   Housing Stability: Not on file       Allergies:  Allergies   Allergen Reactions    Bactrim [Sulfamethoxazole W-Trimethoprim]      NAUSEA        Medications: reviewed on epic.   Outpatient Medications Marked as Taking for the 6/25/24 encounter (Office Visit) with Jenelle Nguyen M.D.   Medication Sig Dispense Refill    HYDROcodone-acetaminophen (NORCO) 5-325 MG Tab per tablet TAKE ONE TO TWO TABLETS BY MOUTH EVERY 6 HOURS AS NEEDED FOR UP TO 5 DAYS      busPIRone (BUSPAR) 5 MG tablet       gabapentin (NEURONTIN) 100 MG Cap Take 1 Capsule by mouth at bedtime as needed (pain) for 7 days, THEN 2 Capsules at bedtime as needed for 7 days, THEN 3 Capsules at bedtime as needed for up to 21 days. 90 Capsule 3    meloxicam (MOBIC) 15 MG tablet Take 1 Tablet by mouth every day. 30  Tablet 2    meloxicam (MOBIC) 7.5 MG Tab Take 2 Tablets by mouth every day. 60 Tablet 1    ALPRAZolam (XANAX) 0.25 MG Tab TAKE 1 TABLET BY MOUTH ONCE A DAY AS NEEDED FOR AXIETY      amphetamine-dextroamphetamine (ADDERALL) 10 MG Tab TAKE 1 TABLET BY MOUTH TWICE A DAY AM AND NOON      hydrOXYzine pamoate (VISTARIL) 25 MG Cap TAKE 1 CAPSULE BY MOUTH TWICE DAILY AS NEEDED FOR INSOMNIA OR ANXIETY      methylPREDNISolone Acetate (DEPO-MEDROL INJ) Inject  as directed.      lamotrigine (LAMICTAL) 200 MG tablet Take 200 mg by mouth every day.          Current Outpatient Medications on File Prior to Visit   Medication Sig Dispense Refill    HYDROcodone-acetaminophen (NORCO) 5-325 MG Tab per tablet TAKE ONE TO TWO TABLETS BY MOUTH EVERY 6 HOURS AS NEEDED FOR UP TO 5 DAYS      busPIRone (BUSPAR) 5 MG tablet       meloxicam (MOBIC) 15 MG tablet Take 1 Tablet by mouth every day. 30 Tablet 2    meloxicam (MOBIC) 7.5 MG Tab Take 2 Tablets by mouth every day. 60 Tablet 1    ALPRAZolam (XANAX) 0.25 MG Tab TAKE 1 TABLET BY MOUTH ONCE A DAY AS NEEDED FOR AXIETY      amphetamine-dextroamphetamine (ADDERALL) 10 MG Tab TAKE 1 TABLET BY MOUTH TWICE A DAY AM AND NOON      hydrOXYzine pamoate (VISTARIL) 25 MG Cap TAKE 1 CAPSULE BY MOUTH TWICE DAILY AS NEEDED FOR INSOMNIA OR ANXIETY      methylPREDNISolone Acetate (DEPO-MEDROL INJ) Inject  as directed.      lamotrigine (LAMICTAL) 200 MG tablet Take 200 mg by mouth every day.       No current facility-administered medications on file prior to visit.         EXAMINATION     Physical Exam:   /65 (BP Location: Right arm, Patient Position: Sitting, BP Cuff Size: Adult)   Pulse 86   Temp 36.2 °C (97.1 °F) (Temporal)   Wt 62.8 kg (138 lb 7.2 oz)   SpO2 99%     Constitutional:   Body Habitus: Body mass index is 24.53 kg/m².  Cooperation: Fully cooperates with exam  Appearance: Well-groomed, well-nourished.    Eyes: No scleral icterus to suggest severe liver disease, no proptosis to suggest  severe hyperthyroidism    ENT -no obvious auditory deficits, no noticeable facial droop     Skin -no rashes or lesions noted     Respiratory-  breathing comfortably on room air, no audible wheezing    Cardiovascular-distal extremities warm and well perfused.  No lower extremity edema is noted.     Gastrointestinal - no obvious abdominal masses, non-distended    Psychiatric- alert and oriented ×3. Normal affect.     Gait - normal gait, no use of ambulatory device, nonantalgic. Heel walking and toe walking intact.    Musculoskeletal and Neuro -       Thoracic/Lumbar Spine/Sacral Spine/Hips   Inspection: No evidence of atrophy in bilateral lower extremities throughout     Palpation:   Tenderness to palpation over the  bilateral glutes .    Lumbar spine /hip provocative exam maneuvers  Straight leg raise negative bilaterally  FADIR test negative bilaterally    SI joint tests  GALA test negative bilaterally  Thigh thrust test positive bilaterally  SI joint compression positive bilaterally  SI joint distraction positive bilaterally  Sacral thrust test positive bilaterally  Gaenslens maneuver positive bilaterally    Key points for the international standards for neurological classification of spinal cord injury (ISNCSCI) to light touch.   Dermatome R L   L2 2 2   L3 2 2   L4 2 2   L5 2 2   S1 2 2   S2 2 2       Motor Exam Lower Extremities  ? Myotome R L   Hip flexion L2 5 5   Knee extension L3 5 NT*   Ankle dorsiflexion L4 5 5   Toe extension L5 5 5   Ankle plantarflexion S1 5 5   *Not tested, left knee in brace after knee dislocation    Reflexes  ?  R L   Patella  2+ 2+   Achilles   2+ 2+     Clonus of the ankle negative bilaterally       MEDICAL DECISION MAKING    Medical records review: see under HPI section.     DATA    Labs: Personally reviewed at today's visit:     Lab Results   Component Value Date/Time    SODIUM 139 07/13/2023 02:40 PM    POTASSIUM 4.0 07/13/2023 02:40 PM    CHLORIDE 106 07/13/2023 02:40 PM     "CO2 20 07/13/2023 02:40 PM    ANION 13.0 07/13/2023 02:40 PM    GLUCOSE 91 07/13/2023 02:40 PM    BUN 19 07/13/2023 02:40 PM    CREATININE 0.92 07/13/2023 02:40 PM    CALCIUM 9.2 07/13/2023 02:40 PM    ASTSGOT 13 07/13/2023 02:40 PM    ALTSGPT 16 07/13/2023 02:40 PM    TBILIRUBIN 0.2 07/13/2023 02:40 PM    ALBUMIN 4.7 07/13/2023 02:40 PM    TOTPROTEIN 7.3 07/13/2023 02:40 PM    GLOBULIN 2.6 07/13/2023 02:40 PM    AGRATIO 1.8 07/13/2023 02:40 PM       Lab Results   Component Value Date/Time    PROTHROMBTM 13.0 04/28/2017 01:19 AM    INR 0.95 04/28/2017 01:19 AM        Lab Results   Component Value Date/Time    WBC 8.1 07/13/2023 02:40 PM    RBC 4.67 07/13/2023 02:40 PM    HEMOGLOBIN 13.4 07/13/2023 02:40 PM    HEMATOCRIT 41.4 07/13/2023 02:40 PM    MCV 88.7 07/13/2023 02:40 PM    MCH 28.7 07/13/2023 02:40 PM    MCHC 32.4 07/13/2023 02:40 PM    MPV 9.7 07/13/2023 02:40 PM    NEUTSPOLYS 56.20 07/13/2023 02:40 PM    LYMPHOCYTES 37.50 07/13/2023 02:40 PM    MONOCYTES 5.30 07/13/2023 02:40 PM    EOSINOPHILS 0.60 07/13/2023 02:40 PM    BASOPHILS 0.20 07/13/2023 02:40 PM    HYPOCHROMIA 1+ 06/06/2017 04:40 AM    ANISOCYTOSIS 2+ 06/29/2017 04:00 AM        No results found for: \"HBA1C\"     Imaging:   I personally reviewed following images, these are my reads    MRI lumbar spine 5/1/2019  Mild disc bulges at L4-5 and L5-S1.  No evidence of nerve impingement.  See formal radiology report for further details.    X-ray lumbar spine 12/19/2023  Mild degenerative disc disease at L4-5 and L5-S1.  Alignment intact. See formal radiology report for further details.      IMAGING radiology reads. I reviewed the following radiology reads                        Results for orders placed during the hospital encounter of 05/01/19    MR-LUMBAR SPINE-WITH & W/O    Impression  1.  Mild posterior disc bulging again seen at the L4-5 and L5-S1 levels, without evidence for neural compression.  2.  No lumbar spine fracture or segmental " malalignment.  3.  No abnormal enhancement within the lumbar spine or spinal canal.    These findings were discussed with FRAN AKHTAR by Dr. Alvarado on the morning of 2019.       Results for orders placed during the hospital encounter of 19    MR-THORACIC SPINE-WITH & W/O    Impression  1.  Multilevel mild disc bulging, without significant neural compression.  2.  No focal marrow edema to indicate fracture.  3.  No focal abnormal marrow signal to indicate tumor.  4.  No abnormal enhancement within the thoracic spine or spinal canal.    These findings were discussed with FRAN AKHTAR by Dr. Alvarado on the morning of 2019.                                         Results for orders placed during the hospital encounter of 18    DX-CHEST-2 VIEWS    Impression  No acute cardiopulmonary disease.                    Results for orders placed during the hospital encounter of 17    DX-KNEES-AP BILATERAL STANDING    Impression  Slight varus angulation of the right knee joint.    Results for orders placed during the hospital encounter of 23    DX-LUMBAR SPINE-2 OR 3 VIEWS    Impression  Mild degenerative disc disease at L4-5 and L5-S1.  No malalignment.    Results for orders placed during the hospital encounter of 17    DX-PELVIS-1 OR 2 VIEWS    Impression  No acute fracture identified. If symptoms are persistent, MRI would be recommended for further evaluation.                Results for orders placed in visit on 24    DX-WRIST-COMPLETE 3+ LEFT   Results for orders placed in visit on 24    DX-WRIST-LIMITED 2- LEFT        Diagnosis  Visit Diagnoses     ICD-10-CM   1. Chronic bilateral low back pain without sciatica  M54.50    G89.29   2. Numbness and tingling of both legs  R20.0    R20.2   3. Other chronic pain  G89.29   4. Lumbar degenerative disc disease  M51.36   5. Sacroiliac joint dysfunction of both sides  M53.3         ASSESSMENT AND PLAN:  Ngoc SAMUEL  2000) is a female presenting with severe pain at her bilateral axial glutes, reproduced with positive provocative exam testing for sacroiliac joint dysfunction on both sides.  She also reports numbness and tingling primarily following the bilateral L5 dermatomal distribution.  She does not have a recent MRI lumbar spine available for my review at the time of today's visit.  She does have a history of B-cell lymphoma that was treated with chemotherapeutic agents including vincristine.  Differential of numbness/tingling includes lumbar radiculitis or chemotherapy-induced neuropathy.     Ngoc was seen today for new patient.    Diagnoses and all orders for this visit:    Chronic bilateral low back pain without sciatica  -     MR-LUMBAR SPINE-W/O; Future    Numbness and tingling of both legs  -     MR-LUMBAR SPINE-W/O; Future    Other chronic pain    Lumbar degenerative disc disease  -     MR-LUMBAR SPINE-W/O; Future    Sacroiliac joint dysfunction of both sides  -     MR-PELVIS W/O; Future    Other orders  -     gabapentin (NEURONTIN) 100 MG Cap; Take 1 Capsule by mouth at bedtime as needed (pain) for 7 days, THEN 2 Capsules at bedtime as needed for 7 days, THEN 3 Capsules at bedtime as needed for up to 21 days.          PLAN  Physical Therapy: I ordered physical therapy to focus on strengthening and stretching as well as a home exercise program.     Diagnostic workup: as above and Personally reviewed at today's visit: X-ray lumbar spine 12/19/2023 and MRI lumbar spine 5/1/2019    Medications:   - given the neuropathic component of pain, I will start gabapentin as above   -Okay to continue narcotics for wrist fracture.  Discussed that I would not recommend narcotics for chronic pain at this time.    -Okay to continue meloxicam    Interventions:   -Discussed that if corresponding nerve impingement seen on the patient's MRI lumbar spine, would consider epidural steroid injection.  -Discussed bilateral sacroiliac  joint injections under fluoroscopic guidance.  Defer for now in favor of receiving MRI first    Other   - Consider EMG/NCS if no corresponding nerve impingement seen on the patient's MRI lumbar spine.    Follow-up: After MRI    Orders Placed This Encounter    MR-LUMBAR SPINE-W/O    MR-PELVIS W/O    HYDROcodone-acetaminophen (NORCO) 5-325 MG Tab per tablet    busPIRone (BUSPAR) 5 MG tablet    gabapentin (NEURONTIN) 100 MG Cap       Jenelle Nguyen MD  Interventional Pain and Spine  Physical Medicine and Rehabilitation  [facility]    Jose Alfredo Piper M.D.     The above note documents my personal evaluation of this patient. In addition, I have reviewed and confirmed with the patient and MA the supportive information documented in today's Patient Health Questionnaire and Office Note.     Please note that this dictation was created using voice recognition software. I have made every reasonable attempt to correct obvious errors, but I expect that there are errors of grammar and possibly content that I did not discover before finalizing the note.

## 2024-07-19 ENCOUNTER — TELEPHONE (OUTPATIENT)
Dept: PEDIATRIC HEMATOLOGY/ONCOLOGY | Facility: MEDICAL CENTER | Age: 24
End: 2024-07-19
Payer: COMMERCIAL

## 2024-08-19 ENCOUNTER — APPOINTMENT (OUTPATIENT)
Dept: RADIOLOGY | Facility: MEDICAL CENTER | Age: 24
End: 2024-08-19
Attending: STUDENT IN AN ORGANIZED HEALTH CARE EDUCATION/TRAINING PROGRAM
Payer: COMMERCIAL

## 2024-08-19 ENCOUNTER — APPOINTMENT (OUTPATIENT)
Dept: RADIOLOGY | Facility: MEDICAL CENTER | Age: 24
End: 2024-08-19
Attending: ORTHOPAEDIC SURGERY
Payer: COMMERCIAL

## 2024-08-19 DIAGNOSIS — R20.0 NUMBNESS AND TINGLING OF BOTH LEGS: ICD-10-CM

## 2024-08-19 DIAGNOSIS — G89.29 CHRONIC BILATERAL LOW BACK PAIN WITHOUT SCIATICA: ICD-10-CM

## 2024-08-19 DIAGNOSIS — M53.3 SACROILIAC JOINT DYSFUNCTION OF BOTH SIDES: ICD-10-CM

## 2024-08-19 DIAGNOSIS — R20.2 NUMBNESS AND TINGLING OF BOTH LEGS: ICD-10-CM

## 2024-08-19 DIAGNOSIS — S83.005A DISLOCATION OF LEFT PATELLA, INITIAL ENCOUNTER: ICD-10-CM

## 2024-08-19 DIAGNOSIS — M51.36 LUMBAR DEGENERATIVE DISC DISEASE: ICD-10-CM

## 2024-08-19 DIAGNOSIS — M54.50 CHRONIC BILATERAL LOW BACK PAIN WITHOUT SCIATICA: ICD-10-CM

## 2024-08-19 PROCEDURE — 73721 MRI JNT OF LWR EXTRE W/O DYE: CPT

## 2024-08-19 PROCEDURE — 72148 MRI LUMBAR SPINE W/O DYE: CPT

## 2024-08-19 PROCEDURE — 72195 MRI PELVIS W/O DYE: CPT

## 2024-10-15 ENCOUNTER — OFFICE VISIT (OUTPATIENT)
Dept: PHYSICAL MEDICINE AND REHAB | Facility: MEDICAL CENTER | Age: 24
End: 2024-10-15
Payer: COMMERCIAL

## 2024-10-15 DIAGNOSIS — Z91.199 NO-SHOW FOR APPOINTMENT: ICD-10-CM

## 2024-10-15 PROCEDURE — 99999 PR NO CHARGE: CPT | Performed by: STUDENT IN AN ORGANIZED HEALTH CARE EDUCATION/TRAINING PROGRAM

## 2024-10-15 NOTE — MR AVS SNAPSHOT
"        Ngoc Schmidtsalome   2017 2:00 PM   Office Visit   MRN: [id number]    Department:  Peds Mg Hem/Onc   Dept Phone:  [phone]    Description:  Female : 2000   Provider:  JoseA lfredo Theodore M.D.           Reason for Visit     Follow-Up           Allergies as of 2017     No Known Allergies      You were diagnosed with     DLBCL (diffuse large B cell lymphoma) (CMS-HCC)   [711331]         Vital Signs     Blood Pressure Pulse Temperature Respirations Height Weight    120/61 mmHg 83 36.5 °C (97.7 °F) 20 1.6 m (5' 2.99\") 53.5 kg (117 lb 15.1 oz)    Body Mass Index Oxygen Saturation Smoking Status             20.90 kg/m2 99% Current Every Day Smoker         Basic Information     Date Of Birth Sex Race Ethnicity Preferred Language    2000 Female White Non- English      Your appointments     Sep 21, 2017  3:00 PM   ONCOLOGY EST PATIENT 30 MIN with Jose Alfredo Theodore M.D.   [facility] (--)    [address]  [facility] [id number] [phone]              Problem List              ICD-10-CM Priority Class Noted - Resolved    Diffuse large B-cell lymphoma (CMS-HCC) C83.30   2017 - Present      Health Maintenance        Date Due Completion Dates    IMM HEP B VACCINE (1 of 3 - Primary Series) 2000 ---    IMM INACTIVATED POLIO VACCINE <19 YO (1 of 4 - All IPV Series) 2001 ---    IMM HEP A VACCINE (1 of 2 - Standard Series) 2001 ---    IMM PNEUMOCOCCAL PCV13 HIGH RISK (1 - PCV13 Required) 2001 ---    IMM DTaP/Tdap/Td Vaccine (1 - Tdap) 2007 ---    IMM HPV VACCINE (1 of 3 - Female 3 Dose Series) 2011 ---    IMM VARICELLA (CHICKENPOX) VACCINE (1 of 2 - 2 Dose Adolescent Series) 2013 ---    IMM MENINGOCOCCAL VACCINE (MCV4) (1 of 1) 2016 ---    IMM INFLUENZA (1) 2017 ---            Current Immunizations     No immunizations on file.      Below and/or attached are the medications your provider expects you " to take. Review all of your home medications and newly ordered medications with your provider and/or pharmacist. Follow medication instructions as directed by your provider and/or pharmacist. Please keep your medication list with you and share with your provider. Update the information when medications are discontinued, doses are changed, or new medications (including over-the-counter products) are added; and carry medication information at all times in the event of emergency situations     Allergies:  No Known Allergies          Medications  Valid as of: August 24, 2017 -  3:13 PM    Generic Name Brand Name Tablet Size Instructions for use    LamoTRIgine (Tab) LAMICTAL 100 MG Take 200 mg by mouth every day.        Lidocaine-Prilocaine (Cream) EMLA 2.5-2.5 % Apply 1 Application to affected area(s) as needed (For port access).        Sulfamethoxazole-Trimethoprim (Tab) BACTRIM -160 MG Take 1 Tab by mouth BID 2 DAYS A WEEK.        .                 Medicines prescribed today were sent to:     [facility]/PHARMACY #[id number] [address] [address]    Phone: [phone] Fax: [phone]    Open 24 Hours?: No      Medication refill instructions:       If your prescription bottle indicates you have medication refills left, it is not necessary to call your provider’s office. Please contact your pharmacy and they will refill your medication.    If your prescription bottle indicates you do not have any refills left, you may request refills at any time through one of the following ways: The online [employer] system (except Urgent Care), by calling your provider’s office, or by asking your pharmacy to contact your provider’s office with a refill request. Medication refills are processed only during regular business hours and may not be available until the next business day. Your provider may request additional information or to have a follow-up visit with you prior to  refilling your medication.   *Please Note: Medication refills are assigned a new Rx number when refilled electronically. Your pharmacy may indicate that no refills were authorized even though a new prescription for the same medication is available at the pharmacy. Please request the medicine by name with the pharmacy before contacting your provider for a refill.        Your To Do List     Future Labs/Procedures Complete By Expires    CBC WITH DIFFERENTIAL  As directed 8/24/2018    COMP METABOLIC PANEL  As directed 8/24/2018    IMMUNOGLOBULINS A/G/M SERUM  As directed 8/24/2018    LDH  As directed 8/24/2018    LYMPHOCYTE SUBSET PANEL 5-TOTAL LYMPHOCYTE  As directed 8/24/2018         [employer] Access Code: [email or web address]  Expires: 9/23/2017  3:13 PM          Quit Tobacco Information     Do you want to quit using tobacco?    Quitting tobacco decreases risks of cancer, heart and lung disease, increases life expectancy, improves sense of taste and smell, and increases spending money, among other benefits.    If you are thinking about quitting, we can help.  • [employer] Quit Tobacco Program: 601.198.9200  o Program occurs weekly for four weeks and includes pharmacist consultation on products to support quitting smoking or chewing tobacco. A provider referral is needed for pharmacist consultation.  • Tobacco Users Help Hotline: 1-648-QUIT-NOW (990-2762) or https://[place].quitlogix.org/  o Free, confidential telephone and online coaching for [place] residents. Sessions are designed on a schedule that is convenient for you. Eligible clients receive free nicotine replacement therapy.  • Nationally: www.smokefree.gov  o Information and professional assistance to support both immediate and long-term needs as you become, and remain, a non-smoker. Smokefree.gov allows you to choose the help that best fits your needs.         15-Oct-2024

## 2024-10-22 ENCOUNTER — OFFICE VISIT (OUTPATIENT)
Dept: PHYSICAL MEDICINE AND REHAB | Facility: MEDICAL CENTER | Age: 24
End: 2024-10-22
Payer: COMMERCIAL

## 2024-10-22 ENCOUNTER — HOSPITAL ENCOUNTER (OUTPATIENT)
Facility: MEDICAL CENTER | Age: 24
End: 2024-10-22
Attending: UROLOGY
Payer: COMMERCIAL

## 2024-10-22 VITALS
DIASTOLIC BLOOD PRESSURE: 77 MMHG | SYSTOLIC BLOOD PRESSURE: 117 MMHG | HEART RATE: 85 BPM | BODY MASS INDEX: 24.49 KG/M2 | WEIGHT: 138.23 LBS | OXYGEN SATURATION: 100 % | TEMPERATURE: 97.2 F | HEIGHT: 63 IN

## 2024-10-22 DIAGNOSIS — Z91.81 RISK FOR FALLS: ICD-10-CM

## 2024-10-22 DIAGNOSIS — G89.29 CHRONIC BILATERAL LOW BACK PAIN WITHOUT SCIATICA: ICD-10-CM

## 2024-10-22 DIAGNOSIS — M54.50 CHRONIC BILATERAL LOW BACK PAIN WITHOUT SCIATICA: ICD-10-CM

## 2024-10-22 DIAGNOSIS — R20.2 NUMBNESS AND TINGLING OF BOTH LEGS: ICD-10-CM

## 2024-10-22 DIAGNOSIS — R20.0 NUMBNESS AND TINGLING OF BOTH LEGS: ICD-10-CM

## 2024-10-22 LAB
APPEARANCE UR: CLEAR
BACTERIA #/AREA URNS HPF: NEGATIVE /HPF
BILIRUB UR QL STRIP.AUTO: NEGATIVE
COLOR UR: YELLOW
EPI CELLS #/AREA URNS HPF: ABNORMAL /HPF
GLUCOSE UR STRIP.AUTO-MCNC: NEGATIVE MG/DL
HYALINE CASTS #/AREA URNS LPF: ABNORMAL /LPF
KETONES UR STRIP.AUTO-MCNC: NEGATIVE MG/DL
LEUKOCYTE ESTERASE UR QL STRIP.AUTO: ABNORMAL
MICRO URNS: ABNORMAL
NITRITE UR QL STRIP.AUTO: NEGATIVE
PH UR STRIP.AUTO: 6 [PH] (ref 5–8)
PROT UR QL STRIP: NEGATIVE MG/DL
RBC # URNS HPF: ABNORMAL /HPF
RBC UR QL AUTO: ABNORMAL
RENAL EPI CELLS #/AREA URNS HPF: ABNORMAL /HPF
SP GR UR STRIP.AUTO: 1.03
UROBILINOGEN UR STRIP.AUTO-MCNC: 0.2 MG/DL
WBC #/AREA URNS HPF: ABNORMAL /HPF

## 2024-10-22 PROCEDURE — 81001 URINALYSIS AUTO W/SCOPE: CPT

## 2024-10-22 PROCEDURE — 3074F SYST BP LT 130 MM HG: CPT | Performed by: STUDENT IN AN ORGANIZED HEALTH CARE EDUCATION/TRAINING PROGRAM

## 2024-10-22 PROCEDURE — G2211 COMPLEX E/M VISIT ADD ON: HCPCS | Performed by: STUDENT IN AN ORGANIZED HEALTH CARE EDUCATION/TRAINING PROGRAM

## 2024-10-22 PROCEDURE — 1125F AMNT PAIN NOTED PAIN PRSNT: CPT | Performed by: STUDENT IN AN ORGANIZED HEALTH CARE EDUCATION/TRAINING PROGRAM

## 2024-10-22 PROCEDURE — 99214 OFFICE O/P EST MOD 30 MIN: CPT | Performed by: STUDENT IN AN ORGANIZED HEALTH CARE EDUCATION/TRAINING PROGRAM

## 2024-10-22 PROCEDURE — 3078F DIAST BP <80 MM HG: CPT | Performed by: STUDENT IN AN ORGANIZED HEALTH CARE EDUCATION/TRAINING PROGRAM

## 2024-10-22 RX ORDER — GABAPENTIN 100 MG/1
CAPSULE ORAL
Qty: 90 CAPSULE | Refills: 2 | Status: SHIPPED | OUTPATIENT
Start: 2024-10-22 | End: 2024-11-12

## 2024-10-22 RX ORDER — METHOCARBAMOL 750 MG/1
750 TABLET, FILM COATED ORAL 2 TIMES DAILY PRN
COMMUNITY
Start: 2024-08-30

## 2024-10-22 ASSESSMENT — PAIN SCALES - GENERAL: PAINLEVEL: 8=MODERATE-SEVERE PAIN

## 2024-10-22 ASSESSMENT — PATIENT HEALTH QUESTIONNAIRE - PHQ9: CLINICAL INTERPRETATION OF PHQ2 SCORE: 0

## 2024-10-22 ASSESSMENT — FIBROSIS 4 INDEX: FIB4 SCORE: 0.24

## 2024-11-08 ENCOUNTER — HOSPITAL ENCOUNTER (OUTPATIENT)
Dept: PEDIATRIC HEMATOLOGY/ONCOLOGY | Facility: MEDICAL CENTER | Age: 24
End: 2024-11-08
Attending: PEDIATRICS
Payer: COMMERCIAL

## 2024-11-08 ENCOUNTER — HOSPITAL ENCOUNTER (OUTPATIENT)
Facility: MEDICAL CENTER | Age: 24
End: 2024-11-08
Attending: PEDIATRICS
Payer: COMMERCIAL

## 2024-11-08 VITALS
HEIGHT: 63 IN | DIASTOLIC BLOOD PRESSURE: 68 MMHG | WEIGHT: 136.69 LBS | SYSTOLIC BLOOD PRESSURE: 127 MMHG | BODY MASS INDEX: 24.22 KG/M2 | TEMPERATURE: 97.7 F | HEART RATE: 99 BPM | OXYGEN SATURATION: 100 %

## 2024-11-08 DIAGNOSIS — Z85.72 HISTORY OF B-CELL LYMPHOMA: ICD-10-CM

## 2024-11-08 DIAGNOSIS — Z08 ROUTINE CANCER FOLLOW-UP VISIT: ICD-10-CM

## 2024-11-08 LAB
25(OH)D3 SERPL-MCNC: 22 NG/ML (ref 30–100)
ALBUMIN SERPL BCP-MCNC: 4.6 G/DL (ref 3.2–4.9)
ALBUMIN/GLOB SERPL: 1.4 G/DL
ALP SERPL-CCNC: 102 U/L (ref 30–99)
ALT SERPL-CCNC: 25 U/L (ref 2–50)
ANION GAP SERPL CALC-SCNC: 12 MMOL/L (ref 7–16)
AST SERPL-CCNC: 18 U/L (ref 12–45)
BASOPHILS # BLD AUTO: 0.3 % (ref 0–1.8)
BASOPHILS # BLD: 0.02 K/UL (ref 0–0.12)
BILIRUB SERPL-MCNC: 0.3 MG/DL (ref 0.1–1.5)
BUN SERPL-MCNC: 16 MG/DL (ref 8–22)
CALCIUM ALBUM COR SERPL-MCNC: 9.2 MG/DL (ref 8.5–10.5)
CALCIUM SERPL-MCNC: 9.7 MG/DL (ref 8.5–10.5)
CHLORIDE SERPL-SCNC: 104 MMOL/L (ref 96–112)
CO2 SERPL-SCNC: 21 MMOL/L (ref 20–33)
CREAT SERPL-MCNC: 0.64 MG/DL (ref 0.5–1.4)
EOSINOPHIL # BLD AUTO: 0.08 K/UL (ref 0–0.51)
EOSINOPHIL NFR BLD: 1 % (ref 0–6.9)
ERYTHROCYTE [DISTWIDTH] IN BLOOD BY AUTOMATED COUNT: 43.8 FL (ref 35.9–50)
FERRITIN SERPL-MCNC: 73.9 NG/ML (ref 10–291)
GFR SERPLBLD CREATININE-BSD FMLA CKD-EPI: 127 ML/MIN/1.73 M 2
GLOBULIN SER CALC-MCNC: 3.3 G/DL (ref 1.9–3.5)
GLUCOSE SERPL-MCNC: 88 MG/DL (ref 65–99)
HCT VFR BLD AUTO: 42.9 % (ref 37–47)
HGB BLD-MCNC: 14.4 G/DL (ref 12–16)
IMM GRANULOCYTES # BLD AUTO: 0.05 K/UL (ref 0–0.11)
IMM GRANULOCYTES NFR BLD AUTO: 0.7 % (ref 0–0.9)
LDH SERPL L TO P-CCNC: 192 U/L (ref 107–266)
LYMPHOCYTES # BLD AUTO: 3.63 K/UL (ref 1–4.8)
LYMPHOCYTES NFR BLD: 47.5 % (ref 22–41)
MAGNESIUM SERPL-MCNC: 2.3 MG/DL (ref 1.5–2.5)
MCH RBC QN AUTO: 30.3 PG (ref 27–33)
MCHC RBC AUTO-ENTMCNC: 33.6 G/DL (ref 32.2–35.5)
MCV RBC AUTO: 90.3 FL (ref 81.4–97.8)
MONOCYTES # BLD AUTO: 0.45 K/UL (ref 0–0.85)
MONOCYTES NFR BLD AUTO: 5.9 % (ref 0–13.4)
NEUTROPHILS # BLD AUTO: 3.41 K/UL (ref 1.82–7.42)
NEUTROPHILS NFR BLD: 44.6 % (ref 44–72)
NRBC # BLD AUTO: 0 K/UL
NRBC BLD-RTO: 0 /100 WBC (ref 0–0.2)
PHOSPHATE SERPL-MCNC: 3 MG/DL (ref 2.5–4.5)
PLATELET # BLD AUTO: 377 K/UL (ref 164–446)
PMV BLD AUTO: 9.2 FL (ref 9–12.9)
POTASSIUM SERPL-SCNC: 3.9 MMOL/L (ref 3.6–5.5)
PROT SERPL-MCNC: 7.9 G/DL (ref 6–8.2)
RBC # BLD AUTO: 4.75 M/UL (ref 4.2–5.4)
SODIUM SERPL-SCNC: 137 MMOL/L (ref 135–145)
WBC # BLD AUTO: 7.6 K/UL (ref 4.8–10.8)

## 2024-11-08 PROCEDURE — 82166 ASSAY ANTI-MULLERIAN HORM: CPT

## 2024-11-08 PROCEDURE — 82728 ASSAY OF FERRITIN: CPT

## 2024-11-08 PROCEDURE — 83615 LACTATE (LD) (LDH) ENZYME: CPT

## 2024-11-08 PROCEDURE — 99213 OFFICE O/P EST LOW 20 MIN: CPT | Performed by: PEDIATRICS

## 2024-11-08 PROCEDURE — 80053 COMPREHEN METABOLIC PANEL: CPT

## 2024-11-08 PROCEDURE — 82306 VITAMIN D 25 HYDROXY: CPT

## 2024-11-08 PROCEDURE — 36415 COLL VENOUS BLD VENIPUNCTURE: CPT

## 2024-11-08 PROCEDURE — 84100 ASSAY OF PHOSPHORUS: CPT

## 2024-11-08 PROCEDURE — 83735 ASSAY OF MAGNESIUM: CPT

## 2024-11-08 PROCEDURE — 85025 COMPLETE CBC W/AUTO DIFF WBC: CPT

## 2024-11-08 RX ORDER — TRAMADOL HYDROCHLORIDE 50 MG/1
50 TABLET ORAL EVERY 4 HOURS PRN
COMMUNITY

## 2024-11-08 RX ORDER — ACYCLOVIR 400 MG/1
400 TABLET ORAL 2 TIMES DAILY
COMMUNITY

## 2024-11-08 ASSESSMENT — FIBROSIS 4 INDEX: FIB4 SCORE: 0.24

## 2024-11-08 NOTE — PROGRESS NOTES
Patient to [facility] for labs and office visit. Labs drawn from [facility] with one attempt. Patient tolerated well. No other needs at this time.

## 2024-11-12 LAB — MIS SERPL-MCNC: 1.17 NG/ML (ref 0.4–16.02)

## 2024-12-04 ENCOUNTER — NON-PROVIDER VISIT (OUTPATIENT)
Dept: NEUROLOGY | Facility: MEDICAL CENTER | Age: 24
End: 2024-12-04
Attending: STUDENT IN AN ORGANIZED HEALTH CARE EDUCATION/TRAINING PROGRAM
Payer: COMMERCIAL

## 2024-12-04 DIAGNOSIS — R20.0 NUMBNESS AND TINGLING OF BOTH LEGS: ICD-10-CM

## 2024-12-04 DIAGNOSIS — R20.2 NUMBNESS AND TINGLING OF BOTH LEGS: ICD-10-CM

## 2024-12-04 PROCEDURE — 95886 MUSC TEST DONE W/N TEST COMP: CPT | Performed by: STUDENT IN AN ORGANIZED HEALTH CARE EDUCATION/TRAINING PROGRAM

## 2024-12-04 PROCEDURE — 95912 NRV CNDJ TEST 11-12 STUDIES: CPT | Mod: 26 | Performed by: STUDENT IN AN ORGANIZED HEALTH CARE EDUCATION/TRAINING PROGRAM

## 2024-12-04 PROCEDURE — 95886 MUSC TEST DONE W/N TEST COMP: CPT | Mod: 26 | Performed by: STUDENT IN AN ORGANIZED HEALTH CARE EDUCATION/TRAINING PROGRAM

## 2024-12-04 PROCEDURE — 95910 NRV CNDJ TEST 7-8 STUDIES: CPT | Performed by: STUDENT IN AN ORGANIZED HEALTH CARE EDUCATION/TRAINING PROGRAM

## 2024-12-05 NOTE — PROCEDURES
"[facility]  Neurodiagnostic Laboratory   [address] [address]  Tel: [phone]  Fax: [phone]        Name: Ngoc Morales Gender: female  ID: [id number] YOB: 2000    Visit Date: 12/04/24  Age: 23 y.o.  Examining Physician: Shreya Chambers MD.   Referring Physician:  Jenelle Nguyen MD.   Height:  5'3\"  Weight: 136 lbs.     Reason for Referral: The patient is a 23 y.o. woman who presents with chronic back pain and paresthesias in lower extremities.  This examination was requested to evaluate for polyneuropathy and/or lumbosacral radiculopathy.       Summary:    Sensory nerve conduction studies reveal normal bilateral sural sensory nerve action potential (SNAP) amplitudes and conduction velocities.     Motor nerve conduction studies reveal normal bilateral peroneal and bilateral tibial distal motor latencies, compound muscle action potential (CMAP) amplitudes/durations, and conduction velocities.     F-wave studies reveal normal bilateral tibial, and bilateral peroneal F-wave minimal latencies.     The bilateral tibial H-reflex responses are normal and symmetrical.     Concentric needle electromyography of selected muscles in the bilateral lower extremities is normal. Motor unit potential (MUP) durations, amplitudes, and morphology are normal. Interference patterns are full throughout. There is no abnormal spontaneous activity.     Electrophysiologic Impression:This is a normal study of the bilateral lower extremities. There is no electrophysiologic evidence for large fiber polyneuropathy or lumbosacral radiculopathy.     _____________________________  Shreya Chambers MD  Diplomate, American Board of Psychiatry and Neurology  Clinical Neurophysiology  [facility], Department of Neurology     I have personally reviewed and interpreted this study.    Data tables:      Nerve Conduction Studies     Stim Site NR Onset (ms) Norm Onset (ms) O-P " Amp (µV) Norm O-P Amp Site1 Site2 Delta-P (ms) Dist (cm) Ramiro (m/s) Norm Ramiro (m/s)   Left Sural Anti Sensory (Lat Mall)   Calf    2.1 <4.4 28.3 >6 Calf Lat Mall 3.1 14.0 45 >40   Right Sural Anti Sensory (Lat Mall)   Calf    2.6 <4.4 26.9 >6 Calf Lat Mall 3.4 14.0 41 >40        Stim Site NR Onset (ms) Norm Onset (ms) O-P Amp (mV) Norm O-P Amp Site1 Site2 Delta-0 (ms) Dist (cm) Ramiro (m/s) Norm Ramiro (m/s)   Left Peroneal EDB Motor (Ext Dig Brev)   Ankle    4.1 <5.5 3.6 >3.0 B Fib Ankle 7.6 32.0 42 >40   B Fib    11.7  2.5  Poplt B Fib 1.4 10.0 71 >40   Poplt    13.1  1.9          Right Peroneal EDB Motor (Ext Dig Brev)   Ankle    4.9 <5.5 7.2 >3.0 B Fib Ankle 6.4 31.0 48 >40   B Fib    11.3  6.9  Poplt B Fib 1.4 10.0 71 >40   Poplt    12.7  5.3          Left Tibial Motor (Abd Mcmillan Brev)   Ankle    5.3 <5.8 18.9 >8 Knee Ankle 7.0 39.0 56 >40   Knee    12.3  17.3          Right Tibial Motor (Abd Mcmillan Brev)   Ankle    4.0 <5.8 24.1 >8 Knee Ankle 7.3 38.0 52 >40   Knee    11.3  17.2            F Wave Studies     NR F-Lat (ms) Lat Norm (ms)   Left Peroneal EDB (Ext Dig Brev)      43.89 <57   Right Peroneal EDB (Ext Dig Brev)      43.31 <57   Left Tibial (Abd Hallucis)      44.86 <57   Right Tibial (Abd Hallucis)      46.10 <57     H Reflex Studies     NR H-Lat (ms) L-R H-Lat (ms) L-R Lat Norm   Left Tibial (Gastroc)      26.56 1.14 <2.0   Right Tibial (Gastroc)      25.43 1.14 <2.0                                        Electromyography     Side Muscle Nerve Root Ins Act Fibs Psw Amp Dur Poly Recrt Int Pat Comment   Left AntTibialis Dp Br Fibular L4-5 Nml Nml Nml Nml Nml 0 Nml Nml    Left Gastroc Tibial S1-2 Nml Nml Nml Nml Nml 0 Nml Nml    Left VastusLat Femoral L2-4 Nml Nml Nml Nml Nml 0 Nml Nml    Left VastusMed Femoral L2-4 Nml Nml Nml Nml Nml 0 Nml Nml    Left Fibularis Long Sup Br Fibular L5-S1 Nml Nml Nml Nml Nml 0 Nml Nml    Right AntTibialis Dp Br Fibular L4-5 Nml Nml Nml Nml Nml 0 Nml Nml    Right Gastroc Tibial  S1-2 Nml Nml Nml Nml Nml 0 Nml Nml    Right VastusLat Femoral L2-4 Nml Nml Nml Nml Nml 0 Nml Nml    Right VastusMed Femoral L2-4 Nml Nml Nml Nml Nml 0 Nml Nml    Right Fibularis Long Sup Br Fibular L5-S1 Nml Nml Nml Nml Nml 0 Nml Nml

## 2024-12-08 NOTE — PROGRESS NOTES
Pediatric Hematology/Oncology Clinic  Progress Note      Patient Name:  Ngoc Morales  : 2000   MRN: [id number]    Location of Service: [facility]    Date of Service: 2024  Time: 1:30 PM    Primary Care Physician: Pcp Pt States None    HISTORY OF PRESENT ILLNESS:     Chief Complaint: Follow-up of Diffuse Large B-Cell Lymphoma - now 89 months off therapy    History of Present Illness: Ngoc Morales is a 23 y.o. female who returns to the [facility] for follow-up of her history of Diffuse Large B-Cell Lymphoma, now 89 months off therapy.  She presents to clinic with her mother and toddler son. Ngoc provides accurate interval and clinical history.     Briefly, Ngoc is a previously healthy 23-year-old female who was diagnosed with Diffuse Large B-Cell Lymphoma on 2017.  She presented to medical attention with significant lower back pain and upon MRI in the ER was diagnosed with bone tumor.  A biopsy would prove her disease with a diagnosis of Diffuse Large B-Cell Lymphoma.  Bilateral bone marrow staging on 2017 was performed and found to be positive.  Ultimately she was diagnosed with CNS negative, CSF negative, bone marrow positive and Stage IV disease. Ngoc underwent treatment as per the High Risk, Group B Arm of [id number](NOS).  Her therapy was complicated by Streptococcus viridans bacteremia/sepsis as well as some fever and neutropenia. Ngoc otherwise tolerated her therapy well and completed her course on 2017.  She presents today for her 89 months off therapy visit.     On presentation today, Ngoc does have considerable anxiety regarding her disease and the late effects for which it has caused.  She is most concerned about debilitating chronic pain for which she has seen orthopedics, neurosurgery, as well as chronic pain management.  She reports that none of the management seems to be improving her  pain.  Her pain is primarily located in her back and hips and as she reports, she has not seen any improvement despite any of the pain management.  She reports that she has numbness in her legs as well for which she is currently scheduled for an EMG.  In addition to her complaints of pain, she also reports that she has difficulty with urination and often has blood in her urine.  She is followed by a urologist for this.  She denies however any signs or symptoms of acute illness, fever, shortness of breath, headache or pallor.  No complaints of any nausea, vomiting, diarrhea.  She does report chronic constipation.  No complaints of any skin changes or rashes.  No other concerns or complaints at this time.    Review of Systems:     Constitutional: Afebrile.  Without recent illness.  Energy and activity are good.   HENT: Negative for ear pain, nasal congestion or rhinorrhea, nosebleeds and sore throat.  No mouth sores.  Eyes: Negative for visual changes.  Respiratory: Negative for shortness of breath or noisy breathing.   Cardiovascular: Negative for chest pain or extremity swelling.    Gastrointestinal: Negative for nausea, vomiting, abdominal pain, diarrhea, constipation or blood in stool.    Genitourinary: Negative for painful urination, blood in urine or flank pain.    Musculoskeletal: Negative for joint or muscle pains.    Skin: Negative for rash, signs of infection.  Neurological: Negative for numbness, tingling, sensory changes, weakness or headaches.    Endo/Heme/Allergies: Does not bruise/bleed easily.    Psychiatric/Behavioral: No changes in mood, appropriate for age.     PAST MEDICAL HISTORY:     Oncology History:  Diagnosed with Diffuse Large B-Cell Lymphoma 4/11/17  Confirmed Diagnosis with bilateral bone marrow staging 4/13/17  Diffuse Large B-Cell Lymphoma, CNS -kristi, CSF -kristi, bone marrow +kristi, Stage IV  Treatment per High Risk, Group B ([id number])  Consent for treatment signed by mother 4/14/17  Pre-Phase  R- started 4/14/17  Tolerated well, no TLS, only complication was LP related headache  End of R- evaluation with bilateral bone marrow biopsies/aspirates remarkable for complete/near complete response  R-COPADM1 started 4/21/17  Complicated by Streptococcus viridans bacteremia/sepsis, hematemesis, HSV1 reactivation, oppiod induced constipation  End of R-COPADM1 evaluation with PET-CT scan remarkable for near complete response, area of focal activity in left iliac crest  Recovery of counts (COPADM1) at Day 12, start of R-COPADM2 Day 1 at R-COPADM1 Day 18  R-COPADM2 started 5/8/17  Complicated by severe opioid constipation, prolonged fever  Recovery of counts (R-COPADM2) at Day 16, start of R-CYM1 Day1 today at R-COPADM2 Day 18    No End of R-COPADM2 evaluation, next response evaluation at end of RCYM-1  R-CYM1 started 5/25/17  No complications of therapy.  Initial recovery of ANC at Day 15, subsequent drop at Day 21, true recovery at Day 27  End of R-CYM1 PET-CT scan demonstrated near complete/complete response (some very mild asymmetric activity L proximal femur)  End of R-CYM1 bone marrow biopsy and aspirate of left iliac crest negative for disease  Start of R-CYM2 6/21/17  Recovery and End of R-CYM2 6/28/17     End of Therapy 6/28/2017     Past Medical History:     1) Major Depressive Disorder  2) History of Polysubstance Abuse  3) Anxiety  4) Diffuse Large B-Cell Lymphoma  5) Prolonged Viral Illnes (Post-Rituximab)  6) Osteopenia (therapy induced)  7) Recurrent urinary tract infections  8) Pregnancy     Past Surgical History:      1) IR Bone Biopsy  2) Port a cath placement  3) Lumbar punctures, treatment related  4) Bilateral bone marrow biopsy x 2  5) Unilateral bone marrow biopsy x 1     Menstrual History:  No longer having menstrual periods, 8 weeks pregnant     Allergies:         Allergies as of 10/31/2018    (No Known Allergies)      Social History:   Living back at home with her mother and son.   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      Family History:  Maternal family history remarkable for breast cancer in maternal grandmother, melanoma in uncle and benign brain tumor in mother following childbirth.  No remarkable paternal family history.  No family history of pediatric disease, no family history of pediatric cancer.  No autoimmune disease, no rheumatological disease.  No bleeding, clotting disorders.     Medications:   Current Outpatient Medications on File Prior to Encounter   Medication Sig Dispense Refill    traMADol (ULTRAM) 50 MG Tab Take 50 mg by mouth every four hours as needed.      acyclovir (ZOVIRAX) 400 MG tablet Take 400 mg by mouth 2 times a day.      methocarbamol (ROBAXIN) 750 MG Tab Take 750 mg by mouth 2 times a day as needed.      meloxicam (MOBIC) 15 MG tablet Take 1 Tablet by mouth every day. 30 Tablet 2    ALPRAZolam (XANAX) 0.25 MG Tab TAKE 1 TABLET BY MOUTH ONCE A DAY AS NEEDED FOR AXIETY      amphetamine-dextroamphetamine (ADDERALL) 10 MG Tab 20 mg 2 times a day.      methylPREDNISolone Acetate (DEPO-MEDROL INJ) Inject  as directed.      lamotrigine (LAMICTAL) 200 MG tablet Take 300 mg by mouth every day.      HYDROcodone-acetaminophen (NORCO) 5-325 MG Tab per tablet TAKE ONE TO TWO TABLETS BY MOUTH EVERY 6 HOURS AS NEEDED FOR UP TO 5 DAYS (Patient not taking: Reported on 11/8/2024)      busPIRone (BUSPAR) 5 MG tablet  (Patient not taking: Reported on 11/8/2024)      meloxicam (MOBIC) 7.5 MG Tab Take 2 Tablets by mouth every day. (Patient not taking: Reported on 11/8/2024) 60 Tablet 1    hydrOXYzine pamoate (VISTARIL) 25 MG Cap TAKE 1 CAPSULE BY MOUTH TWICE DAILY AS NEEDED FOR INSOMNIA OR ANXIETY (Patient not taking: Reported on 11/8/2024)       No current facility-administered medications on file prior to encounter.     OBJECTIVE:     Vitals:   /68 (BP Location: Right arm, Patient Position: Sitting, BP Cuff Size: Adult)   Pulse 99   Temp 36.5 °C (97.7 °F) (Temporal)   Ht 1.605 m (5' 3.19\") "   Wt 62 kg (136 lb 11 oz)   SpO2 100%     Labs:    Hospital Outpatient Visit on 11/08/2024   Component Date Value    WBC 11/08/2024 7.6     RBC 11/08/2024 4.75     Hemoglobin 11/08/2024 14.4     Hematocrit 11/08/2024 42.9     MCV 11/08/2024 90.3     MCH 11/08/2024 30.3     MCHC 11/08/2024 33.6     RDW 11/08/2024 43.8     Platelet Count 11/08/2024 377     MPV 11/08/2024 9.2     Neutrophils-Polys 11/08/2024 44.60     Lymphocytes 11/08/2024 47.50 (H)     Monocytes 11/08/2024 5.90     Eosinophils 11/08/2024 1.00     Basophils 11/08/2024 0.30     Immature Granulocytes 11/08/2024 0.70     Nucleated RBC 11/08/2024 0.00     Neutrophils (Absolute) 11/08/2024 3.41     Lymphs (Absolute) 11/08/2024 3.63     Monos (Absolute) 11/08/2024 0.45     Eos (Absolute) 11/08/2024 0.08     Baso (Absolute) 11/08/2024 0.02     Immature Granulocytes (a* 11/08/2024 0.05     NRBC (Absolute) 11/08/2024 0.00     Sodium 11/08/2024 137     Potassium 11/08/2024 3.9     Chloride 11/08/2024 104     Co2 11/08/2024 21     Anion Gap 11/08/2024 12.0     Glucose 11/08/2024 88     Bun 11/08/2024 16     Creatinine 11/08/2024 0.64     Calcium 11/08/2024 9.7     Correct Calcium 11/08/2024 9.2     AST(SGOT) 11/08/2024 18     ALT(SGPT) 11/08/2024 25     Alkaline Phosphatase 11/08/2024 102 (H)     Total Bilirubin 11/08/2024 0.3     Albumin 11/08/2024 4.6     Total Protein 11/08/2024 7.9     Globulin 11/08/2024 3.3     A-G Ratio 11/08/2024 1.4     25-Hydroxy   Vitamin D 25 11/08/2024 22 (L)     Ferritin 11/08/2024 73.9     Magnesium 11/08/2024 2.3     Phosphorus 11/08/2024 3.0     Anti-Mullerian Hormone 11/08/2024 1.171     LDH Total 11/08/2024 192     GFR (CKD-EPI) 11/08/2024 127        Physical Exam:    Constitutional: Well-developed, well-nourished, and in no distress.  Well appearing.  Nervous.  HENT: Normocephalic and atraumatic. No nasal congestion or rhinorrhea. Oropharynx is clear and moist. No oral ulcerations or sores.    Eyes: Conjunctivae are normal.  Pupils are equal, round.  EOMI.  Nonicteric.  Neck: Normal range of motion of neck, no adenopathy.    Cardiovascular: Normal rate, regular rhythm and normal heart sounds.  No murmur heard. DP/radial pulses 2+, cap refill < 2 sec.  Pulmonary/Chest: Effort normal and breath sounds normal. No respiratory distress. Symmetric expansion.  No crackles or wheezes.  Abdomen: Soft. Bowel sounds are normal. No distension and no mass. There is no hepatosplenomegaly.    Genitourinary:  Deferred.  Musculoskeletal: Normal range of motion of lower and upper extremities bilaterally. No tenderness to palpation of elbows, wrists, hands, knees, ankles and feet bilaterally.   Lymphadenopathy: No cervical adenopathy, axillary adenopathy or inguinal adenopathy.   Neurological: Alert and oriented to person and place. Exhibits normal muscle tone bilaterally in upper and lower extremities. Gait normal. Coordination normal.    Skin: Skin is warm, dry and pink.  No rash or evidence of skin infection.  No pallor.   Psychiatric: Mood and affect normal for age.      ASSESSMENT AND PLAN:     Ngoc Morales is a 23 y.o. female with history of Diffuse Large B-Cell Lymphoma s/p treatment as per [id number] with Rituximab (NOT ON STUDY)     1) Diffuse Large B-Cell Lymphoma:              - Treatment as per [id number] (NOS), Group B - High Risk (Stage IV, CNS -kristi, CSF -kristi) + Rituximab              - Off therapy now 89 months                 - CBC with WBC 7.6, Hgb 14.4, platelets 316              - ANC 3410, ALC 3630              - AST 18, ALT 25 with total bilirubin 0.3              - Vitamin D 22                 - Both labs and physical examination reassuring and DERICK                            - RTC in 12 months or sooner if needed, working to transition care to PMD given very unlikely risk of relapse but with need to monitor late effects.     2) Monitoring Labs:              - CBC with WBC 7.6, Hgb 14.4, platelets 316              - ANC 3410, ALC 3630      3) Survivorship/Late Effects Monitoring:              - Cognitive: No identified cognitive deficits or learning disabilities.  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              - Psychosocial:  No changes in behavior.              - Renal:  /68.  Electrolytes normal with creatinine 0.64, continue to monitor              - Cardiac: 120 mg/m2 cumulative dose of doxorubicin.  No radiation.  Given low-dose anthracycline without radiation and age at treatment, recommendation for ECHO every 5 years.  ECHO obtained 10/2022 demonstrating EF of 60% but with mild tricuspid regurgitation.  Plan for repeat ECHO 2027.              - Pulmonary:  No chest/mediastinal radiation, no exposure to chemotherapies with pulmonary toxicity.              - Musckuloskeletal: History of osteopenia as diagnosed on DEXA scan 2/20/2018, very poorly adherent with vitamin D supplementation.  No recent DEXA scan.  Complains of persistent lower back and hip pain.  Will evaluate with plain radiographs however DEXA scan recommended to reevaluate bone mineral density.  Also at risk given Depo-Provera.  Vitamin D today 22, encourage supplementation with vitamin D -not taking reliably.              - Growth and Development: No concerns.              - Reproductive: Has demonstrated fertility.  Currently mother.  Antimuelllerian hormone today 1.71.  [place], no premature ovarian failure as of yet.  Discussed again today.              - [place]: 80     4) History of hypovitaminosis D:              - Non-adherent with vitamin D, level 22 today              - Re encouraged vitamin D and calcium supplementation given history of osteopenia       5) Osteopenia:              - Had previously recommended yearly DEXA scan until improvement in osteopenia however has not had any recent DEXA scans              - Encourage DEXA scan              - Continue calcium and vitamin D     6) Chronic Pain:   - Significant and debilitating chronic pain especially lower back  and hips   - Has seen multiple specialist to include neurology (EMG upcoming) as well as neurosurgery and chronic pain specialists   - No improvement in pain per report   - Very possibly related to initial disease and therapy   - Continue to pursue support of chronic pain specialist     Disposition:  RTC 12 months    Jose Alfredo Theodore MD  Pediatric Hematology / Oncology  [facility]  Cell.  [phone]  Office. [phone]

## 2025-02-23 ENCOUNTER — APPOINTMENT (OUTPATIENT)
Dept: RADIOLOGY | Facility: MEDICAL CENTER | Age: 25
DRG: 552 | End: 2025-02-23
Attending: EMERGENCY MEDICINE
Payer: COMMERCIAL

## 2025-02-23 ENCOUNTER — HOSPITAL ENCOUNTER (INPATIENT)
Facility: MEDICAL CENTER | Age: 25
LOS: 3 days | DRG: 552 | End: 2025-03-04
Attending: EMERGENCY MEDICINE
Payer: COMMERCIAL

## 2025-02-23 DIAGNOSIS — M54.42 ACUTE BILATERAL LOW BACK PAIN WITH BILATERAL SCIATICA: ICD-10-CM

## 2025-02-23 DIAGNOSIS — G89.29 CHRONIC BILATERAL LOW BACK PAIN WITH BILATERAL SCIATICA: ICD-10-CM

## 2025-02-23 DIAGNOSIS — M54.41 ACUTE BILATERAL LOW BACK PAIN WITH BILATERAL SCIATICA: ICD-10-CM

## 2025-02-23 DIAGNOSIS — M54.41 CHRONIC BILATERAL LOW BACK PAIN WITH BILATERAL SCIATICA: ICD-10-CM

## 2025-02-23 DIAGNOSIS — M54.42 CHRONIC BILATERAL LOW BACK PAIN WITH BILATERAL SCIATICA: ICD-10-CM

## 2025-02-23 DIAGNOSIS — R52 INTRACTABLE PAIN: ICD-10-CM

## 2025-02-23 DIAGNOSIS — M85.80 OSTEOPENIA DUE TO CANCER THERAPY: ICD-10-CM

## 2025-02-23 DIAGNOSIS — M54.59 INTRACTABLE LOW BACK PAIN: Primary | ICD-10-CM

## 2025-02-23 PROBLEM — J30.9 ALLERGIC RHINITIS: Status: ACTIVE | Noted: 2025-02-23

## 2025-02-23 PROBLEM — R31.9 HEMATURIA: Status: ACTIVE | Noted: 2025-02-23

## 2025-02-23 PROBLEM — F10.90 ALCOHOL USE DISORDER: Status: ACTIVE | Noted: 2025-02-23

## 2025-02-23 LAB
ANION GAP SERPL CALC-SCNC: 13 MMOL/L (ref 7–16)
APPEARANCE UR: CLEAR
BACTERIA #/AREA URNS HPF: ABNORMAL /HPF
BASOPHILS # BLD AUTO: 0.2 % (ref 0–1.8)
BASOPHILS # BLD: 0.02 K/UL (ref 0–0.12)
BILIRUB UR QL STRIP.AUTO: NEGATIVE
BUN SERPL-MCNC: 11 MG/DL (ref 8–22)
CALCIUM SERPL-MCNC: 8.7 MG/DL (ref 8.5–10.5)
CASTS URNS QL MICRO: ABNORMAL /LPF (ref 0–2)
CHLORIDE SERPL-SCNC: 105 MMOL/L (ref 96–112)
CO2 SERPL-SCNC: 19 MMOL/L (ref 20–33)
COLOR UR: YELLOW
CREAT SERPL-MCNC: 0.65 MG/DL (ref 0.5–1.4)
EOSINOPHIL # BLD AUTO: 0.04 K/UL (ref 0–0.51)
EOSINOPHIL NFR BLD: 0.5 % (ref 0–6.9)
EPITHELIAL CELLS 1715: ABNORMAL /HPF (ref 0–5)
ERYTHROCYTE [DISTWIDTH] IN BLOOD BY AUTOMATED COUNT: 45.3 FL (ref 35.9–50)
ERYTHROCYTE [SEDIMENTATION RATE] IN BLOOD BY WESTERGREN METHOD: 1 MM/HOUR (ref 0–25)
GFR SERPLBLD CREATININE-BSD FMLA CKD-EPI: 126 ML/MIN/1.73 M 2
GLUCOSE SERPL-MCNC: 92 MG/DL (ref 65–99)
GLUCOSE UR STRIP.AUTO-MCNC: NEGATIVE MG/DL
HCG SERPL QL: NEGATIVE
HCT VFR BLD AUTO: 43.4 % (ref 37–47)
HGB BLD-MCNC: 14 G/DL (ref 12–16)
IMM GRANULOCYTES # BLD AUTO: 0.02 K/UL (ref 0–0.11)
IMM GRANULOCYTES NFR BLD AUTO: 0.2 % (ref 0–0.9)
KETONES UR STRIP.AUTO-MCNC: 40 MG/DL
LEUKOCYTE ESTERASE UR QL STRIP.AUTO: NEGATIVE
LYMPHOCYTES # BLD AUTO: 2.59 K/UL (ref 1–4.8)
LYMPHOCYTES NFR BLD: 29.2 % (ref 22–41)
MCH RBC QN AUTO: 29.8 PG (ref 27–33)
MCHC RBC AUTO-ENTMCNC: 32.3 G/DL (ref 32.2–35.5)
MCV RBC AUTO: 92.3 FL (ref 81.4–97.8)
MICRO URNS: ABNORMAL
MONOCYTES # BLD AUTO: 0.43 K/UL (ref 0–0.85)
MONOCYTES NFR BLD AUTO: 4.9 % (ref 0–13.4)
NEUTROPHILS # BLD AUTO: 5.76 K/UL (ref 1.82–7.42)
NEUTROPHILS NFR BLD: 65 % (ref 44–72)
NITRITE UR QL STRIP.AUTO: NEGATIVE
NRBC # BLD AUTO: 0 K/UL
NRBC BLD-RTO: 0 /100 WBC (ref 0–0.2)
PH UR STRIP.AUTO: 6 [PH] (ref 5–8)
PLATELET # BLD AUTO: 326 K/UL (ref 164–446)
PMV BLD AUTO: 9.4 FL (ref 9–12.9)
POTASSIUM SERPL-SCNC: 4.1 MMOL/L (ref 3.6–5.5)
PROT UR QL STRIP: NEGATIVE MG/DL
RBC # BLD AUTO: 4.7 M/UL (ref 4.2–5.4)
RBC # URNS HPF: ABNORMAL /HPF (ref 0–2)
RBC UR QL AUTO: ABNORMAL
SODIUM SERPL-SCNC: 137 MMOL/L (ref 135–145)
SP GR UR STRIP.AUTO: 1.03
UROBILINOGEN UR STRIP.AUTO-MCNC: 1 EU/DL
WBC # BLD AUTO: 8.9 K/UL (ref 4.8–10.8)
WBC #/AREA URNS HPF: ABNORMAL /HPF

## 2025-02-23 PROCEDURE — 700111 HCHG RX REV CODE 636 W/ 250 OVERRIDE (IP): Mod: JZ,UD | Performed by: EMERGENCY MEDICINE

## 2025-02-23 PROCEDURE — 96375 TX/PRO/DX INJ NEW DRUG ADDON: CPT

## 2025-02-23 PROCEDURE — 80048 BASIC METABOLIC PNL TOTAL CA: CPT

## 2025-02-23 PROCEDURE — 85025 COMPLETE CBC W/AUTO DIFF WBC: CPT

## 2025-02-23 PROCEDURE — G0378 HOSPITAL OBSERVATION PER HR: HCPCS

## 2025-02-23 PROCEDURE — 84703 CHORIONIC GONADOTROPIN ASSAY: CPT

## 2025-02-23 PROCEDURE — 36415 COLL VENOUS BLD VENIPUNCTURE: CPT

## 2025-02-23 PROCEDURE — 700111 HCHG RX REV CODE 636 W/ 250 OVERRIDE (IP): Mod: UD

## 2025-02-23 PROCEDURE — 96376 TX/PRO/DX INJ SAME DRUG ADON: CPT

## 2025-02-23 PROCEDURE — 99285 EMERGENCY DEPT VISIT HI MDM: CPT

## 2025-02-23 PROCEDURE — 99223 1ST HOSP IP/OBS HIGH 75: CPT | Mod: GC

## 2025-02-23 PROCEDURE — A9270 NON-COVERED ITEM OR SERVICE: HCPCS | Mod: UD | Performed by: EMERGENCY MEDICINE

## 2025-02-23 PROCEDURE — 85652 RBC SED RATE AUTOMATED: CPT

## 2025-02-23 PROCEDURE — 81001 URINALYSIS AUTO W/SCOPE: CPT

## 2025-02-23 PROCEDURE — 96374 THER/PROPH/DIAG INJ IV PUSH: CPT

## 2025-02-23 PROCEDURE — 700102 HCHG RX REV CODE 250 W/ 637 OVERRIDE(OP): Mod: UD

## 2025-02-23 PROCEDURE — A9270 NON-COVERED ITEM OR SERVICE: HCPCS | Mod: UD

## 2025-02-23 PROCEDURE — 700101 HCHG RX REV CODE 250: Mod: UD | Performed by: EMERGENCY MEDICINE

## 2025-02-23 PROCEDURE — 72148 MRI LUMBAR SPINE W/O DYE: CPT

## 2025-02-23 PROCEDURE — 94760 N-INVAS EAR/PLS OXIMETRY 1: CPT

## 2025-02-23 PROCEDURE — 700102 HCHG RX REV CODE 250 W/ 637 OVERRIDE(OP): Mod: UD | Performed by: EMERGENCY MEDICINE

## 2025-02-23 RX ORDER — HYDROMORPHONE HYDROCHLORIDE 1 MG/ML
1 INJECTION, SOLUTION INTRAMUSCULAR; INTRAVENOUS; SUBCUTANEOUS ONCE
Status: COMPLETED | OUTPATIENT
Start: 2025-02-23 | End: 2025-02-23

## 2025-02-23 RX ORDER — GABAPENTIN 300 MG/1
300 CAPSULE ORAL ONCE
Status: COMPLETED | OUTPATIENT
Start: 2025-02-23 | End: 2025-02-23

## 2025-02-23 RX ORDER — ENOXAPARIN SODIUM 100 MG/ML
40 INJECTION SUBCUTANEOUS DAILY
Status: DISCONTINUED | OUTPATIENT
Start: 2025-02-23 | End: 2025-03-04 | Stop reason: HOSPADM

## 2025-02-23 RX ORDER — POLYETHYLENE GLYCOL 3350 17 G/17G
1 POWDER, FOR SOLUTION ORAL
Status: DISCONTINUED | OUTPATIENT
Start: 2025-02-23 | End: 2025-02-28

## 2025-02-23 RX ORDER — LAMOTRIGINE 100 MG/1
300 TABLET ORAL DAILY
Status: DISCONTINUED | OUTPATIENT
Start: 2025-02-23 | End: 2025-03-04 | Stop reason: HOSPADM

## 2025-02-23 RX ORDER — ONDANSETRON 2 MG/ML
4 INJECTION INTRAMUSCULAR; INTRAVENOUS ONCE
Status: COMPLETED | OUTPATIENT
Start: 2025-02-23 | End: 2025-02-23

## 2025-02-23 RX ORDER — ACETAMINOPHEN 500 MG
1000 TABLET ORAL EVERY 6 HOURS PRN
Status: DISCONTINUED | OUTPATIENT
Start: 2025-02-28 | End: 2025-03-04 | Stop reason: HOSPADM

## 2025-02-23 RX ORDER — IBUPROFEN 800 MG/1
800 TABLET, FILM COATED ORAL 3 TIMES DAILY PRN
Status: DISCONTINUED | OUTPATIENT
Start: 2025-02-28 | End: 2025-02-24

## 2025-02-23 RX ORDER — LIDOCAINE 4 G/G
1 PATCH TOPICAL EVERY 24 HOURS
Status: DISCONTINUED | OUTPATIENT
Start: 2025-02-23 | End: 2025-03-04 | Stop reason: HOSPADM

## 2025-02-23 RX ORDER — ONDANSETRON 2 MG/ML
4 INJECTION INTRAMUSCULAR; INTRAVENOUS EVERY 4 HOURS PRN
Status: DISCONTINUED | OUTPATIENT
Start: 2025-02-23 | End: 2025-03-04 | Stop reason: HOSPADM

## 2025-02-23 RX ORDER — METHOCARBAMOL 750 MG/1
750 TABLET, FILM COATED ORAL 2 TIMES DAILY PRN
Status: DISCONTINUED | OUTPATIENT
Start: 2025-02-23 | End: 2025-02-24

## 2025-02-23 RX ORDER — ALPRAZOLAM 0.25 MG
0.25 TABLET ORAL
Status: DISCONTINUED | OUTPATIENT
Start: 2025-02-23 | End: 2025-03-04 | Stop reason: HOSPADM

## 2025-02-23 RX ORDER — DIPHENHYDRAMINE HCL 25 MG
25 TABLET ORAL EVERY 6 HOURS PRN
Status: DISCONTINUED | OUTPATIENT
Start: 2025-02-23 | End: 2025-03-04 | Stop reason: HOSPADM

## 2025-02-23 RX ORDER — ACETAMINOPHEN 500 MG
1000 TABLET ORAL EVERY 6 HOURS
Status: DISPENSED | OUTPATIENT
Start: 2025-02-23 | End: 2025-02-28

## 2025-02-23 RX ORDER — OXYCODONE HYDROCHLORIDE 5 MG/1
2.5 TABLET ORAL
Refills: 0 | Status: DISCONTINUED | OUTPATIENT
Start: 2025-02-23 | End: 2025-02-24

## 2025-02-23 RX ORDER — IBUPROFEN 800 MG/1
800 TABLET, FILM COATED ORAL 3 TIMES DAILY
Status: DISCONTINUED | OUTPATIENT
Start: 2025-02-23 | End: 2025-02-24

## 2025-02-23 RX ORDER — HYDROMORPHONE HYDROCHLORIDE 1 MG/ML
0.25 INJECTION, SOLUTION INTRAMUSCULAR; INTRAVENOUS; SUBCUTANEOUS
Status: DISCONTINUED | OUTPATIENT
Start: 2025-02-23 | End: 2025-02-24

## 2025-02-23 RX ORDER — OXYCODONE HYDROCHLORIDE 5 MG/1
5 TABLET ORAL
Refills: 0 | Status: DISCONTINUED | OUTPATIENT
Start: 2025-02-23 | End: 2025-02-24

## 2025-02-23 RX ORDER — AMOXICILLIN 250 MG
2 CAPSULE ORAL EVERY EVENING
Status: DISCONTINUED | OUTPATIENT
Start: 2025-02-23 | End: 2025-02-28

## 2025-02-23 RX ADMIN — KETAMINE HYDROCHLORIDE 25 MG: 10 INJECTION INTRAMUSCULAR; INTRAVENOUS at 15:15

## 2025-02-23 RX ADMIN — LAMOTRIGINE 300 MG: 100 TABLET ORAL at 22:53

## 2025-02-23 RX ADMIN — DIPHENHYDRAMINE HYDROCHLORIDE 25 MG: 25 TABLET ORAL at 21:22

## 2025-02-23 RX ADMIN — HYDROMORPHONE HYDROCHLORIDE 1 MG: 1 INJECTION, SOLUTION INTRAMUSCULAR; INTRAVENOUS; SUBCUTANEOUS at 17:35

## 2025-02-23 RX ADMIN — ACETAMINOPHEN 1000 MG: 500 TABLET ORAL at 21:21

## 2025-02-23 RX ADMIN — IBUPROFEN 800 MG: 800 TABLET, FILM COATED ORAL at 21:21

## 2025-02-23 RX ADMIN — ONDANSETRON 4 MG: 2 INJECTION INTRAMUSCULAR; INTRAVENOUS at 21:23

## 2025-02-23 RX ADMIN — SENNOSIDES AND DOCUSATE SODIUM 2 TABLET: 50; 8.6 TABLET ORAL at 21:21

## 2025-02-23 RX ADMIN — GABAPENTIN 300 MG: 300 CAPSULE ORAL at 16:51

## 2025-02-23 RX ADMIN — HYDROMORPHONE HYDROCHLORIDE 0.25 MG: 1 INJECTION, SOLUTION INTRAMUSCULAR; INTRAVENOUS; SUBCUTANEOUS at 22:53

## 2025-02-23 RX ADMIN — OXYCODONE 5 MG: 5 TABLET ORAL at 21:40

## 2025-02-23 RX ADMIN — ONDANSETRON 4 MG: 2 INJECTION INTRAMUSCULAR; INTRAVENOUS at 18:26

## 2025-02-23 RX ADMIN — LIDOCAINE 1 PATCH: 4 PATCH TOPICAL at 21:22

## 2025-02-23 RX ADMIN — HYDROMORPHONE HYDROCHLORIDE 1 MG: 1 INJECTION, SOLUTION INTRAMUSCULAR; INTRAVENOUS; SUBCUTANEOUS at 19:26

## 2025-02-23 SDOH — ECONOMIC STABILITY: TRANSPORTATION INSECURITY
IN THE PAST 12 MONTHS, HAS LACK OF RELIABLE TRANSPORTATION KEPT YOU FROM MEDICAL APPOINTMENTS, MEETINGS, WORK OR FROM GETTING THINGS NEEDED FOR DAILY LIVING?: NO

## 2025-02-23 SDOH — ECONOMIC STABILITY: TRANSPORTATION INSECURITY
IN THE PAST 12 MONTHS, HAS THE LACK OF TRANSPORTATION KEPT YOU FROM MEDICAL APPOINTMENTS OR FROM GETTING MEDICATIONS?: NO

## 2025-02-23 ASSESSMENT — PAIN DESCRIPTION - PAIN TYPE
TYPE: ACUTE PAIN
TYPE: CHRONIC PAIN
TYPE: ACUTE PAIN

## 2025-02-23 ASSESSMENT — SOCIAL DETERMINANTS OF HEALTH (SDOH)
WITHIN THE PAST 12 MONTHS, THE FOOD YOU BOUGHT JUST DIDN'T LAST AND YOU DIDN'T HAVE MONEY TO GET MORE: NEVER TRUE
WITHIN THE LAST YEAR, HAVE TO BEEN RAPED OR FORCED TO HAVE ANY KIND OF SEXUAL ACTIVITY BY YOUR PARTNER OR EX-PARTNER?: NO
IN THE PAST 12 MONTHS, HAS THE ELECTRIC, GAS, OIL, OR WATER COMPANY THREATENED TO SHUT OFF SERVICE IN YOUR HOME?: NO
WITHIN THE LAST YEAR, HAVE YOU BEEN HUMILIATED OR EMOTIONALLY ABUSED IN OTHER WAYS BY YOUR PARTNER OR EX-PARTNER?: NO
WITHIN THE LAST YEAR, HAVE YOU BEEN KICKED, HIT, SLAPPED, OR OTHERWISE PHYSICALLY HURT BY YOUR PARTNER OR EX-PARTNER?: NO
WITHIN THE LAST YEAR, HAVE YOU BEEN AFRAID OF YOUR PARTNER OR EX-PARTNER?: NO
WITHIN THE PAST 12 MONTHS, YOU WORRIED THAT YOUR FOOD WOULD RUN OUT BEFORE YOU GOT THE MONEY TO BUY MORE: NEVER TRUE

## 2025-02-23 ASSESSMENT — ENCOUNTER SYMPTOMS
SHORTNESS OF BREATH: 0
NAUSEA: 1
HEADACHES: 0
CHILLS: 0
DIZZINESS: 0
ABDOMINAL PAIN: 0
FLANK PAIN: 1
BACK PAIN: 1
FEVER: 0
VOMITING: 0
SENSORY CHANGE: 1
TINGLING: 1

## 2025-02-23 ASSESSMENT — COGNITIVE AND FUNCTIONAL STATUS - GENERAL
SUGGESTED CMS G CODE MODIFIER DAILY ACTIVITY: CH
SUGGESTED CMS G CODE MODIFIER MOBILITY: CH
DAILY ACTIVITIY SCORE: 24
MOBILITY SCORE: 24

## 2025-02-23 ASSESSMENT — PATIENT HEALTH QUESTIONNAIRE - PHQ9
SUM OF ALL RESPONSES TO PHQ9 QUESTIONS 1 AND 2: 0
1. LITTLE INTEREST OR PLEASURE IN DOING THINGS: NOT AT ALL
2. FEELING DOWN, DEPRESSED, IRRITABLE, OR HOPELESS: NOT AT ALL

## 2025-02-23 ASSESSMENT — LIFESTYLE VARIABLES
TOTAL SCORE: 0
EVER FELT BAD OR GUILTY ABOUT YOUR DRINKING: NO
CONSUMPTION TOTAL: INCOMPLETE
ALCOHOL_USE: YES
EVER HAD A DRINK FIRST THING IN THE MORNING TO STEADY YOUR NERVES TO GET RID OF A HANGOVER: NO
TOTAL SCORE: 0
TOTAL SCORE: 0
ON A TYPICAL DAY WHEN YOU DRINK ALCOHOL HOW MANY DRINKS DO YOU HAVE: 1
HAVE PEOPLE ANNOYED YOU BY CRITICIZING YOUR DRINKING: NO
AVERAGE NUMBER OF DAYS PER WEEK YOU HAVE A DRINK CONTAINING ALCOHOL: 6
HAVE YOU EVER FELT YOU SHOULD CUT DOWN ON YOUR DRINKING: NO

## 2025-02-23 ASSESSMENT — FIBROSIS 4 INDEX
FIB4 SCORE: 0.23
FIB4 SCORE: 0.27

## 2025-02-23 NOTE — ED PROVIDER NOTES
"ER Provider Note    Scribed for Tito Carson II, M.D. by Mian Ash. 2/23/2025  2:44 PM    Primary Care Provider: Pcp Pt States None    CHIEF COMPLAINT   Chief Complaint   Patient presents with    Low Back Pain    Hip Pain     EXTERNAL RECORDS REVIEWED  Reviewed record from neurodiagnostic laboratory outpatient visit on 12/4/24 for chronic back pain and paraesthesias of the lower extremities. She has had a sensory nerve conduction that showed normal Sural nerve action potential. Motor nerve conduction study showed normal conduction in the perineal and tibial motor latencies. tibial reflex responses were normal. No evidence for large fiber polyneuropathy or lumbosacral radiculopathy. MRI L-Spine on 8/20204 showed she had disc protrusion at L4/L5 and L5/S1, annular fissures. She also had an MRI in 2017 that showed the disc bulging at the same level.     She has been seen by Dr. Theodore for diffuse large B cell lymphoma. As of November she had been off therapy for 89 months. She was diagnosed after MRI for lower back pain that showed bone tumor.      HPI/ROS  LIMITATION TO HISTORY   Select: : None  OUTSIDE HISTORIAN(S):  None    Ngoc Morales is a 24 y.o. female who presents to the ED for evaluation of low back and hip pain onset Friday. She reports she was replacing a filter on her mother's furnace on Friday when she stood up, twisted, and pain began. She says she went home and tried to wait the pain out, but has had worsening symptoms. She also reports urinary incontinence. Hip to knees legs feel \"dead,\" with decreased sensation throughout the lower extremities. She reports difficulty standing due to leg numbness and weakness. She reports the pain feels different from previous symptoms.  She denies any paraesthesias or fever. She reports taking Meloxicam, methocarbamol, and Toradol, but nothing has helped her symptoms. She denies any IV drug use.  She does have a history of B-cell lymphoma.  Also has " distant history of bacteremia that looks to be related to past treatment for lymphoma.    PAST MEDICAL HISTORY  Past Medical History:   Diagnosis Date    Dental disorder     front tooth flipper    DLBCL (diffuse large B cell lymphoma) (HCC) 4/14/2017    Osteopenia due to cancer therapy 5/3/2019    Psychiatric problem     depression, anxiety    Urinary tract infection 5/3/2019     SURGICAL HISTORY  Past Surgical History:   Procedure Laterality Date    CATH REMOVAL  9/21/2017    Procedure: CATH REMOVAL- PORT;  Surgeon: Yoli Grullon M.D.;  Location: SURGERY [facility];  Service: General    GASTROSCOPY-ENDO  4/28/2017    Procedure: GASTROSCOPY-ENDO;  Surgeon: Everette Jett M.D.;  Location: ENDOSCOPY [facility];  Service:     CATH PLACEMENT Left 4/13/2017    Procedure: PORT PLACEMENT  ;  Surgeon: Yoli Grullon M.D.;  Location: SURGERY [facility];  Service:     BIOPSY GENERAL N/A 4/13/2017    Procedure: BONE MARROW BIOPSY AND ASPIRATION; LUMBAR PUNCTURE  ;  Surgeon: Yoli Grullon M.D.;  Location: SURGERY [facility];  Service:      FAMILY HISTORY  No family history noted.    SOCIAL HISTORY   reports that she has never smoked. She has quit using smokeless tobacco. She reports current alcohol use of about 2.4 oz of alcohol per week. She reports current drug use.    CURRENT MEDICATIONS  Previous Medications    ACYCLOVIR (ZOVIRAX) 400 MG TABLET    Take 400 mg by mouth 2 times a day.    ALPRAZOLAM (XANAX) 0.25 MG TAB    TAKE 1 TABLET BY MOUTH ONCE A DAY AS NEEDED FOR ANXIETY    AMPHETAMINE-DEXTROAMPHETAMINE (ADDERALL) 10 MG TAB    20 mg 2 times a day.    BUSPIRONE (BUSPAR) 5 MG TABLET        HYDROCODONE-ACETAMINOPHEN (NORCO) 5-325 MG TAB PER TABLET    TAKE ONE TO TWO TABLETS BY MOUTH EVERY 6 HOURS AS NEEDED FOR UP TO 5 DAYS    HYDROXYZINE PAMOATE (VISTARIL) 25 MG CAP    TAKE 1 CAPSULE BY MOUTH TWICE DAILY AS NEEDED FOR INSOMNIA OR ANXIETY    LAMOTRIGINE (LAMICTAL) 200 MG TABLET    Take 300 mg by  mouth every day.    MELOXICAM (MOBIC) 15 MG TABLET    Take 1 Tablet by mouth every day.    MELOXICAM (MOBIC) 7.5 MG TAB    Take 2 Tablets by mouth every day.    METHOCARBAMOL (ROBAXIN) 750 MG TAB    Take 750 mg by mouth 2 times a day as needed.    METHYLPREDNISOLONE ACETATE (DEPO-MEDROL INJ)    Inject  as directed.    TRAMADOL (ULTRAM) 50 MG TAB    Take 50 mg by mouth every four hours as needed.       ALLERGIES  Bactrim [sulfamethoxazole w-trimethoprim]    PHYSICAL EXAM  /64   Pulse 79   Temp 36.1 °C (96.9 °F) (Temporal)   Resp 18   Wt 61.7 kg (136 lb)   SpO2 96%   BMI 23.95 kg/m²   Physical Exam  Vitals and nursing note reviewed.   HENT:      Head: Normocephalic.      Mouth/Throat:      Mouth: Mucous membranes are moist.   Eyes:      Pupils: Pupils are equal, round, and reactive to light.   Cardiovascular:      Rate and Rhythm: Normal rate and regular rhythm.      Pulses: Normal pulses.   Pulmonary:      Effort: Pulmonary effort is normal.      Breath sounds: Normal breath sounds.   Musculoskeletal:      Right lower leg: No edema.      Left lower leg: No edema.      Comments: Midline lower L-spine tenderness.  Back pain worse when raising legs at hips.   Neurological:      General: No focal deficit present.      Mental Status: She is alert and oriented to person, place, and time.      Comments: Decreased sensation at bilateral thighs and lower legs.  Strength at EHL/FHL 3 out of 5 with flexion and extension bilaterally.  Also 3-5 strength at bilateral ankles.  2 out of 5 strength at knees and hip.  Difficult to distinguish if there is true weakness because she says any movements worsen her back pain.  Normal patellar reflexes.          DIAGNOSTIC STUDIES    EKG/LABS  Results for orders placed or performed during the hospital encounter of 02/23/25   CBC WITH DIFFERENTIAL    Collection Time: 02/23/25  2:59 PM   Result Value Ref Range    WBC 8.9 4.8 - 10.8 K/uL    RBC 4.70 4.20 - 5.40 M/uL    Hemoglobin  14.0 12.0 - 16.0 g/dL    Hematocrit 43.4 37.0 - 47.0 %    MCV 92.3 81.4 - 97.8 fL    MCH 29.8 27.0 - 33.0 pg    MCHC 32.3 32.2 - 35.5 g/dL    RDW 45.3 35.9 - 50.0 fL    Platelet Count 326 164 - 446 K/uL    MPV 9.4 9.0 - 12.9 fL    Neutrophils-Polys 65.00 44.00 - 72.00 %    Lymphocytes 29.20 22.00 - 41.00 %    Monocytes 4.90 0.00 - 13.40 %    Eosinophils 0.50 0.00 - 6.90 %    Basophils 0.20 0.00 - 1.80 %    Immature Granulocytes 0.20 0.00 - 0.90 %    Nucleated RBC 0.00 0.00 - 0.20 /100 WBC    Neutrophils (Absolute) 5.76 1.82 - 7.42 K/uL    Lymphs (Absolute) 2.59 1.00 - 4.80 K/uL    Monos (Absolute) 0.43 0.00 - 0.85 K/uL    Eos (Absolute) 0.04 0.00 - 0.51 K/uL    Baso (Absolute) 0.02 0.00 - 0.12 K/uL    Immature Granulocytes (abs) 0.02 0.00 - 0.11 K/uL    NRBC (Absolute) 0.00 K/uL   BASIC METABOLIC PANEL    Collection Time: 02/23/25  2:59 PM   Result Value Ref Range    Sodium 137 135 - 145 mmol/L    Potassium 4.1 3.6 - 5.5 mmol/L    Chloride 105 96 - 112 mmol/L    Co2 19 (L) 20 - 33 mmol/L    Glucose 92 65 - 99 mg/dL    Bun 11 8 - 22 mg/dL    Creatinine 0.65 0.50 - 1.40 mg/dL    Calcium 8.7 8.5 - 10.5 mg/dL    Anion Gap 13.0 7.0 - 16.0   BETA-HCG QUALITATIVE SERUM    Collection Time: 02/23/25  2:59 PM   Result Value Ref Range    Beta-Hcg Qualitative Serum Negative Negative   Sed Rate    Collection Time: 02/23/25  2:59 PM   Result Value Ref Range    Sed Rate Westergren 1 0 - 25 mm/hour   ESTIMATED GFR    Collection Time: 02/23/25  2:59 PM   Result Value Ref Range    GFR (CKD-EPI) 126 >60 mL/min/1.73 m 2   URINALYSIS CULTURE, IF INDICATED    Collection Time: 02/23/25  4:14 PM    Specimen: Urine, Clean Catch   Result Value Ref Range    Color Yellow     Character Clear     Specific Gravity 1.027 <1.035    Ph 6.0 5.0 - 8.0    Glucose Negative Negative mg/dL    Ketones 40 (A) Negative mg/dL    Protein Negative Negative mg/dL    Bilirubin Negative Negative    Urobilinogen, Urine 1.0 <=1.0 EU/dL    Nitrite Negative Negative     Leukocyte Esterase Negative Negative    Occult Blood Trace (A) Negative    Micro Urine Req Microscopic    URINE MICROSCOPIC (W/UA)    Collection Time: 02/23/25  4:14 PM   Result Value Ref Range    WBC 3-5 /hpf    RBC 6-10 (A) 0 - 2 /hpf    Bacteria None Seen None /hpf    Epithelial Cells 3-5 0 - 5 /hpf    Urine Casts 0-2 0 - 2 /lpf      RADIOLOGY/PROCEDURES     Radiologist interpretation:  MR-LUMBAR SPINE-W/O    (Results Pending)     Preliminary read from Dr. Sorto. Disc disease at L4-S1. No acute findings.     COURSE & MEDICAL DECISION MAKING     ASSESSMENT, COURSE AND PLAN  Care Narrative:     2:44 PM - This is an emergency department evaluation of a 24 y.o. female who presents with low back and hip pain since Friday.  She says this started after she was helping change a furnace filter and bending over.  Pain has been at the midline lower back, radiates to hip, associated with bilateral thigh decreased sensation and paresthesias in the lower legs.  She has a history of low back pain with disc protrusion at L4/L5 and L5/S1. History of large B cell lymphoma, off therapy for 89 months as of November. Ordered Sed rate, BMP, cbc with diff, beta hcg qual, and UA with culture. She will be medicated with ketamine 25 mg in 50 mL NS for pain. Discussed imaging following review of pending labs.  Dissipate MRI but would like to see that there are no increase in inflammatory markers prior to ordering.  If there is signs of inflammation that we have had contrast to MRI L-spine.  Most worrisome symptom is the incontinence which she has not had before.  The neurologic exam with strength is limited due to her pain.  But overall does appear to be weak.  Could be cord compression, cauda equina, mass lesion.  Could also be a less emergent condition such as radiculopathy, lumbar strain/muscle spasms.  Back pain is not necessarily new but the other symptoms reportedly are.  She has had a EMG testing a couple months ago that did  not show any radiculopathy or polyneuropathy.    4:22 PM  White count normal.  ESR still pending.  She has got some improvement with pain after ketamine infusion.    4:27 PM - Ordered gabapentin 300 mg PO.     5:00 PM - Sed rate normal. MRI ordered.     5:24 PM - Nursing informed me the patient is experiencing pain. Additional medication ordered.     7:01 PM - The patient returned from MRI, pain returned at around 6/10. Nursing informed me the patient has been ambulatory, moving her legs in bed.     7:15 PM - I was informed the patient was crying in pain. Ordered for additional medication for pain Dilaudid 1 mg IV.      7:28 PM - Patient was reevaluated at bedside. Radiology sent me preliminary interpretation of MRI which showed similar findings at L4-S1. Discussed MRI results with the patient. The patient will be hospitalized for intractable pain.    7:40 PM  [facility] resident call after request for admission. Team will see her and plan to hospitalize.     PROBLEM LIST  #Chronic lower back pain with acute exacerbation    #Intractable pain    DISPOSITION AND DISCUSSIONS  I have discussed management of the patient with the following physicians and DUDLEY's:  [facility]    Discussion of management with other QHP or appropriate source(s): None     DISPOSITION:  Patient will be hospitalized by [facility] (Dr. Archer)    FINAL DIAGNOSIS  1. Intractable pain Active   2. Acute bilateral low back pain with bilateral sciatica Active        Mian HURLEY (Fritz), am scribing for, and in the presence of, ÁNGELA Hanna II.    Electronically signed by: Mian Ash (Fritz), 2/23/2025    Tito HURLEY II, M* personally performed the services described in this documentation, as scribed by Mian Ash in my presence, and it is both accurate and complete.     The note accurately reflects work and decisions made by me.  Tito Carson II, M.D.  2/23/2025  7:41 PM

## 2025-02-23 NOTE — ED TRIAGE NOTES
Chief Complaint   Patient presents with    Low Back Pain    Hip Pain     Pt reports that she has been in bed since Friday after tweaking her back. Reports + loss of bladder control.  /64   Pulse 79   Temp 36.1 °C (96.9 °F) (Temporal)   Resp 18   Wt 61.7 kg (136 lb)   SpO2 96%   Pt informed of wait times. Educated on triage process.  Asked to return to triage RN for any new or worsening of symptoms. Thanked for patience.

## 2025-02-23 NOTE — ED NOTES
Pt to PU 71 in WC.  Pt able to bear weight, but c/o 9/10 back pain with any movement.  H/O cancer that has left her with degenerative disc disease.  Pt taking methocarbomol with no relief at home for pain.  Pt did receive IV fentanyl en route from EMS.

## 2025-02-24 ENCOUNTER — APPOINTMENT (OUTPATIENT)
Dept: RADIOLOGY | Facility: MEDICAL CENTER | Age: 25
DRG: 552 | End: 2025-02-24
Payer: COMMERCIAL

## 2025-02-24 LAB
ANION GAP SERPL CALC-SCNC: 12 MMOL/L (ref 7–16)
BUN SERPL-MCNC: 11 MG/DL (ref 8–22)
CALCIUM SERPL-MCNC: 9.2 MG/DL (ref 8.5–10.5)
CHLORIDE SERPL-SCNC: 102 MMOL/L (ref 96–112)
CO2 SERPL-SCNC: 23 MMOL/L (ref 20–33)
CREAT SERPL-MCNC: 0.67 MG/DL (ref 0.5–1.4)
ERYTHROCYTE [DISTWIDTH] IN BLOOD BY AUTOMATED COUNT: 45.3 FL (ref 35.9–50)
GFR SERPLBLD CREATININE-BSD FMLA CKD-EPI: 125 ML/MIN/1.73 M 2
GLUCOSE SERPL-MCNC: 119 MG/DL (ref 65–99)
HCT VFR BLD AUTO: 43.7 % (ref 37–47)
HGB BLD-MCNC: 14.2 G/DL (ref 12–16)
MCH RBC QN AUTO: 30.5 PG (ref 27–33)
MCHC RBC AUTO-ENTMCNC: 32.5 G/DL (ref 32.2–35.5)
MCV RBC AUTO: 93.8 FL (ref 81.4–97.8)
PLATELET # BLD AUTO: 302 K/UL (ref 164–446)
PMV BLD AUTO: 10 FL (ref 9–12.9)
POTASSIUM SERPL-SCNC: 3.5 MMOL/L (ref 3.6–5.5)
RBC # BLD AUTO: 4.66 M/UL (ref 4.2–5.4)
SODIUM SERPL-SCNC: 137 MMOL/L (ref 135–145)
WBC # BLD AUTO: 9 K/UL (ref 4.8–10.8)

## 2025-02-24 PROCEDURE — 51798 US URINE CAPACITY MEASURE: CPT

## 2025-02-24 PROCEDURE — 96376 TX/PRO/DX INJ SAME DRUG ADON: CPT

## 2025-02-24 PROCEDURE — 700101 HCHG RX REV CODE 250: Mod: UD | Performed by: EMERGENCY MEDICINE

## 2025-02-24 PROCEDURE — A9270 NON-COVERED ITEM OR SERVICE: HCPCS | Mod: UD

## 2025-02-24 PROCEDURE — 96372 THER/PROPH/DIAG INJ SC/IM: CPT

## 2025-02-24 PROCEDURE — 700111 HCHG RX REV CODE 636 W/ 250 OVERRIDE (IP): Mod: UD

## 2025-02-24 PROCEDURE — 85027 COMPLETE CBC AUTOMATED: CPT

## 2025-02-24 PROCEDURE — 99232 SBSQ HOSP IP/OBS MODERATE 35: CPT | Mod: FS | Performed by: HOSPITALIST

## 2025-02-24 PROCEDURE — 700102 HCHG RX REV CODE 250 W/ 637 OVERRIDE(OP): Mod: UD

## 2025-02-24 PROCEDURE — 700111 HCHG RX REV CODE 636 W/ 250 OVERRIDE (IP): Mod: JZ,UD

## 2025-02-24 PROCEDURE — 700117 HCHG RX CONTRAST REV CODE 255: Mod: JZ,UD

## 2025-02-24 PROCEDURE — 96375 TX/PRO/DX INJ NEW DRUG ADDON: CPT

## 2025-02-24 PROCEDURE — 80048 BASIC METABOLIC PNL TOTAL CA: CPT

## 2025-02-24 PROCEDURE — 36415 COLL VENOUS BLD VENIPUNCTURE: CPT

## 2025-02-24 PROCEDURE — G0378 HOSPITAL OBSERVATION PER HR: HCPCS

## 2025-02-24 PROCEDURE — 72157 MRI CHEST SPINE W/O & W/DYE: CPT

## 2025-02-24 PROCEDURE — A9579 GAD-BASE MR CONTRAST NOS,1ML: HCPCS | Mod: JZ,UD

## 2025-02-24 PROCEDURE — 72156 MRI NECK SPINE W/O & W/DYE: CPT

## 2025-02-24 RX ORDER — KETOROLAC TROMETHAMINE 15 MG/ML
15 INJECTION, SOLUTION INTRAMUSCULAR; INTRAVENOUS EVERY 6 HOURS
Status: DISCONTINUED | OUTPATIENT
Start: 2025-02-24 | End: 2025-02-24

## 2025-02-24 RX ORDER — KETOROLAC TROMETHAMINE 15 MG/ML
15 INJECTION, SOLUTION INTRAMUSCULAR; INTRAVENOUS EVERY 6 HOURS
Status: DISCONTINUED | OUTPATIENT
Start: 2025-02-24 | End: 2025-02-27

## 2025-02-24 RX ORDER — HYDROMORPHONE HYDROCHLORIDE 1 MG/ML
0.5 INJECTION, SOLUTION INTRAMUSCULAR; INTRAVENOUS; SUBCUTANEOUS
Status: DISCONTINUED | OUTPATIENT
Start: 2025-02-24 | End: 2025-03-03

## 2025-02-24 RX ORDER — METHYLPREDNISOLONE 4 MG/1
4 TABLET ORAL 2 TIMES DAILY WITH MEALS
Status: COMPLETED | OUTPATIENT
Start: 2025-02-28 | End: 2025-03-01

## 2025-02-24 RX ORDER — HYDROMORPHONE HYDROCHLORIDE 1 MG/ML
0.5 INJECTION, SOLUTION INTRAMUSCULAR; INTRAVENOUS; SUBCUTANEOUS ONCE
Status: COMPLETED | OUTPATIENT
Start: 2025-02-24 | End: 2025-02-24

## 2025-02-24 RX ORDER — KETOROLAC TROMETHAMINE 15 MG/ML
15 INJECTION, SOLUTION INTRAMUSCULAR; INTRAVENOUS EVERY 6 HOURS PRN
Status: DISCONTINUED | OUTPATIENT
Start: 2025-02-24 | End: 2025-02-24

## 2025-02-24 RX ORDER — OXYCODONE HYDROCHLORIDE 10 MG/1
10 TABLET ORAL
Refills: 0 | Status: DISCONTINUED | OUTPATIENT
Start: 2025-02-24 | End: 2025-02-25

## 2025-02-24 RX ORDER — METHOCARBAMOL 750 MG/1
750 TABLET, FILM COATED ORAL 2 TIMES DAILY
Status: DISCONTINUED | OUTPATIENT
Start: 2025-02-24 | End: 2025-02-25

## 2025-02-24 RX ORDER — METHYLPREDNISOLONE 4 MG/1
8 TABLET ORAL
Status: COMPLETED | OUTPATIENT
Start: 2025-02-25 | End: 2025-02-25

## 2025-02-24 RX ORDER — METHYLPREDNISOLONE 4 MG/1
4 TABLET ORAL
Status: COMPLETED | OUTPATIENT
Start: 2025-02-27 | End: 2025-02-27

## 2025-02-24 RX ORDER — OXYCODONE HYDROCHLORIDE 5 MG/1
5 TABLET ORAL
Refills: 0 | Status: DISCONTINUED | OUTPATIENT
Start: 2025-02-24 | End: 2025-02-25

## 2025-02-24 RX ORDER — METHYLPREDNISOLONE 4 MG/1
4 TABLET ORAL
Status: COMPLETED | OUTPATIENT
Start: 2025-02-26 | End: 2025-02-26

## 2025-02-24 RX ORDER — METHYLPREDNISOLONE 4 MG/1
4 TABLET ORAL
Status: COMPLETED | OUTPATIENT
Start: 2025-02-25 | End: 2025-02-25

## 2025-02-24 RX ORDER — POTASSIUM CHLORIDE 1500 MG/1
20 TABLET, EXTENDED RELEASE ORAL ONCE
Status: COMPLETED | OUTPATIENT
Start: 2025-02-24 | End: 2025-02-24

## 2025-02-24 RX ORDER — GABAPENTIN 100 MG/1
200 CAPSULE ORAL 3 TIMES DAILY
Status: DISCONTINUED | OUTPATIENT
Start: 2025-02-24 | End: 2025-02-25

## 2025-02-24 RX ADMIN — ONDANSETRON 4 MG: 2 INJECTION INTRAMUSCULAR; INTRAVENOUS at 15:28

## 2025-02-24 RX ADMIN — POTASSIUM CHLORIDE 20 MEQ: 1500 TABLET, EXTENDED RELEASE ORAL at 08:58

## 2025-02-24 RX ADMIN — OXYCODONE HYDROCHLORIDE 10 MG: 10 TABLET ORAL at 14:15

## 2025-02-24 RX ADMIN — SENNOSIDES AND DOCUSATE SODIUM 2 TABLET: 50; 8.6 TABLET ORAL at 17:33

## 2025-02-24 RX ADMIN — ONDANSETRON 4 MG: 2 INJECTION INTRAMUSCULAR; INTRAVENOUS at 06:03

## 2025-02-24 RX ADMIN — ONDANSETRON 4 MG: 2 INJECTION INTRAMUSCULAR; INTRAVENOUS at 09:50

## 2025-02-24 RX ADMIN — HYDROMORPHONE HYDROCHLORIDE 0.5 MG: 1 INJECTION, SOLUTION INTRAMUSCULAR; INTRAVENOUS; SUBCUTANEOUS at 19:14

## 2025-02-24 RX ADMIN — OXYCODONE HYDROCHLORIDE 10 MG: 10 TABLET ORAL at 04:24

## 2025-02-24 RX ADMIN — IBUPROFEN 800 MG: 800 TABLET, FILM COATED ORAL at 05:20

## 2025-02-24 RX ADMIN — GADOTERIDOL 13 ML: 279.3 INJECTION, SOLUTION INTRAVENOUS at 11:09

## 2025-02-24 RX ADMIN — KETOROLAC TROMETHAMINE 15 MG: 15 INJECTION, SOLUTION INTRAMUSCULAR; INTRAVENOUS at 17:35

## 2025-02-24 RX ADMIN — GABAPENTIN 200 MG: 100 CAPSULE ORAL at 17:33

## 2025-02-24 RX ADMIN — LIDOCAINE 1 PATCH: 4 PATCH TOPICAL at 20:18

## 2025-02-24 RX ADMIN — HYDROMORPHONE HYDROCHLORIDE 0.5 MG: 1 INJECTION, SOLUTION INTRAMUSCULAR; INTRAVENOUS; SUBCUTANEOUS at 05:19

## 2025-02-24 RX ADMIN — KETOROLAC TROMETHAMINE 15 MG: 15 INJECTION, SOLUTION INTRAMUSCULAR; INTRAVENOUS at 09:38

## 2025-02-24 RX ADMIN — HYDROMORPHONE HYDROCHLORIDE 0.5 MG: 1 INJECTION, SOLUTION INTRAMUSCULAR; INTRAVENOUS; SUBCUTANEOUS at 00:41

## 2025-02-24 RX ADMIN — OXYCODONE HYDROCHLORIDE 10 MG: 10 TABLET ORAL at 22:49

## 2025-02-24 RX ADMIN — ACETAMINOPHEN 1000 MG: 500 TABLET ORAL at 11:37

## 2025-02-24 RX ADMIN — GABAPENTIN 200 MG: 100 CAPSULE ORAL at 11:38

## 2025-02-24 RX ADMIN — ACETAMINOPHEN 1000 MG: 500 TABLET ORAL at 05:20

## 2025-02-24 RX ADMIN — METHOCARBAMOL 750 MG: 750 TABLET ORAL at 17:49

## 2025-02-24 RX ADMIN — HYDROMORPHONE HYDROCHLORIDE 0.5 MG: 1 INJECTION, SOLUTION INTRAMUSCULAR; INTRAVENOUS; SUBCUTANEOUS at 09:50

## 2025-02-24 RX ADMIN — METHOCARBAMOL 750 MG: 750 TABLET ORAL at 08:58

## 2025-02-24 RX ADMIN — OXYCODONE HYDROCHLORIDE 10 MG: 10 TABLET ORAL at 01:48

## 2025-02-24 RX ADMIN — METHYLPREDNISOLONE 24 MG: 16 TABLET ORAL at 09:39

## 2025-02-24 RX ADMIN — ENOXAPARIN SODIUM 40 MG: 100 INJECTION SUBCUTANEOUS at 17:32

## 2025-02-24 RX ADMIN — ONDANSETRON 4 MG: 2 INJECTION INTRAMUSCULAR; INTRAVENOUS at 20:18

## 2025-02-24 RX ADMIN — HYDROMORPHONE HYDROCHLORIDE 0.5 MG: 1 INJECTION, SOLUTION INTRAMUSCULAR; INTRAVENOUS; SUBCUTANEOUS at 14:14

## 2025-02-24 RX ADMIN — OXYCODONE HYDROCHLORIDE 10 MG: 10 TABLET ORAL at 07:28

## 2025-02-24 RX ADMIN — ACETAMINOPHEN 1000 MG: 500 TABLET ORAL at 00:22

## 2025-02-24 RX ADMIN — OXYCODONE HYDROCHLORIDE 10 MG: 10 TABLET ORAL at 17:34

## 2025-02-24 ASSESSMENT — ENCOUNTER SYMPTOMS
SORE THROAT: 0
BACK PAIN: 1
NAUSEA: 0
TINGLING: 1
VOMITING: 0
CHILLS: 0
BLURRED VISION: 0
WEAKNESS: 0
DOUBLE VISION: 0
SHORTNESS OF BREATH: 0
DEPRESSION: 0
ABDOMINAL PAIN: 0
COUGH: 0
PALPITATIONS: 0
FEVER: 0

## 2025-02-24 ASSESSMENT — PAIN DESCRIPTION - PAIN TYPE
TYPE: ACUTE PAIN

## 2025-02-24 NOTE — H&P
History & Physical Note    Date of Admission: 2/23/2025  Admission Status: Observation-Outpatient  [facility] Team: [facility]  Attending: Kyle Archer D.O.   Senior Resident: Dr. Sabrina Cardenas  Contact Number: [phone]    Chief Complaint: Low back pain     History of Present Illness (HPI):  Ngoc is a 24 y.o. female with a PMH of L4-L5 disc herniation and Hx of lymphoma (in remission), who presented 2/23/2025 with symptoms of acute worsening low back pain, onset Friday 02/21/2025 after standing up from a squatting position to replace a filter. Seen with mother at bedside. Patient reports she was diagnosed with L4-L5 disc herniation several months prior to current admission, states this is in the setting of osteopenia from prior treatment for her lymphoma, which has been in remission. She is being followed by pain management Dr. Nguyen and [facility]. Reports she was previously able to perform day to day activities as pain was previously resolved, has had persistent low back pain with sciatica since Friday. Pain is sharp and radiates to bilateral hips and bilateral lower extremities, associated with numbness and tingling of BLE, nausea, as well as worsening urinary incontinence that began the evening prior to admission. Reports she was ambulating to the bathroom and experienced complete loss of urine, has never had episodes of urinary incontinence previously. Denies fever, chills, history of IV drug use.     In the ER, VSS. Labs unremarkable. MRI lumbar spine shows moderate-sized central disc protrusion with slight interval increase since prior exam. Received gabapentin, hydromorphone, ketamine, and ondansetron in the ER.     Review of Systems:  Review of Systems   Constitutional:  Negative for chills and fever.   Respiratory:  Negative for shortness of breath.    Cardiovascular:  Negative for chest pain.   Gastrointestinal:  Positive for nausea. Negative for abdominal pain and vomiting.   Genitourinary:  Positive  for flank pain and hematuria. Negative for dysuria and urgency.   Musculoskeletal:  Positive for back pain.   Neurological:  Positive for tingling and sensory change. Negative for dizziness and headaches.       Past Medical History:   Past Medical History was reviewed with patient.   has a past medical history of Dental disorder, DLBCL (diffuse large B cell lymphoma) (HCC) (4/14/2017), Osteopenia due to cancer therapy (5/3/2019), Psychiatric problem, and Urinary tract infection (5/3/2019).    She has no past medical history of Asthma, Diabetes (HCC), Seizure (HCC), or Stroke (HCC).    Past Surgical History: Past Surgical History was reviewed with patient.   has a past surgical history that includes cath placement (Left, 4/13/2017); biopsy general (N/A, 4/13/2017); gastroscopy-endo (4/28/2017); and cath removal (9/21/2017).    Medications: Medications have been reviewed with patient.  Prior to Admission Medications   Prescriptions Last Dose Informant Patient Reported? Taking?   ALPRAZolam (XANAX) 0.25 MG Tab Unknown Patient Yes No   Sig: Take 0.25 mg by mouth 1 time a day as needed for Sleep or Anxiety.   amphetamine-dextroamphetamine (ADDERALL) 30 MG tablet 2/21/2025 Patient Yes No   Sig: Take 30 mg by mouth every day.   lamotrigine (LAMICTAL) 150 MG tablet 2/22/2025 Morning Patient Yes Yes   Sig: Take 300 mg by mouth every day. 2 tablets= 300mg   meloxicam (MOBIC) 15 MG tablet 2/21/2025 Patient No No   Sig: Take 1 Tablet by mouth every day.   Patient taking differently: Take 15 mg by mouth 1 time a day as needed for Mild Pain or Moderate Pain.   methocarbamol (ROBAXIN) 750 MG Tab 2/23/2025 at  6:30 AM Patient Yes Yes   Sig: Take 750 mg by mouth 2 times a day as needed (muscle spasms).   traMADol (ULTRAM) 50 MG Tab 2/22/2025 Morning Patient Yes Yes   Sig: Take 50 mg by mouth every four hours as needed for Mild Pain.      Facility-Administered Medications: None        Allergies: Allergies have been reviewed with  patient.  Allergies   Allergen Reactions    Bactrim [Sulfamethoxazole W-Trimethoprim] Vomiting and Nausea       Family History:  family history is not on file.     Social History:   Tobacco:  Vaping   Alcohol:  1 drink daily, occasionally more than 1 drink daily but no more than 2-3 drinks daily  Recreational drugs (illegal and prescription):  Marijuana   Primary Care Provider: reviewed Pcp Pt States None    Vitals:  Temp:  [36.1 °C (96.9 °F)-36.7 °C (98 °F)] 36.7 °C (98 °F)  Pulse:  [65-92] 77  Resp:  [17-20] 17  BP: (102-124)/(56-70) 124/64  SpO2:  [93 %-98 %] 98 %    Physical Exam  Constitutional:       General: She is not in acute distress.     Appearance: Normal appearance.   HENT:      Head: Normocephalic and atraumatic.   Eyes:      Extraocular Movements: Extraocular movements intact.      Conjunctiva/sclera: Conjunctivae normal.   Cardiovascular:      Rate and Rhythm: Normal rate and regular rhythm.   Pulmonary:      Effort: Pulmonary effort is normal. No respiratory distress.   Abdominal:      General: Abdomen is flat. There is no distension.      Palpations: Abdomen is soft.      Tenderness: There is right CVA tenderness and left CVA tenderness.   Musculoskeletal:      Right lower leg: No edema.      Left lower leg: No edema.   Skin:     General: Skin is warm and dry.   Neurological:      Mental Status: She is alert and oriented to person, place, and time. Mental status is at baseline.      Sensory: Sensory deficit present.      Comments: Straight leg test positive at 10 degrees.   Psychiatric:         Mood and Affect: Mood normal.         Behavior: Behavior normal.           Labs:   Lab Results   Component Value Date/Time    WBC 8.9 02/23/2025 02:59 PM    RBC 4.70 02/23/2025 02:59 PM    HEMOGLOBIN 14.0 02/23/2025 02:59 PM    HEMATOCRIT 43.4 02/23/2025 02:59 PM    MCV 92.3 02/23/2025 02:59 PM    MCH 29.8 02/23/2025 02:59 PM    MCHC 32.3 02/23/2025 02:59 PM    MPV 9.4 02/23/2025 02:59 PM    NEUTSPOLYS 65.00  "02/23/2025 02:59 PM    LYMPHOCYTES 29.20 02/23/2025 02:59 PM    MONOCYTES 4.90 02/23/2025 02:59 PM    EOSINOPHILS 0.50 02/23/2025 02:59 PM    BASOPHILS 0.20 02/23/2025 02:59 PM    HYPOCHROMIA 1+ 06/06/2017 04:40 AM    ANISOCYTOSIS 2+ 06/29/2017 04:00 AM        Lab Results   Component Value Date/Time    SODIUM 137 02/23/2025 02:59 PM    POTASSIUM 4.1 02/23/2025 02:59 PM    CHLORIDE 105 02/23/2025 02:59 PM    CO2 19 (L) 02/23/2025 02:59 PM    GLUCOSE 92 02/23/2025 02:59 PM    BUN 11 02/23/2025 02:59 PM    CREATININE 0.65 02/23/2025 02:59 PM        No results found for: \"WLRIP33G\", \"MMJGCQ166U\", \"VSTPH320C\", \"ARTHCO3\", \"ARTBE\", \"GAPBG\", \"LPJ8IFF8\"    Lab Results   Component Value Date/Time    PROTHROMBTM 13.0 04/28/2017 01:19 AM    INR 0.95 04/28/2017 01:19 AM        Imaging:   MR-LUMBAR SPINE-W/O  Narrative: 2/23/2025 6:40 PM    HISTORY/REASON FOR EXAM:  low back pain, sensory deficit, urinary incontinence.  Recent pregnancy. Urinary incontinence.    TECHNIQUE/EXAM DESCRIPTION:  MRI of the lumbar spine without contrast.    The study was performed on a Jethro 1.5 Kaylyn MRI scanner.  T1 sagittal, T2 fast spin-echo sagittal, T2 fat-suppressed sagittal, and T2 axial images were obtained of the lumbar spine.    COMPARISON:  MRI lumbar spine 8/19/2024    FINDINGS:  Alignment in the lumbar spine is normal. Marrow signal shows minimal linear T2 hyperintense edema signal along the inferior endplate of L4 toward the right consistent with Modic type I discogenic change. This can be a pain generator (T2 fat-suppressed   sagittal image 11, series 301).    The prevertebral and paraspinous soft tissues are unremarkable.    The conus is normal in position and signal with its tip at the L1 level.    At T12-L1 through L3-4 disc height and signal is normal. At L4-5 and L5-S1 there is normal disc height with degenerative loss of disc signal.    At T12-L1 through L3-4, there is no significant disk bulge or protrusion.  There is no " central stenosis or foraminal stenosis.  There is no significant ligamentous or facet hypertrophy..    At L4-5, there is a moderate-sized central disc protrusion slightly favoring the left with small components of cephalad and caudad extrusion. There is focal impression on the ventral thecal sac without ramandeep central stenosis. The disc shows minimally   increased fullness in measurable size in the AP dimension previously measuring about 7 mm beyond the posterior vertebral body margin and now about 8 mm. There is borderline left lateral recess stenosis. There is only minimal thinning of inferior   foraminal fat bilaterally at most, borderline bilateral foraminal stenosis.    At L5-S1, there is mild broad-based primarily central disc bulging. No significant affect on the ventral thecal sac as there is intervening ventral epidural fat. No central stenosis. No significant foraminal stenosis. No significant facet hypertrophy.  Impression: 1.  L4-5 moderate-sized central disc protrusion slightly favoring the left with small components of cephalad and caudad extrusion. Focal impression on the ventral thecal sac without central stenosis. Slight interval increase since prior exam. Borderline   left lateral recess stenosis. Borderline bilateral foraminal stenosis.  2.  L5-S1 mild broad-based central disc bulging. No central stenosis or significant foraminal stenosis.        Previous Data Review: reviewed    Assessment and Plan:   Patient is a 25 yo F with a PMH of recently diagnosed L4-L5 disc herniation, Hx of B-cell lymphoma with subsequent treatment-related osteopenia, and mood disorder, admitted 02/23/2025 for intractable low back pain and urinary incontinence, secondary to acute on chronic L4-L5 disc herniation.    * Intractable low back pain- (present on admission)  Assessment & Plan  Secondary to L4-L5 disc herniation. Associated symptoms of urinary incontinence and BLE numbness and tingling. MRI lumbar spine shows  moderate-sized central disc protrusion with slight interval increase since prior exam.  -Pain control  -Continue outpatient methacarbamol  -Day team to consider neurosurgery consult  -NPO at midnight    Alcohol use disorder  Assessment & Plan  Drinks 1 drink daily, occasionally more than 1 drink daily but no more than 2-3 drinks daily.  -Encourage cessation    Hematuria  Assessment & Plan  Incidental finding on UA. Not associated with LUTS.  -Follow-up with urology as scheduled as outpatient    History of B-cell lymphoma- (present on admission)  Assessment & Plan  Dx 2017. In remission.  -Follow-up with oncology Dr. Theodore as outpatient    Chronic bilateral low back pain with bilateral sciatica- (present on admission)  Assessment & Plan  -See assessment and plan for intractable low back pain  -Follow-up with pain management Dr. Nguyen and neurology as outpatient    Allergic rhinitis  Assessment & Plan  -Diphenhydramine PRN        Code Status: FULL CODE  DVT prophylaxis: Enoxaparin  Diet: Regular  GI: None  Disposition: Inpatient    Sabrina Cardenas MD  PGY-3 Internal Medicine

## 2025-02-24 NOTE — PROGRESS NOTES
Patient left the floor for MRI at this time via wheelchair with transport, medicated for pain and nausea prior to leaving

## 2025-02-24 NOTE — PROGRESS NOTES
Case d/w IM.  Admitted with LBP.  C/O numbness LEs and bladder dysfunction.  Lumbar MRI reviewed:  small central L4-5 disc without stenosis.  With Hx of lymphoma, rec MRI C and T spine s/c contrast.  Monitor urine outputs and immediate PVRs.  If above come back negative:  Neurology/? Urology consultation.

## 2025-02-24 NOTE — PROGRESS NOTES
[facility] Medicine Daily Progress Note    Date of Service  2/24/2025    Chief Complaint  Ngoc Morales is a 24 y.o. female admitted 2/23/2025 with back pain.    Hospital Course  Ngoc is a 24 y.o. female with a PMH of L4-L5 disc herniation and Hx of lymphoma (in remission), who presented 2/23/2025 with symptoms of acute worsening low back pain.    Patient states that symptoms started after standing up from a squatting position to replace a filter. Seen with mother at bedside. Patient reports she was diagnosed with L4-L5 disc herniation several months prior to current admission, states this is in the setting of osteopenia from prior treatment for her lymphoma, which has been in remission. She is being followed by pain management Dr. Nguyen and [facility]. Reports she was previously able to perform day to day activities as pain was previously resolved, has had persistent low back pain with sciatica since Friday. Pain is sharp and radiates to bilateral hips and bilateral lower extremities, associated with numbness and tingling of BLE, nausea, as well as worsening urinary incontinence that began the evening prior to admission. Reports she was ambulating to the bathroom and experienced complete loss of urine, has never had episodes of urinary incontinence previously. Denies fever, chills, history of IV drug use.      In the ER, VSS. Labs unremarkable. MRI lumbar spine shows moderate-sized central disc protrusion with slight interval increase since prior exam. Received gabapentin, hydromorphone, ketamine, and ondansetron in the ER.     Patient seen and examined this a.m.  Says that she is having significant pain that oral pain medication is not managing.  Discussed with her that we will implement multimodal pain management.  This I discussed with her that have consulted neurosurgery and at this point they are recommending further imaging with MRI cervical and thoracic spine as findings on imaging would not explain  her symptoms at this point.  Discussed with her that if imaging comes back negative we are implementing symptom management and she will need to follow-up outpatient once managed with oral medications.    Interval Problem Update  Patient is still having significant pain that oral pain medication is not managing at this time.  No further episodes of urinary continence.  -Plan of care: Multimodal pain management with p.o. and IV medication.  MRI cervical and thoracic spine.  -Disposition: Patient to remain overnight as observation status for further management.    I have discussed this patient's plan of care and discharge plan at IDT rounds today with Case Management, Nursing, Nursing leadership, and other members of the IDT team.    Consultants/Specialty  neurosurgery    Code Status  Full Code    Disposition  The patient is not medically cleared for discharge to home or a post-acute facility.  Anticipate discharge to: home with close outpatient follow-up    I have placed the appropriate orders for post-discharge needs.    Review of Systems  Review of Systems   Constitutional:  Negative for chills, fever and malaise/fatigue.   HENT:  Negative for congestion and sore throat.    Eyes:  Negative for blurred vision and double vision.   Respiratory:  Negative for cough and shortness of breath.    Cardiovascular:  Negative for chest pain, palpitations and leg swelling.   Gastrointestinal:  Negative for abdominal pain, nausea and vomiting.   Genitourinary:  Negative for dysuria and hematuria.   Musculoskeletal:  Positive for back pain.   Skin:  Negative for itching and rash.   Neurological:  Positive for tingling. Negative for weakness.        Numbness and tingling BLE   Psychiatric/Behavioral:  Negative for depression and suicidal ideas.         Physical Exam  Temp:  [36.1 °C (96.9 °F)-36.9 °C (98.4 °F)] 36.5 °C (97.7 °F)  Pulse:  [65-92] 90  Resp:  [16-20] 18  BP: (102-124)/(56-71) 118/71  SpO2:  [93 %-98 %] 97  %    Physical Exam  Constitutional:       Appearance: Normal appearance.   HENT:      Head: Normocephalic and atraumatic.      Nose: Nose normal.      Mouth/Throat:      Mouth: Mucous membranes are moist.   Eyes:      Pupils: Pupils are equal, round, and reactive to light.   Cardiovascular:      Rate and Rhythm: Normal rate and regular rhythm.      Pulses: Normal pulses.      Heart sounds: Normal heart sounds.   Pulmonary:      Effort: Pulmonary effort is normal.      Breath sounds: Normal breath sounds.   Abdominal:      General: Bowel sounds are normal.      Palpations: Abdomen is soft.   Musculoskeletal:         General: Normal range of motion.      Cervical back: Normal range of motion.   Skin:     General: Skin is warm and dry.   Neurological:      General: No focal deficit present.      Mental Status: She is alert. Mental status is at baseline.   Psychiatric:         Mood and Affect: Mood normal.         Behavior: Behavior normal.         Fluids    Intake/Output Summary (Last 24 hours) at 2/24/2025 1024  Last data filed at 2/23/2025 1529  Gross per 24 hour   Intake 50 ml   Output --   Net 50 ml        Laboratory  Recent Labs     02/23/25  1459 02/24/25  0141   WBC 8.9 9.0   RBC 4.70 4.66   HEMOGLOBIN 14.0 14.2   HEMATOCRIT 43.4 43.7   MCV 92.3 93.8   MCH 29.8 30.5   MCHC 32.3 32.5   RDW 45.3 45.3   PLATELETCT 326 302   MPV 9.4 10.0     Recent Labs     02/23/25  1459 02/24/25  0141   SODIUM 137 137   POTASSIUM 4.1 3.5*   CHLORIDE 105 102   CO2 19* 23   GLUCOSE 92 119*   BUN 11 11   CREATININE 0.65 0.67   CALCIUM 8.7 9.2                   Imaging  MR-LUMBAR SPINE-W/O   Final Result      1.  L4-5 moderate-sized central disc protrusion slightly favoring the left with small components of cephalad and caudad extrusion. Focal impression on the ventral thecal sac without central stenosis. Slight interval increase since prior exam. Borderline    left lateral recess stenosis. Borderline bilateral foraminal stenosis.    2.  L5-S1 mild broad-based central disc bulging. No central stenosis or significant foraminal stenosis.      MR-CERVICAL SPINE-WITH & W/O    (Results Pending)   MR-THORACIC SPINE-WITH & W/O    (Results Pending)        Assessment/Plan  * Intractable low back pain- (present on admission)  Assessment & Plan  Secondary to L4-L5 disc herniation. Associated symptoms of urinary incontinence and BLE numbness and tingling. MRI lumbar spine shows moderate-sized central disc protrusion with slight interval increase since prior exam.  Multimodal pain management-scheduled IV Toradol, scheduled Robaxin, scheduled Tylenol, scheduled lidocaine patch, scheduled Medrol Dosepak, scheduled gabapentin, as needed IV and p.o. analgesics  Neurosurgery consulted at this point recommending MRI C-spine/T-spine. Findings of the MRI of the lumbar spine without causing symptoms per neurosurgery.  Postvoid residual 28 cc.  Patient not retaining at this time.    Allergic rhinitis  Assessment & Plan  Diphenhydramine PRN    Alcohol use disorder  Assessment & Plan  Drinks 1 drink daily, occasionally more than 1 drink daily but no more than 2-3 drinks daily.  Encouraged cessation    Hematuria  Assessment & Plan  Incidental finding on UA. Not associated with LUTS.    History of B-cell lymphoma- (present on admission)  Assessment & Plan  Dx 2017. In remission.  Follow-up with oncology Dr. Theodore as outpatient    Chronic bilateral low back pain with bilateral sciatica- (present on admission)  Assessment & Plan  Follow-up with pain management Dr. Nguyen and neurology as outpatient         VTE prophylaxis:   SCDs/TEDs      I have performed a physical exam and reviewed and updated ROS and Plan today (2/24/2025). In review of yesterday's note (2/23/2025), there are no changes except as documented above.      IEbony A.P.R.N. performed a substantiated portion of the service face-to-face with same patient on the same date of service INDEPENDENTLY  FROM THE MD ON ASSESSMENT, EXAMINATION, AND DISCUSSION IN PLAN OF CARE FOR 21 MINUTES.  I was personally involved in reviewing and conducting the medical decision making, including the information as described below:

## 2025-02-24 NOTE — PROGRESS NOTES
Bedside report received from Tenzin CAMEJO at this time. Patient A+Ox4, on room air, C/O 7/10 pain to lower back and bilateral hips. Ambulating to BR with standby assist, steady gait. Medical patient. Using call light approp, bed alarm on

## 2025-02-24 NOTE — CARE PLAN
Problem: Pain - Standard  Goal: Alleviation of pain or a reduction in pain to the patient’s comfort goal  Description: Target End Date:  Prior to discharge or change in level of care    Document on Vitals flowsheet    1.  Document pain using the appropriate pain scale per order or unit policy  2.  Educate and implement non-pharmacologic comfort measures (i.e. relaxation, distraction, massage, cold/heat therapy, etc.)  3.  Pain management medications as ordered  4.  Reassess pain after pain med administration per policy  5.  If opiods administered assess patient's response to pain medication is appropriate per POSS sedation scale  6.  Follow pain management plan developed in collaboration with patient and interdisciplinary team (including palliative care or pain specialists if applicable)  Outcome: Progressing     Problem: Knowledge Deficit - Standard  Goal: Patient and family/care givers will demonstrate understanding of plan of care, disease process/condition, diagnostic tests and medications  Description: Target End Date:  1-3 days or as soon as patient condition allows    Document in Patient Education    1.  Patient and family/caregiver oriented to unit, equipment, visitation policy and means for communicating concern  2.  Complete/review Learning Assessment  3.  Assess knowledge level of disease process/condition, treatment plan, diagnostic tests and medications  4.  Explain disease process/condition, treatment plan, diagnostic tests and medications  Outcome: Progressing   The patient is Stable - Low risk of patient condition declining or worsening    Shift Goals  Clinical Goals: NPO midnight, pain control, safety  Patient Goals: Pain control, rest, nausea    Progress made toward(s) clinical / shift goals:  Pt pain managed per MAR. Fall risk precautions in place, remaining free from falls. NPO since midnight for possible neurosurgery consult. Resting throughout the night.    Patient is not progressing towards  the following goals:

## 2025-02-24 NOTE — PROGRESS NOTES
Patient arrived to CDU at this time via gurney with transport. In room 215. Mother at bedside. Patient able to walk from gurney to bed with standby assist, reports she lives at home alone and is normally independent for all ADLs. Reports she takes muscle relaxers at home for pain, minimal relief. On room air, VSS. Reports 4/10 pain at this time. Vitals taken, medical patient. Calls approp for needs

## 2025-02-24 NOTE — ASSESSMENT & PLAN NOTE
3/3/2025  Drinks 1 drink daily, occasionally more than 1 drink daily but no more than 2-3 drinks daily.  Encouraged cessation

## 2025-02-24 NOTE — HOSPITAL COURSE
Ngoc is a 24 y.o. female with a PMH of L4-L5 disc herniation and Hx of lymphoma (in remission), who presented 2/23/2025 with symptoms of acute worsening low back pain.    Patient states that symptoms started after standing up from a squatting position to replace a filter. Seen with mother at bedside. Patient reports she was diagnosed with L4-L5 disc herniation several months prior to current admission, states this is in the setting of osteopenia from prior treatment for her lymphoma, which has been in remission. She is being followed by pain management Dr. Nguyen and [facility]. Reports she was previously able to perform day to day activities as pain was previously resolved, has had persistent low back pain with sciatica since Friday. Pain is sharp and radiates to bilateral hips and bilateral lower extremities, associated with numbness and tingling of BLE, nausea, as well as worsening urinary incontinence that began the evening prior to admission. Reports she was ambulating to the bathroom and experienced complete loss of urine, has never had episodes of urinary incontinence previously. Denies fever, chills, history of IV drug use.      In the ER, VSS. Labs unremarkable. MRI lumbar spine shows moderate-sized central disc protrusion with slight interval increase since prior exam. Received gabapentin, hydromorphone, ketamine, and ondansetron in the ER.     Patient was started on multimodal pain regimen.  I consulted neurosurgery    MRI of cervical and thoracic spine also came back negative for tumor/cord compression.  Neurosurgery recommended nonoperative management  Patient was initially was having some urinary retention but this resolved.  Her postvoid residual has been monitored and it was slow.

## 2025-02-24 NOTE — ED NOTES
Medication history reviewed with patient & Family at bedside.   Med rec is complete  Allergies reviewed.     Patient has not had any outpatient antibiotics in the last 30 days.   Anticoagulants: No    Jyoti Vinson

## 2025-02-24 NOTE — PROGRESS NOTES
4 Eyes Skin Assessment Completed by Ronit RN and BASHIR Turk.    Head WDL  Ears WDL  Nose WDL  Mouth WDL  Neck WDL  Breast/Chest WDL  Shoulder Blades WDL  Spine WDL  (R) Arm/Elbow/Hand WDL  (L) Arm/Elbow/Hand WDL  Abdomen WDL  Groin WDL  Scrotum/Coccyx/Buttocks WDL  (R) Leg WDL  (L) Leg WDL  (R) Heel/Foot/Toe WDL  (L) Heel/Foot/Toe WDL      Devices In Places Blood Pressure Cuff and Pulse Ox      Interventions In Place Pillows    Possible Skin Injury No    Pictures Uploaded Into Epic N/A  Wound Consult Placed N/A  RN Wound Prevention Protocol Ordered No

## 2025-02-24 NOTE — ASSESSMENT & PLAN NOTE
3/3/2025  Secondary to L4-L5 disc herniation. Associated symptoms of urinary incontinence and BLE numbness and tingling. MRI lumbar spine shows moderate-sized central disc protrusion with slight interval increase since prior exam.  Multimodal pain management-scheduled IV Toradol, scheduled Robaxin, scheduled Tylenol, scheduled lidocaine patch, scheduled Medrol Dosepak, scheduled gabapentin, as needed IV and p.o. analgesics  Neurosurgery consulted at this point recommending MRI C-spine/T-spine. Findings of the MRI of the lumbar spine without causing symptoms per neurosurgery.  Postvoid residual 28 cc.  Patient not retaining at this time.

## 2025-02-24 NOTE — ED NOTES
MRI checklist completed.  PT is claustrophobic and requesting medications prior to scan.  Pt also has pieryas, RN to check with MRI about removal.

## 2025-02-25 ENCOUNTER — APPOINTMENT (OUTPATIENT)
Dept: RADIOLOGY | Facility: MEDICAL CENTER | Age: 25
End: 2025-02-25
Attending: INTERNAL MEDICINE
Payer: COMMERCIAL

## 2025-02-25 PROCEDURE — G0378 HOSPITAL OBSERVATION PER HR: HCPCS

## 2025-02-25 PROCEDURE — 700102 HCHG RX REV CODE 250 W/ 637 OVERRIDE(OP): Mod: UD | Performed by: INTERNAL MEDICINE

## 2025-02-25 PROCEDURE — 700111 HCHG RX REV CODE 636 W/ 250 OVERRIDE (IP): Mod: UD

## 2025-02-25 PROCEDURE — 96372 THER/PROPH/DIAG INJ SC/IM: CPT

## 2025-02-25 PROCEDURE — 700101 HCHG RX REV CODE 250: Mod: UD | Performed by: EMERGENCY MEDICINE

## 2025-02-25 PROCEDURE — A9270 NON-COVERED ITEM OR SERVICE: HCPCS | Mod: UD

## 2025-02-25 PROCEDURE — 700105 HCHG RX REV CODE 258: Mod: UD | Performed by: INTERNAL MEDICINE

## 2025-02-25 PROCEDURE — 70450 CT HEAD/BRAIN W/O DYE: CPT

## 2025-02-25 PROCEDURE — A9270 NON-COVERED ITEM OR SERVICE: HCPCS | Mod: UD | Performed by: INTERNAL MEDICINE

## 2025-02-25 PROCEDURE — 700102 HCHG RX REV CODE 250 W/ 637 OVERRIDE(OP)

## 2025-02-25 PROCEDURE — 700102 HCHG RX REV CODE 250 W/ 637 OVERRIDE(OP): Mod: UD

## 2025-02-25 PROCEDURE — A9270 NON-COVERED ITEM OR SERVICE: HCPCS

## 2025-02-25 PROCEDURE — 96376 TX/PRO/DX INJ SAME DRUG ADON: CPT

## 2025-02-25 PROCEDURE — 700111 HCHG RX REV CODE 636 W/ 250 OVERRIDE (IP): Mod: JZ,UD

## 2025-02-25 PROCEDURE — 99233 SBSQ HOSP IP/OBS HIGH 50: CPT | Performed by: INTERNAL MEDICINE

## 2025-02-25 RX ORDER — SODIUM CHLORIDE 9 MG/ML
INJECTION, SOLUTION INTRAVENOUS CONTINUOUS
Status: DISCONTINUED | OUTPATIENT
Start: 2025-02-25 | End: 2025-03-04 | Stop reason: HOSPADM

## 2025-02-25 RX ORDER — BACLOFEN 10 MG/1
10 TABLET ORAL 3 TIMES DAILY
Status: DISCONTINUED | OUTPATIENT
Start: 2025-02-25 | End: 2025-02-28

## 2025-02-25 RX ORDER — HYDROMORPHONE HYDROCHLORIDE 4 MG/1
2 TABLET ORAL
Refills: 0 | Status: DISCONTINUED | OUTPATIENT
Start: 2025-02-25 | End: 2025-02-26

## 2025-02-25 RX ORDER — GABAPENTIN 300 MG/1
300 CAPSULE ORAL 3 TIMES DAILY
Status: DISCONTINUED | OUTPATIENT
Start: 2025-02-25 | End: 2025-03-04 | Stop reason: HOSPADM

## 2025-02-25 RX ORDER — DEXTROAMPHETAMINE SACCHARATE, AMPHETAMINE ASPARTATE, DEXTROAMPHETAMINE SULFATE AND AMPHETAMINE SULFATE 2.5; 2.5; 2.5; 2.5 MG/1; MG/1; MG/1; MG/1
20 TABLET ORAL DAILY
Refills: 0 | Status: DISCONTINUED | OUTPATIENT
Start: 2025-02-26 | End: 2025-03-04 | Stop reason: HOSPADM

## 2025-02-25 RX ORDER — HYDROMORPHONE HYDROCHLORIDE 4 MG/1
4 TABLET ORAL
Refills: 0 | Status: DISCONTINUED | OUTPATIENT
Start: 2025-02-25 | End: 2025-02-26

## 2025-02-25 RX ADMIN — SODIUM CHLORIDE: 9 INJECTION, SOLUTION INTRAVENOUS at 18:36

## 2025-02-25 RX ADMIN — METHYLPREDNISOLONE 4 MG: 4 TABLET ORAL at 18:28

## 2025-02-25 RX ADMIN — HYDROMORPHONE HYDROCHLORIDE 0.5 MG: 1 INJECTION, SOLUTION INTRAMUSCULAR; INTRAVENOUS; SUBCUTANEOUS at 06:28

## 2025-02-25 RX ADMIN — ACETAMINOPHEN 1000 MG: 500 TABLET ORAL at 18:29

## 2025-02-25 RX ADMIN — KETOROLAC TROMETHAMINE 15 MG: 15 INJECTION, SOLUTION INTRAMUSCULAR; INTRAVENOUS at 23:07

## 2025-02-25 RX ADMIN — ACETAMINOPHEN 1000 MG: 500 TABLET ORAL at 11:54

## 2025-02-25 RX ADMIN — KETOROLAC TROMETHAMINE 15 MG: 15 INJECTION, SOLUTION INTRAMUSCULAR; INTRAVENOUS at 11:54

## 2025-02-25 RX ADMIN — ONDANSETRON 4 MG: 2 INJECTION INTRAMUSCULAR; INTRAVENOUS at 00:23

## 2025-02-25 RX ADMIN — OXYCODONE HYDROCHLORIDE 10 MG: 10 TABLET ORAL at 08:30

## 2025-02-25 RX ADMIN — HYDROMORPHONE HYDROCHLORIDE 4 MG: 4 TABLET ORAL at 18:29

## 2025-02-25 RX ADMIN — GABAPENTIN 200 MG: 100 CAPSULE ORAL at 05:02

## 2025-02-25 RX ADMIN — ENOXAPARIN SODIUM 40 MG: 100 INJECTION SUBCUTANEOUS at 18:28

## 2025-02-25 RX ADMIN — HYDROMORPHONE HYDROCHLORIDE 0.5 MG: 1 INJECTION, SOLUTION INTRAMUSCULAR; INTRAVENOUS; SUBCUTANEOUS at 16:28

## 2025-02-25 RX ADMIN — ONDANSETRON 4 MG: 2 INJECTION INTRAMUSCULAR; INTRAVENOUS at 11:54

## 2025-02-25 RX ADMIN — BACLOFEN 10 MG: 10 TABLET ORAL at 21:05

## 2025-02-25 RX ADMIN — OXYCODONE HYDROCHLORIDE 10 MG: 10 TABLET ORAL at 05:03

## 2025-02-25 RX ADMIN — KETOROLAC TROMETHAMINE 15 MG: 15 INJECTION, SOLUTION INTRAMUSCULAR; INTRAVENOUS at 05:03

## 2025-02-25 RX ADMIN — HYDROMORPHONE HYDROCHLORIDE 0.5 MG: 1 INJECTION, SOLUTION INTRAMUSCULAR; INTRAVENOUS; SUBCUTANEOUS at 10:00

## 2025-02-25 RX ADMIN — GABAPENTIN 300 MG: 300 CAPSULE ORAL at 18:28

## 2025-02-25 RX ADMIN — METHYLPREDNISOLONE 8 MG: 4 TABLET ORAL at 22:22

## 2025-02-25 RX ADMIN — HYDROMORPHONE HYDROCHLORIDE 0.5 MG: 1 INJECTION, SOLUTION INTRAMUSCULAR; INTRAVENOUS; SUBCUTANEOUS at 20:01

## 2025-02-25 RX ADMIN — KETOROLAC TROMETHAMINE 15 MG: 15 INJECTION, SOLUTION INTRAMUSCULAR; INTRAVENOUS at 00:22

## 2025-02-25 RX ADMIN — HYDROMORPHONE HYDROCHLORIDE 4 MG: 4 TABLET ORAL at 23:08

## 2025-02-25 RX ADMIN — METHYLPREDNISOLONE 4 MG: 4 TABLET ORAL at 13:15

## 2025-02-25 RX ADMIN — ACETAMINOPHEN 1000 MG: 500 TABLET ORAL at 05:02

## 2025-02-25 RX ADMIN — LIDOCAINE 1 PATCH: 4 PATCH TOPICAL at 21:06

## 2025-02-25 RX ADMIN — ALPRAZOLAM 0.25 MG: 0.25 TABLET ORAL at 22:22

## 2025-02-25 RX ADMIN — LAMOTRIGINE 300 MG: 100 TABLET ORAL at 05:02

## 2025-02-25 RX ADMIN — METHYLPREDNISOLONE 4 MG: 4 TABLET ORAL at 08:21

## 2025-02-25 RX ADMIN — GABAPENTIN 200 MG: 100 CAPSULE ORAL at 11:54

## 2025-02-25 RX ADMIN — ONDANSETRON 4 MG: 2 INJECTION INTRAMUSCULAR; INTRAVENOUS at 21:06

## 2025-02-25 RX ADMIN — BACLOFEN 10 MG: 10 TABLET ORAL at 14:42

## 2025-02-25 RX ADMIN — HYDROMORPHONE HYDROCHLORIDE 0.5 MG: 1 INJECTION, SOLUTION INTRAMUSCULAR; INTRAVENOUS; SUBCUTANEOUS at 00:23

## 2025-02-25 RX ADMIN — HYDROMORPHONE HYDROCHLORIDE 4 MG: 4 TABLET ORAL at 14:41

## 2025-02-25 RX ADMIN — METHOCARBAMOL 750 MG: 750 TABLET ORAL at 05:03

## 2025-02-25 RX ADMIN — SENNOSIDES AND DOCUSATE SODIUM 2 TABLET: 50; 8.6 TABLET ORAL at 18:28

## 2025-02-25 RX ADMIN — ACETAMINOPHEN 1000 MG: 500 TABLET ORAL at 00:24

## 2025-02-25 ASSESSMENT — PAIN DESCRIPTION - PAIN TYPE
TYPE: ACUTE PAIN
TYPE: ACUTE PAIN;CHRONIC PAIN
TYPE: ACUTE PAIN

## 2025-02-25 ASSESSMENT — ENCOUNTER SYMPTOMS
DEPRESSION: 0
DOUBLE VISION: 0
ABDOMINAL PAIN: 0
COUGH: 0
WEAKNESS: 0
BACK PAIN: 1
SORE THROAT: 0
VOMITING: 0
CHILLS: 0
SHORTNESS OF BREATH: 0
FEVER: 0
TINGLING: 1
BLURRED VISION: 0
PALPITATIONS: 0
NAUSEA: 0

## 2025-02-25 NOTE — PROGRESS NOTES
Neurosurgery Progress Note    Subjective:  States continued numbness, pain.  No PVR recorded overnight    Exam:  BLE str intact with giveaway, underlying str intact, improves with coaching.    BP  Min: 101/58  Max: 119/72  Pulse  Av.3  Min: 66  Max: 79  Resp  Av  Min: 18  Max: 18  Temp  Av.4 °C (97.6 °F)  Min: 36.1 °C (96.9 °F)  Max: 36.7 °C (98 °F)  SpO2  Av %  Min: 95 %  Max: 97 %    No data recorded    Recent Labs     25  1459 25  0141   WBC 8.9 9.0   RBC 4.70 4.66   HEMOGLOBIN 14.0 14.2   HEMATOCRIT 43.4 43.7   MCV 92.3 93.8   MCH 29.8 30.5   MCHC 32.3 32.5   RDW 45.3 45.3   PLATELETCT 326 302   MPV 9.4 10.0     Recent Labs     25  1459 25  0141   SODIUM 137 137   POTASSIUM 4.1 3.5*   CHLORIDE 105 102   CO2 19* 23   GLUCOSE 92 119*   BUN 11 11   CREATININE 0.65 0.67   CALCIUM 8.7 9.2               Intake/Output                         25 07 - 25 0659 25 07 - 25 0659     4640-1241 8171-5094 Total 1900-0659 Total                 Intake    Total Intake -- -- -- -- -- --       Output    Urine  --  -- --  --  -- --    Number of Times Voided 1 x -- 1 x -- -- --    Stool  --  -- --  --  -- --    Number of Times Stooled 0 x -- 0 x -- -- --    Total Output -- -- -- -- -- --       Net I/O     -- -- -- -- -- --            No intake or output data in the 24 hours ending 25 0912          oxyCODONE immediate-release  5 mg Q3HRS PRN    Or    oxyCODONE immediate-release  10 mg Q3HRS PRN    Or    HYDROmorphone  0.5 mg Q3HRS PRN    methocarbamol  750 mg BID    gabapentin  200 mg TID    methylPREDNISolone  4 mg 4X/DAY WITH MEALS + NIGHTLY    Followed by    methylPREDNISolone  8 mg 4X/DAY WITH MEALS + NIGHTLY    Followed by    [START ON 2025] methylPREDNISolone  4 mg 4X/DAY WITH MEALS + NIGHTLY    Followed by    [START ON 2025] methylPREDNISolone  4 mg TID WITH MEALS    Followed by    [START ON 2025] methylPREDNISolone  4 mg BID  WITH MEALS    ketorolac  15 mg Q6HRS    lidocaine  1 Patch Q24HR    enoxaparin (LOVENOX) injection  40 mg DAILY AT 1800    senna-docusate  2 Tablet Q EVENING    And    polyethylene glycol/lytes  1 Packet QDAY PRN    Pharmacy Consult Request  1 Each PHARMACY TO DOSE    acetaminophen  1,000 mg Q6HRS    Followed by    [START ON 2/28/2025] acetaminophen  1,000 mg Q6HRS PRN    diphenhydrAMINE  25 mg Q6HRS PRN    ondansetron  4 mg Q4HRS PRN    lamoTRIgine  300 mg DAILY    ALPRAZolam  0.25 mg QDAY PRN       Assessment and Plan:  Hospital day #2 LBP, LE numbness  Prophylactic anticoagulation: yes         Start date/time: tbd     See DV note. C/T WNL, lumbar disc without stenosis.  Rec Neurology/urology

## 2025-02-25 NOTE — PROGRESS NOTES
Assumed care of pt at 0700. Report received and bedside rounding completed with NOC RN. Pt is calm no SOB, no acute distress noted.    POC discussed with pt, questions answered. White board updated. Call light and pt belongings within reach - safety precautions and hourly rounding in place. See flowsheets for assessment.

## 2025-02-25 NOTE — DIETARY
"Nutrition Services: Initial Assessment     Day 2 of admit. Ngoc Morales is 24 y.o., female with admitting DX of Intractable pain [R52].    Consult Received for: MST - Malnutrition Screening Tool    Current Hospital Problems List:    Intractable low back pain  Alcohol use disorder  Allergic rhinitis  Chronic bilateral low back pain with bilateral sciatica  Hematuria  History of b-cell lymphoma     Nutrition Assessment:      Height:  160.5 cm (5' 3\")  Weight: 61.2 kg (135 lb)  Weight taken via: Estimated  BMI Calculated:  23.77 kg/m  BMI Classification: WNL     Weight Readings from Last 5 encounters:   Wt Readings from Last 5 Encounters:   02/23/25 61.2 kg (135 lb)   11/08/24 62 kg (136 lb 11 oz)   10/22/24 62.7 kg (138 lb 3.7 oz)   06/25/24 62.8 kg (138 lb 7.2 oz)   12/19/23 63.5 kg (140 lb)   Per chart review, pt with stable weight over the last year, assuming current estimated weight is correct.      Objective:   Pertinent Medical Hx: L4-L5 yeny herniation, diffuse large B cell lymphoma (in remission), osteopenia d/t cancer therapy  Pertinent Labs: potassium 3.6, glucose 119  Pertinent Meds: Zofran, BM protocol  Skin/Wounds: no wounds or edema noted  Food Allergies: NKFA  Last BM: PTA    Current Diet Order/Intake:   Regular diet  PO intake per ADLs has been <25% x 1 meal, 50-75% x 1 meal    Subjective:   Patient reported UBW: 135 lb    Met with pt at bedside. Pt reports that she thinks she has lost weight but unsure of how much. Noted her appetite has been fluctuating 2/2 nausea, and that it is better today than yesterday. Additionally notes that she tries to eat while nauseous as she finds her nausea is exacerbated when she doesn't eat. Reports she has had her food requests taken by nutrition representative.      Nutrition Focused Physical Exam (NFPE)  Weight Loss: unknown. Given pt's well nourished appearance, unlikely any weight loss she has had would be significant  Muscle Mass: Well " Nourished  Subcutaneous Fat: Well Nourished  Fluid Accumulation: none  Reduced  Strength: N/A in acute care setting.    Nutrition Diagnosis:      Based on RD assessment at this time, Patient does not meet criteria in congruence with ASPEN/Academy guidelines for malnutrition    Nutrition Interventions:      Encourage PO intake >50% of meals  Nutrition representative to continue to see pt for ongoing food requests  Patient aware of active plan of care as appropriate.     Nutrition Monitoring and Evaluation:      Obtain current weight  Monitor nutrition POC  Continue to record PO intake as % in ADLs  Monitor vital signs pertinent to nutrition.    RD following and will provide updated recommendations as indicated.                                           ASPEN/AND CRITERIA FOR MALNUTRITION

## 2025-02-25 NOTE — DISCHARGE PLANNING
1150  Received Choice form at 1140  Agency/Facility Name: [facility]  Referral sent per Choice form @ 1150     1257  DPA spoke with Luda ([facility]). Luda says can accept and asked to follow up once pt has PCP. Advised SUZANNA Kothari.

## 2025-02-25 NOTE — CARE PLAN
The patient is Stable - Low risk of patient condition declining or worsening    Shift Goals  Clinical Goals: pain management, nausea control.  Patient Goals: pain control and nausea control.  Family Goals: comfort and pain control    Progress made toward(s) clinical / shift goals:        Problem: Knowledge Deficit - Standard  Goal: Patient and family/care givers will demonstrate understanding of plan of care, disease process/condition, diagnostic tests and medications  Outcome: Progressing   POC discussed with pt. Pending pain management. MD notified of pt's concerns awaiting new orders. Goal to manage pain with PO pain medication for discharge.   Problem: Fall Risk  Goal: Patient will remain free from falls  Outcome: Progressing   Pt calling for assistance as needed. Pt educated on fall risk level. Pt is aaox4 and calling appropriately. Refusing strip.     Patient is not progressing towards the following goals:      Problem: Pain - Standard  Goal: Alleviation of pain or a reduction in pain to the patient’s comfort goal  Outcome: Not Progressing

## 2025-02-25 NOTE — PROGRESS NOTES
4 Eyes Skin Assessment Completed by BASHIR Rivers and Aviva CAMEJO.    Head WDL  Ears WDL  Nose WDL  Mouth WDL  Neck WDL  Breast/Chest WDL  Shoulder Blades WDL  Spine WDL  (R) Arm/Elbow/Hand Redness and Blanching  (L) Arm/Elbow/Hand Redness and Blanching  Abdomen WDL  Groin WDL  Scrotum/Coccyx/Buttocks WDL  (R) Leg WDL  (L) Leg WDL  (R) Heel/Foot/Toe Redness and Blanching  (L) Heel/Foot/Toe Redness and Blanching          Devices In Places none      Interventions In Place Pillows    Possible Skin Injury No    Pictures Uploaded Into Epic N/A  Wound Consult Placed N/A  RN Wound Prevention Protocol Ordered No

## 2025-02-25 NOTE — CARE PLAN
The patient is Watcher - Medium risk of patient condition declining or worsening    Shift Goals  Clinical Goals: pain control, nausea control  Patient Goals: pain control, nausea control, sleep  Family Goals: comfort and pain control    Progress made toward(s) clinical / shift goals:        Patient is not progressing towards the following goals:

## 2025-02-25 NOTE — DISCHARGE PLANNING
Care Transition Team Assessment    Patient is a 24 year-old female admitted for intractable low back pain. Please see patient's H&P for prior medical history. SW Student met with patient at bedside to complete assessment. Patient is A&Ox4 and able to verify the information on the face sheet. Patient lives alone in a two story apartment with no steps to enter, Maria T Morales (p) [phone]; NOK and emergency contact. No Advance Directive on file, patient currently working on completing AD and will provide hospital with copy. Prior to admission patient is independent with ADL's and IADL’s.  Patient does not use any DME at baseline. Patient reported that mother is good support for her. Patient reports, they work at [employer]. No PCP on [employer]. Patient's preferred pharmacy is [facility] on [facility]. Patient denies a history of SNF/IPR nor HHC use in the past. Patient denies any SA or MH concerns. Patient's confirmed medical coverage via [employer]. Patient has means of transportation when medically cleared and patients mom will provide transport once stable for discharge. Patient's primary oncologist is Dr. Theodore, Patient is in remission.     Patient requested HH referral. Obtained choice and faxed to [facility]. Patient reports having Medicaid as secondary insurance, requested PFA to confirm coverage.      Patient follows Dr. Charlene Guo at [facility] for pain management.      Patient moms address is [address] [place] [address].    Information Source  Orientation Level: Oriented X4  Information Given By: Patient    Readmission Evaluation  Is this a readmission?: No    Elopement Risk  Legal Hold: No  Ambulatory or Self Mobile in Wheelchair: Yes  Disoriented: No  Psychiatric Symptoms: None  History of Wandering: No  Elopement this Admit: No  Vocalizing Wanting to Leave: No  Displays Behaviors, Body Language Wanting to Leave: No-Not at Risk for Elopement  Elopement Risk: Not at Risk for  Elopement    Interdisciplinary Discharge Planning  Lives with - Patient's Self Care Capacity: Child Less than 18 Years of Age  Patient or legal guardian wants to designate a caregiver: No  Support Systems: Parent  Housing / Facility: 2 Story Apartment / Condo    Discharge Preparedness  What is your plan after discharge?: Home with help  What are your discharge supports?: Parent  Prior Functional Level: Independent with Activities of Daily Living, Ambulatory, Independent with Medication Management  Difficulity with ADLs: None  Difficulity with IADLs: None    Functional Assesment  Prior Functional Level: Independent with Activities of Daily Living, Ambulatory, Independent with Medication Management    Finances  Financial Barriers to Discharge: No  Prescription Coverage: No    Vision / Hearing Impairment  Vision Impairment : Yes  Right Eye Vision: Wears Glasses  Left Eye Vision: Wears Glasses  Hearing Impairment : No      Advance Directive  Advance Directive?: None    Domestic Abuse  Have you ever been the victim of abuse or violence?: No  Possible Abuse/Neglect Reported to:: Not Applicable    Psychological Assessment  History of Substance Abuse: None  History of Psychiatric Problems: No  Non-compliant with Treatment: No  Newly Diagnosed Illness: No    Discharge Risks or Barriers  Discharge risks or barriers?: Complex medical needs, Lives alone, no community support  Patient risk factors: Complex medical needs    Anticipated Discharge Information  Discharge Disposition: D/T to home under HHA care in anticipation of covered skilled care (06)  Discharge Address: [address] [place] [address] [place], [address]         [FreeTextEntry1] : Impression: Normal GYN exam, oral contraceptive use  Continue Seasonale  Recommendations: Self breast exam, mammography, vitamin supplementation regular exercise  Follow-up in 6 months

## 2025-02-25 NOTE — PROGRESS NOTES
[facility] Medicine Daily Progress Note    Date of Service  2/25/2025    Chief Complaint  Ngoc Morales is a 24 y.o. female admitted 2/23/2025 with back pain.    Hospital Course  Ngoc is a 24 y.o. female with a PMH of L4-L5 disc herniation and Hx of lymphoma (in remission), who presented 2/23/2025 with symptoms of acute worsening low back pain.    Patient states that symptoms started after standing up from a squatting position to replace a filter. Seen with mother at bedside. Patient reports she was diagnosed with L4-L5 disc herniation several months prior to current admission, states this is in the setting of osteopenia from prior treatment for her lymphoma, which has been in remission. She is being followed by pain management Dr. Nguyen and [facility]. Reports she was previously able to perform day to day activities as pain was previously resolved, has had persistent low back pain with sciatica since Friday. Pain is sharp and radiates to bilateral hips and bilateral lower extremities, associated with numbness and tingling of BLE, nausea, as well as worsening urinary incontinence that began the evening prior to admission. Reports she was ambulating to the bathroom and experienced complete loss of urine, has never had episodes of urinary incontinence previously. Denies fever, chills, history of IV drug use.      In the ER, VSS. Labs unremarkable. MRI lumbar spine shows moderate-sized central disc protrusion with slight interval increase since prior exam. Received gabapentin, hydromorphone, ketamine, and ondansetron in the ER.     Patient seen and examined this a.m.  Says that she is having significant pain that oral pain medication is not managing.  Discussed with her that we will implement multimodal pain management.  This I discussed with her that have consulted neurosurgery and at this point they are recommending further imaging with MRI cervical and thoracic spine as findings on imaging would not explain  her symptoms at this point.  Discussed with her that if imaging comes back negative we are implementing symptom management and she will need to follow-up outpatient once managed with oral medications.    MRI of cervical and thoracic spine also came back negative for tumor/cord compression.    Interval Problem Update  Patient is still having significant pain that oral pain medication is not managing at this time.  No further episodes of urinary continence.  -Plan of care: Multimodal pain management with p.o. and IV medication.  MRI cervical and thoracic spine.  -Disposition: Patient to remain overnight as observation status for further management.    Patient was seen and examined at bedside.  I have personally reviewed and interpreted vitals, labs, and imaging.    2/25.  Afebrile.  Stable vitals.  On room air.  Denies fever, chills or chest pains, shortness of breath.  Patient complains of persistent back pain.  She is upset with the regimen and does not feel that she is progressing.  We notified that she does not need surgery she felt like she was being pushed out the door may have been given a reason for why her back hurts.  Had long discussion with patient with her mom on the phone.  Sweats methocarbamol to baclofen.  Increase gabapentin.  Switch oral oxycodone to oral Dilaudid.  Switch oral oxycodone to oral Dilaudid.  She also requested to be restarted on her oral Adderall and may be going through withdrawals    I have discussed this patient's plan of care and discharge plan at IDT rounds today with Case Management, Nursing, Nursing leadership, and other members of the IDT team.    Consultants/Specialty  neurosurgery    Code Status  Full Code    Disposition  Medically Cleared  I have placed the appropriate orders for post-discharge needs.    Review of Systems  Review of Systems   Constitutional:  Negative for chills, fever and malaise/fatigue.   HENT:  Negative for congestion and sore throat.    Eyes:  Negative  for blurred vision and double vision.   Respiratory:  Negative for cough and shortness of breath.    Cardiovascular:  Negative for chest pain, palpitations and leg swelling.   Gastrointestinal:  Negative for abdominal pain, nausea and vomiting.   Genitourinary:  Negative for dysuria and hematuria.   Musculoskeletal:  Positive for back pain.   Skin:  Negative for itching and rash.   Neurological:  Positive for tingling. Negative for weakness.        Numbness and tingling BLE   Psychiatric/Behavioral:  Negative for depression and suicidal ideas.         Physical Exam  Temp:  [36.1 °C (96.9 °F)-36.7 °C (98 °F)] 36.1 °C (96.9 °F)  Pulse:  [66-75] 66  Resp:  [18] 18  BP: (101-111)/(58-65) 101/58  SpO2:  [95 %-97 %] 95 %    Physical Exam  Constitutional:       Appearance: Normal appearance.   HENT:      Head: Normocephalic and atraumatic.      Nose: Nose normal.      Mouth/Throat:      Mouth: Mucous membranes are moist.   Eyes:      Pupils: Pupils are equal, round, and reactive to light.   Cardiovascular:      Rate and Rhythm: Normal rate and regular rhythm.      Pulses: Normal pulses.      Heart sounds: Normal heart sounds.   Pulmonary:      Effort: Pulmonary effort is normal.      Breath sounds: Normal breath sounds.   Abdominal:      General: Bowel sounds are normal.      Palpations: Abdomen is soft.   Musculoskeletal:         General: Normal range of motion.      Cervical back: Normal range of motion.   Skin:     General: Skin is warm and dry.   Neurological:      General: No focal deficit present.      Mental Status: She is alert. Mental status is at baseline.   Psychiatric:         Mood and Affect: Mood normal.         Behavior: Behavior normal.         Fluids  No intake or output data in the 24 hours ending 02/25/25 0808       Laboratory  Recent Labs     02/23/25  1459 02/24/25  0141   WBC 8.9 9.0   RBC 4.70 4.66   HEMOGLOBIN 14.0 14.2   HEMATOCRIT 43.4 43.7   MCV 92.3 93.8   MCH 29.8 30.5   MCHC 32.3 32.5   RDW  45.3 45.3   PLATELETCT 326 302   MPV 9.4 10.0     Recent Labs     02/23/25  1459 02/24/25  0141   SODIUM 137 137   POTASSIUM 4.1 3.5*   CHLORIDE 105 102   CO2 19* 23   GLUCOSE 92 119*   BUN 11 11   CREATININE 0.65 0.67   CALCIUM 8.7 9.2                   Imaging  MR-THORACIC SPINE-WITH & W/O   Final Result      1.  Minor degenerative changes with multilevel tiny ventral extradural defects consistent with disc bulges or disc/osteophyte change at T2-T3, T5-T6, T6-T7, T8-T9. There is no cord impingement or central stenosis at any thoracic level.   2.  No myelopathic cord signal.      MR-CERVICAL SPINE-WITH & W/O   Final Result      1.  C5-6 mild left-sided inferior foraminal stenosis due to uncinate hypertrophy.   2.  C6-7 mild disc bulging. No central stenosis or foraminal stenosis.   3.  No myelopathic cord signal.      MR-LUMBAR SPINE-W/O   Final Result      1.  L4-5 moderate-sized central disc protrusion slightly favoring the left with small components of cephalad and caudad extrusion. Focal impression on the ventral thecal sac without central stenosis. Slight interval increase since prior exam. Borderline    left lateral recess stenosis. Borderline bilateral foraminal stenosis.   2.  L5-S1 mild broad-based central disc bulging. No central stenosis or significant foraminal stenosis.           Assessment/Plan  * Intractable low back pain- (present on admission)  Assessment & Plan  2/25/2025  Secondary to L4-L5 disc herniation. Associated symptoms of urinary incontinence and BLE numbness and tingling. MRI lumbar spine shows moderate-sized central disc protrusion with slight interval increase since prior exam.  Multimodal pain management-scheduled IV Toradol, scheduled Robaxin, scheduled Tylenol, scheduled lidocaine patch, scheduled Medrol Dosepak, scheduled gabapentin, as needed IV and p.o. analgesics  Neurosurgery consulted at this point recommending MRI C-spine/T-spine. Findings of the MRI of the lumbar spine  without causing symptoms per neurosurgery.  Postvoid residual 28 cc.  Patient not retaining at this time.    Allergic rhinitis  Assessment & Plan  2/25/2025  Diphenhydramine PRN    Alcohol use disorder  Assessment & Plan  2/25/2025  Drinks 1 drink daily, occasionally more than 1 drink daily but no more than 2-3 drinks daily.  Encouraged cessation    Hematuria  Assessment & Plan  2/25/2025  Incidental finding on UA. Not associated with LUTS.    History of B-cell lymphoma- (present on admission)  Assessment & Plan  2/25/2025  Dx 2017. In remission.  Follow-up with oncology Dr. Theodore as outpatient    Chronic bilateral low back pain with bilateral sciatica- (present on admission)  Assessment & Plan  2/25/2025  Follow-up with pain management Dr. Nguyen and neurology as outpatient         VTE prophylaxis: Lovenox    I have performed a physical exam and reviewed and updated ROS and Plan today (2/25/2025). In review of yesterday's note (2/24/2025), there are no changes except as documented above.    Greater than 51 minutes spent prepping to see patient (e.g. review of tests) obtaining and/or reviewing separately obtained history. Performing a medically appropriate examination and/ evaluation.  Counseling and educating the patient/family/caregiver.  Ordering medications, tests, or procedures.  Referring and communicating with other health care professionals.  Documenting clinical information in EPIC.  Independently interpreting results and communicating results to patient/family/caregiver.  Care coordination.

## 2025-02-26 ENCOUNTER — TELEPHONE (OUTPATIENT)
Dept: MEDICAL GROUP | Age: 25
End: 2025-02-26
Payer: MEDICAID

## 2025-02-26 LAB
25(OH)D3 SERPL-MCNC: 10 NG/ML (ref 30–100)
ALBUMIN SERPL BCP-MCNC: 3.9 G/DL (ref 3.2–4.9)
BUN SERPL-MCNC: 10 MG/DL (ref 8–22)
CALCIUM ALBUM COR SERPL-MCNC: 8.6 MG/DL (ref 8.5–10.5)
CALCIUM SERPL-MCNC: 8.5 MG/DL (ref 8.5–10.5)
CHLORIDE SERPL-SCNC: 107 MMOL/L (ref 96–112)
CO2 SERPL-SCNC: 21 MMOL/L (ref 20–33)
CREAT SERPL-MCNC: 0.61 MG/DL (ref 0.5–1.4)
ERYTHROCYTE [DISTWIDTH] IN BLOOD BY AUTOMATED COUNT: 45.1 FL (ref 35.9–50)
GFR SERPLBLD CREATININE-BSD FMLA CKD-EPI: 128 ML/MIN/1.73 M 2
GLUCOSE SERPL-MCNC: 101 MG/DL (ref 65–99)
HCT VFR BLD AUTO: 39.6 % (ref 37–47)
HGB BLD-MCNC: 13 G/DL (ref 12–16)
MAGNESIUM SERPL-MCNC: 1.8 MG/DL (ref 1.5–2.5)
MCH RBC QN AUTO: 30.2 PG (ref 27–33)
MCHC RBC AUTO-ENTMCNC: 32.8 G/DL (ref 32.2–35.5)
MCV RBC AUTO: 92.1 FL (ref 81.4–97.8)
PHOSPHATE SERPL-MCNC: 3.3 MG/DL (ref 2.5–4.5)
PLATELET # BLD AUTO: 243 K/UL (ref 164–446)
PLATELET # BLD AUTO: 307 K/UL (ref 164–446)
PMV BLD AUTO: 9.8 FL (ref 9–12.9)
POTASSIUM SERPL-SCNC: 4.1 MMOL/L (ref 3.6–5.5)
RBC # BLD AUTO: 4.3 M/UL (ref 4.2–5.4)
SODIUM SERPL-SCNC: 138 MMOL/L (ref 135–145)
TSH SERPL DL<=0.005 MIU/L-ACNC: 0.61 UIU/ML (ref 0.38–5.33)
WBC # BLD AUTO: 8.3 K/UL (ref 4.8–10.8)

## 2025-02-26 PROCEDURE — 96376 TX/PRO/DX INJ SAME DRUG ADON: CPT

## 2025-02-26 PROCEDURE — A9270 NON-COVERED ITEM OR SERVICE: HCPCS | Mod: UD | Performed by: INTERNAL MEDICINE

## 2025-02-26 PROCEDURE — G0378 HOSPITAL OBSERVATION PER HR: HCPCS

## 2025-02-26 PROCEDURE — 700111 HCHG RX REV CODE 636 W/ 250 OVERRIDE (IP): Mod: UD

## 2025-02-26 PROCEDURE — 84443 ASSAY THYROID STIM HORMONE: CPT

## 2025-02-26 PROCEDURE — 700101 HCHG RX REV CODE 250: Mod: UD | Performed by: EMERGENCY MEDICINE

## 2025-02-26 PROCEDURE — 700105 HCHG RX REV CODE 258: Mod: UD | Performed by: INTERNAL MEDICINE

## 2025-02-26 PROCEDURE — 80069 RENAL FUNCTION PANEL: CPT

## 2025-02-26 PROCEDURE — 700102 HCHG RX REV CODE 250 W/ 637 OVERRIDE(OP): Mod: UD

## 2025-02-26 PROCEDURE — 51798 US URINE CAPACITY MEASURE: CPT

## 2025-02-26 PROCEDURE — 36415 COLL VENOUS BLD VENIPUNCTURE: CPT

## 2025-02-26 PROCEDURE — 85027 COMPLETE CBC AUTOMATED: CPT

## 2025-02-26 PROCEDURE — 700111 HCHG RX REV CODE 636 W/ 250 OVERRIDE (IP): Mod: JZ,UD

## 2025-02-26 PROCEDURE — 83735 ASSAY OF MAGNESIUM: CPT

## 2025-02-26 PROCEDURE — 82306 VITAMIN D 25 HYDROXY: CPT

## 2025-02-26 PROCEDURE — A9270 NON-COVERED ITEM OR SERVICE: HCPCS | Mod: UD

## 2025-02-26 PROCEDURE — 97535 SELF CARE MNGMENT TRAINING: CPT

## 2025-02-26 PROCEDURE — 99233 SBSQ HOSP IP/OBS HIGH 50: CPT | Performed by: INTERNAL MEDICINE

## 2025-02-26 PROCEDURE — 96372 THER/PROPH/DIAG INJ SC/IM: CPT

## 2025-02-26 PROCEDURE — 97162 PT EVAL MOD COMPLEX 30 MIN: CPT

## 2025-02-26 PROCEDURE — 81001 URINALYSIS AUTO W/SCOPE: CPT

## 2025-02-26 PROCEDURE — 700102 HCHG RX REV CODE 250 W/ 637 OVERRIDE(OP): Mod: UD | Performed by: INTERNAL MEDICINE

## 2025-02-26 PROCEDURE — 700111 HCHG RX REV CODE 636 W/ 250 OVERRIDE (IP): Mod: JZ | Performed by: INTERNAL MEDICINE

## 2025-02-26 RX ORDER — ERGOCALCIFEROL 1.25 MG/1
50000 CAPSULE, LIQUID FILLED ORAL
Status: DISCONTINUED | OUTPATIENT
Start: 2025-02-27 | End: 2025-03-04 | Stop reason: HOSPADM

## 2025-02-26 RX ORDER — PROCHLORPERAZINE EDISYLATE 5 MG/ML
10 INJECTION INTRAMUSCULAR; INTRAVENOUS EVERY 6 HOURS PRN
Status: DISCONTINUED | OUTPATIENT
Start: 2025-02-26 | End: 2025-03-04 | Stop reason: HOSPADM

## 2025-02-26 RX ORDER — HYDROMORPHONE HYDROCHLORIDE 4 MG/1
6 TABLET ORAL
Refills: 0 | Status: DISCONTINUED | OUTPATIENT
Start: 2025-02-26 | End: 2025-02-27

## 2025-02-26 RX ORDER — HYDROMORPHONE HYDROCHLORIDE 4 MG/1
4 TABLET ORAL
Refills: 0 | Status: DISCONTINUED | OUTPATIENT
Start: 2025-02-26 | End: 2025-02-27

## 2025-02-26 RX ADMIN — ENOXAPARIN SODIUM 40 MG: 100 INJECTION SUBCUTANEOUS at 19:54

## 2025-02-26 RX ADMIN — HYDROMORPHONE HYDROCHLORIDE 0.5 MG: 1 INJECTION, SOLUTION INTRAMUSCULAR; INTRAVENOUS; SUBCUTANEOUS at 21:08

## 2025-02-26 RX ADMIN — POLYETHYLENE GLYCOL 3350 1 PACKET: 17 POWDER, FOR SOLUTION ORAL at 11:37

## 2025-02-26 RX ADMIN — METHYLPREDNISOLONE 4 MG: 4 TABLET ORAL at 19:55

## 2025-02-26 RX ADMIN — ONDANSETRON 4 MG: 2 INJECTION INTRAMUSCULAR; INTRAVENOUS at 07:57

## 2025-02-26 RX ADMIN — HYDROMORPHONE HYDROCHLORIDE 6 MG: 4 TABLET ORAL at 19:53

## 2025-02-26 RX ADMIN — SODIUM CHLORIDE: 9 INJECTION, SOLUTION INTRAVENOUS at 17:04

## 2025-02-26 RX ADMIN — ACETAMINOPHEN 1000 MG: 500 TABLET ORAL at 11:37

## 2025-02-26 RX ADMIN — METHYLPREDNISOLONE 4 MG: 4 TABLET ORAL at 07:57

## 2025-02-26 RX ADMIN — PROCHLORPERAZINE EDISYLATE 10 MG: 5 INJECTION INTRAMUSCULAR; INTRAVENOUS at 17:01

## 2025-02-26 RX ADMIN — HYDROMORPHONE HYDROCHLORIDE 4 MG: 4 TABLET ORAL at 02:24

## 2025-02-26 RX ADMIN — LAMOTRIGINE 300 MG: 100 TABLET ORAL at 05:22

## 2025-02-26 RX ADMIN — GABAPENTIN 300 MG: 300 CAPSULE ORAL at 05:28

## 2025-02-26 RX ADMIN — ACETAMINOPHEN 1000 MG: 500 TABLET ORAL at 05:21

## 2025-02-26 RX ADMIN — BACLOFEN 10 MG: 10 TABLET ORAL at 05:22

## 2025-02-26 RX ADMIN — HYDROMORPHONE HYDROCHLORIDE 6 MG: 4 TABLET ORAL at 15:22

## 2025-02-26 RX ADMIN — HYDROMORPHONE HYDROCHLORIDE 0.5 MG: 1 INJECTION, SOLUTION INTRAMUSCULAR; INTRAVENOUS; SUBCUTANEOUS at 10:08

## 2025-02-26 RX ADMIN — HYDROMORPHONE HYDROCHLORIDE 4 MG: 4 TABLET ORAL at 08:55

## 2025-02-26 RX ADMIN — BACLOFEN 10 MG: 10 TABLET ORAL at 11:37

## 2025-02-26 RX ADMIN — HYDROMORPHONE HYDROCHLORIDE 4 MG: 4 TABLET ORAL at 12:09

## 2025-02-26 RX ADMIN — DEXTROAMPHETAMINE SACCHARATE, AMPHETAMINE ASPARTATE, DEXTROAMPHETAMINE SULFATE, AMPHETAMINE SULFATE TABLETS, 10 MG,CLL 20 MG: 2.5; 2.5; 2.5; 2.5 TABLET ORAL at 12:09

## 2025-02-26 RX ADMIN — KETOROLAC TROMETHAMINE 15 MG: 15 INJECTION, SOLUTION INTRAMUSCULAR; INTRAVENOUS at 17:01

## 2025-02-26 RX ADMIN — ACETAMINOPHEN 1000 MG: 500 TABLET ORAL at 17:02

## 2025-02-26 RX ADMIN — KETOROLAC TROMETHAMINE 15 MG: 15 INJECTION, SOLUTION INTRAMUSCULAR; INTRAVENOUS at 05:22

## 2025-02-26 RX ADMIN — GABAPENTIN 300 MG: 300 CAPSULE ORAL at 11:37

## 2025-02-26 RX ADMIN — HYDROMORPHONE HYDROCHLORIDE 4 MG: 4 TABLET ORAL at 05:21

## 2025-02-26 RX ADMIN — HYDROMORPHONE HYDROCHLORIDE 0.5 MG: 1 INJECTION, SOLUTION INTRAMUSCULAR; INTRAVENOUS; SUBCUTANEOUS at 17:19

## 2025-02-26 RX ADMIN — BACLOFEN 10 MG: 10 TABLET ORAL at 17:02

## 2025-02-26 RX ADMIN — KETOROLAC TROMETHAMINE 15 MG: 15 INJECTION, SOLUTION INTRAMUSCULAR; INTRAVENOUS at 11:36

## 2025-02-26 RX ADMIN — ONDANSETRON 4 MG: 2 INJECTION INTRAMUSCULAR; INTRAVENOUS at 12:09

## 2025-02-26 RX ADMIN — METHYLPREDNISOLONE 4 MG: 4 TABLET ORAL at 17:19

## 2025-02-26 RX ADMIN — LIDOCAINE 1 PATCH: 4 PATCH TOPICAL at 19:55

## 2025-02-26 RX ADMIN — METHYLPREDNISOLONE 4 MG: 4 TABLET ORAL at 11:37

## 2025-02-26 RX ADMIN — SENNOSIDES AND DOCUSATE SODIUM 2 TABLET: 50; 8.6 TABLET ORAL at 17:02

## 2025-02-26 RX ADMIN — HYDROMORPHONE HYDROCHLORIDE 0.5 MG: 1 INJECTION, SOLUTION INTRAMUSCULAR; INTRAVENOUS; SUBCUTANEOUS at 06:36

## 2025-02-26 RX ADMIN — GABAPENTIN 300 MG: 300 CAPSULE ORAL at 17:02

## 2025-02-26 ASSESSMENT — ENCOUNTER SYMPTOMS
FEVER: 0
BLURRED VISION: 0
SORE THROAT: 0
COUGH: 0
VOMITING: 0
ABDOMINAL PAIN: 0
BACK PAIN: 1
WEAKNESS: 0
NAUSEA: 0
CHILLS: 0
TINGLING: 1
DEPRESSION: 0
DOUBLE VISION: 0
SHORTNESS OF BREATH: 0
PALPITATIONS: 0

## 2025-02-26 ASSESSMENT — PAIN DESCRIPTION - PAIN TYPE
TYPE: ACUTE PAIN

## 2025-02-26 ASSESSMENT — COGNITIVE AND FUNCTIONAL STATUS - GENERAL
MOVING TO AND FROM BED TO CHAIR: A LITTLE
MOBILITY SCORE: 17
TURNING FROM BACK TO SIDE WHILE IN FLAT BAD: A LITTLE
WALKING IN HOSPITAL ROOM: A LITTLE
MOVING FROM LYING ON BACK TO SITTING ON SIDE OF FLAT BED: A LITTLE
CLIMB 3 TO 5 STEPS WITH RAILING: A LOT
SUGGESTED CMS G CODE MODIFIER MOBILITY: CK
STANDING UP FROM CHAIR USING ARMS: A LITTLE

## 2025-02-26 ASSESSMENT — GAIT ASSESSMENTS
ASSISTIVE DEVICE: FRONT WHEEL WALKER
DEVIATION: ANTALGIC;BRADYKINETIC
DISTANCE (FEET): 10
GAIT LEVEL OF ASSIST: STANDBY ASSIST

## 2025-02-26 NOTE — THERAPY
Physical Therapy   Initial Evaluation     Patient Name: Ngoc Morales  Age:  24 y.o., Sex:  female  Medical Record #: [id number]  Today's Date: 2/26/2025     Precautions  Precautions: Fall Risk  Comments: low back pain with paresthesia and radiculopathy    Assessment    24 y.o. female was admitted to the hospital with intractable back pain after she stood up from a squatting position replacing a filter and felt the onset of pain.  Pt is reporting numbness and paresthesia circumferentially in BLEs with resting radiculopathy to B hips, increasing down to her knees and up to her mid back with flexion and extension respectively.  Pt also presents with weakness and decreased activity tolerance, requiring the use of a FWW for ambulation 10' x2 with a standing rest break.  Pt lives with her 3 y.o. son in a 2nd floor apartment with 20 MILLA and was independent for mobility without an AD, driving, and working at a desk job.  She reports 2 fall last June, falling straight down on her butt and back pain prior to those falls.  PMHx also includes lymphoma and osteopenia.  Pt will benefit from continued acute PT services to address the above deficits in order to return home safely.  Recommend home health PT services as the pt is ambulating < household distances.  She also reports she can stay with her mom, whose 1SH has only 5 MILLA.  Pt is currently still in the acute phase, with the onset incident happening 5 days ago, and was given ice to the back and educated on using 20 min on: 20 min off.    Plan    Physical Therapy Initial Treatment Plan   Treatment Plan : Bed Mobility, Equipment, Family / Caregiver Training, Gait Training, Manual Therapy, Neuro Re-Education / Balance, Self Care / Home Evaluation, Stair Training, Therapeutic Activities, Therapeutic Exercise  Treatment Frequency: 4 Times per Week  Duration: Until Therapy Goals Met    DC Equipment Recommendations: Front-Wheel Walker  Discharge Recommendations: Recommend home  [facility] for [facility] physical therapy services            Objective       02/26/25 0901   Initial Contact Note    Initial Contact Note Order Received and Verified, Physical Therapy Evaluation in Progress with Full Report to Follow.   Precautions   Precautions Fall Risk   Comments low back pain with paresthesia and radiculopathy   Vitals   O2 Delivery Device None - Room Air   Pain 0 - 10 Group   Therapist Pain Assessment During Activity;10  (resting pain 8/10, upper lumbar)   Prior Living Situation   Housing / Facility 1 Story Apartment / Condo  (2nd floor)   Steps Into Home 20   Rail Left Rail  (Steps into Home)   Equipment Owned None   Lives with - Patient's Self Care Capacity Child Less than 18 Years of Age  (son is 3 y.o.)   Comments could stay with her mom in a 1SH with 5 MILLA, L rail   Prior Level of Functional Mobility   Bed Mobility Independent   Transfer Status Independent   Ambulation Independent   Ambulation Distance community   Assistive Devices Used None   Stairs Independent   Comments drives, 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  PT reports her chair has a little bit of lumbar support   History of Falls   History of Falls Yes   Date of Last Fall   (Pt reports 2 falls last June, pt fell down onto her butt)   Cognition    Level of Consciousness Alert   Active ROM Upper Body   Active ROM Upper Body  WDL   Strength Upper Body   Upper Body Strength  WDL   Sensation Upper Body   Upper Extremity Sensation  WDL   Active ROM Lower Body    Active ROM Lower Body  WDL   Strength Lower Body   Lower Body Strength  X   Comments RLE grossly 3/5, LLE grossly 3+/5 with pain with all overpressures   Sensation Lower Body   Lower Extremity Sensation   X   Comments Pt reports circumferential numbness in B thighs and paresthesia B from knees -> feet   Lower Body Muscle Tone   Lower Body Muscle Tone  WDL   Coordination Upper Body   Coordination WDL   Coordination Lower Body    Coordination Lower Body  WDL   Other Treatments    Other Treatments Provided Pt reports constant pain ~L1-L2 region with B radiculopathy to B hips, better with rest, increases with sitting and standing.  Flexion increased radiculopathy to the knees with increased paresthesia in the feet, extension increased radiculopathy up to ~T7 along the paraspinals with increased paresthesia up to the knee.  Distraction did not relieve  pain.   Balance Assessment   Sitting Balance (Static) Fair   Sitting Balance (Dynamic) Fair -   Standing Balance (Static) Fair -   Standing Balance (Dynamic) Fair -   Weight Shift Sitting Fair   Weight Shift Standing Fair   Comments with FWW   Bed Mobility    Supine to Sit Standby Assist   Sit to Supine Standby Assist   Scooting Standby Assist   Rolling Standby Assist   Comments bed flat, no rail, log rolling   Gait Analysis   Gait Level Of Assist Standby Assist   Assistive Device Front Wheel Walker   Distance (Feet) 10   # of Times Distance was Traveled 2  (standing rest break)   Deviation Antalgic;Bradykinetic  (discontinuous steps, 1/2 foot L step length, increased UE WB)   Weight Bearing Status FWB BLEs   Functional Mobility   Sit to Stand Standby Assist   Bed, Chair, Wheelchair Transfer Standby Assist   Transfer Method Stand Step   Mobility with FWW   Activity Tolerance   Sitting Edge of Bed 5 min   Edema / Skin Assessment   Edema / Skin  Not Assessed   Short Term Goals    Short Term Goal # 1 Pt will perform supine < > sit SPV with bed flat, no rail, log rolling in 6 visits in order to set up for upright mobility   Short Term Goal # 2 Pt will perform sit < > stand SPV in 6 visits in order to prepare for ambulation   Short Term Goal # 3 Pt will ambulate 150' SPV /c LRAD in 6 visits in order to return home safely   Short Term Goal # 4 Pt will ascend/ descend 5 steps SPV in 6 visits in order to access his/her home   Education Group   Education Provided Role of Physical Therapist;Exercises - Supine;Transfer Status   Role of Physical Therapist  Patient Response Patient;Acceptance;Explanation;Action Demonstration   Exercises - Supine Patient Response Patient;Acceptance;Explanation;Verbal Demonstration;Reinforcement Needed  (pelvic tilts.  Pt also educated about icing 20 min on: 20 min off)   Transfer Status Patient Response Patient;Acceptance;Explanation;Action Demonstration;Reinforcement Needed   Additional Comments bed mobility- Patient;Acceptance;Explanation;Action Demonstration;Reinforcement Needed.   Physical Therapy Initial Treatment Plan    Treatment Plan  Bed Mobility;Equipment;Family / Caregiver Training;Gait Training;Manual Therapy;Neuro Re-Education / Balance;Self Care / Home Evaluation;Stair Training;Therapeutic Activities;Therapeutic Exercise   Treatment Frequency 4 Times per Week   Duration Until Therapy Goals Met   Problem List    Problems Pain;Impaired Bed Mobility;Impaired Transfers;Impaired Ambulation;Functional Strength Deficit;Decreased Activity Tolerance   Anticipated Discharge Equipment and Recommendations   DC Equipment Recommendations Front-Wheel Walker   Discharge Recommendations Recommend home health for continued physical therapy services   Interdisciplinary Plan of Care Collaboration   IDT Collaboration with  Nursing   Patient Position at End of Therapy In Bed;Call Light within Reach;Tray Table within Reach  (nurse in the room, ice on her back)   Collaboration Comments RN updated   Session Information   Date / Session Number  2/26- 1( 1/4, 3/4)

## 2025-02-26 NOTE — CARE PLAN
The patient is Watcher - Medium risk of patient condition declining or worsening    Shift Goals  Clinical Goals: Pain management, PT  Patient Goals: Pain control  Family Goals: Not present    Progress made toward(s) clinical / shift goals:       Problem: Pain - Standard  Goal: Alleviation of pain or a reduction in pain to the patient’s comfort goal  Outcome: Progressing  Note: Medicated with scheduled and PRN pain medications. Pt states medications are effective in decreasing pain. Breathing WDL, resting comfortably.     Problem: Knowledge Deficit - Standard  Goal: Patient and family/care givers will demonstrate understanding of plan of care, disease process/condition, diagnostic tests and medications  Outcome: Progressing  Note: A/Ox4, patient able to understand plan of care, all questions answered at this time.      Problem: Fall Risk  Goal: Patient will remain free from falls  Outcome: Progressing  Note: Patient remained free from falls throughout shift. Bed locked and in lowest position, call light and belongings within reach. Room free from clutter. Patient calls appropriately.        Patient is not progressing towards the following goals:

## 2025-02-26 NOTE — TELEPHONE ENCOUNTER
Caller Name: Luda (Nurse)   Call Back Number: N/A     How would the patient prefer to be contacted with a response: Phone call OK to leave a detailed message    Luda chaparro nurse from [facility] called, stated she needed  address and fax number, this patient is to  see  for a NP appt 03/18/2025.

## 2025-02-26 NOTE — DISCHARGE PLANNING
0901  KARLA spoke with Luda ([facility]) to advise pt has a scheduled appt to establish with PCP on 3/18.

## 2025-02-26 NOTE — DISCHARGE PLANNING
Case Management Discharge Planning    Admission Date: 2/23/2025  GMLOS:    ALOS: 0    6-Clicks ADL Score: 24  6-Clicks Mobility Score: 17  PT and/or OT Eval ordered: Yes  Post-acute Referrals Ordered: Yes  Post-acute Choice Obtained: Yes  Has referral(s) been sent to post-acute provider:  Yes      Anticipated Discharge Dispo: Discharge Disposition: D/T to home under A care in anticipation of covered skilled care (06)  Discharge Address: [address] [place] [address] [place], [address]    DME Needed: No    Action(s) Taken: Discussed in IDT rounds, pt continues to require IV pain medication, once pain controlled, plan for discharge. Pt accepted with Lauren NEVAREZ, PCP apt scheduled for 3/18.     Escalations Completed: None    Medically Clear: No    Next Steps: Pending pain control.     Barriers to Discharge: Medical clearance    Is the patient up for discharge tomorrow: No

## 2025-02-26 NOTE — PROGRESS NOTES
[facility] Medicine Daily Progress Note    Date of Service  2/26/2025    Chief Complaint  Ngoc Morales is a 24 y.o. female admitted 2/23/2025 with back pain.    Hospital Course  Ngoc is a 24 y.o. female with a PMH of L4-L5 disc herniation and Hx of lymphoma (in remission), who presented 2/23/2025 with symptoms of acute worsening low back pain.    Patient states that symptoms started after standing up from a squatting position to replace a filter. Seen with mother at bedside. Patient reports she was diagnosed with L4-L5 disc herniation several months prior to current admission, states this is in the setting of osteopenia from prior treatment for her lymphoma, which has been in remission. She is being followed by pain management Dr. Nguyen and [facility]. Reports she was previously able to perform day to day activities as pain was previously resolved, has had persistent low back pain with sciatica since Friday. Pain is sharp and radiates to bilateral hips and bilateral lower extremities, associated with numbness and tingling of BLE, nausea, as well as worsening urinary incontinence that began the evening prior to admission. Reports she was ambulating to the bathroom and experienced complete loss of urine, has never had episodes of urinary incontinence previously. Denies fever, chills, history of IV drug use.      In the ER, VSS. Labs unremarkable. MRI lumbar spine shows moderate-sized central disc protrusion with slight interval increase since prior exam. Received gabapentin, hydromorphone, ketamine, and ondansetron in the ER.     Patient seen and examined this a.m.  Says that she is having significant pain that oral pain medication is not managing.  Discussed with her that we will implement multimodal pain management.  This I discussed with her that have consulted neurosurgery and at this point they are recommending further imaging with MRI cervical and thoracic spine as findings on imaging would not explain  her symptoms at this point.  Discussed with her that if imaging comes back negative we are implementing symptom management and she will need to follow-up outpatient once managed with oral medications.    MRI of cervical and thoracic spine also came back negative for tumor/cord compression.    Interval Problem Update  Patient is still having significant pain that oral pain medication is not managing at this time.  No further episodes of urinary continence.  -Plan of care: Multimodal pain management with p.o. and IV medication.  MRI cervical and thoracic spine.  -Disposition: Patient to remain overnight as observation status for further management.    Patient was seen and examined at bedside.  I have personally reviewed and interpreted vitals, labs, and imaging.    2/25.  Afebrile.  Stable vitals.  On room air.  Denies fever, chills or chest pains, shortness of breath.  Patient complains of persistent back pain.  She is upset with the regimen and does not feel that she is progressing.  We notified that she does not need surgery she felt like she was being pushed out the door may have been given a reason for why her back hurts.  Had long discussion with patient with her mom on the phone.  Sweats methocarbamol to baclofen.  Increase gabapentin.  Switch oral oxycodone to oral Dilaudid.  Switch oral oxycodone to oral Dilaudid.  She also requested to be restarted on her oral Adderall and may be going through withdrawals  2/26.  Afebrile.  Stable vitals.  On room air.  Patient states the pain is better controlled on the regimen.  Discussed plan of care with patient and mom over the phone    I have discussed this patient's plan of care and discharge plan at IDT rounds today with Case Management, Nursing, Nursing leadership, and other members of the IDT team.    Consultants/Specialty  neurosurgery    Code Status  Full Code    Disposition  The patient is not medically cleared for discharge to home or a post-acute  facility.  Anticipate discharge to: home with organized home healthcare and close outpatient follow-up    I have placed the appropriate orders for post-discharge needs.    Review of Systems  Review of Systems   Constitutional:  Negative for chills, fever and malaise/fatigue.   HENT:  Negative for congestion and sore throat.    Eyes:  Negative for blurred vision and double vision.   Respiratory:  Negative for cough and shortness of breath.    Cardiovascular:  Negative for chest pain, palpitations and leg swelling.   Gastrointestinal:  Negative for abdominal pain, nausea and vomiting.   Genitourinary:  Negative for dysuria and hematuria.   Musculoskeletal:  Positive for back pain.   Skin:  Negative for itching and rash.   Neurological:  Positive for tingling. Negative for weakness.        Numbness and tingling BLE   Psychiatric/Behavioral:  Negative for depression and suicidal ideas.         Physical Exam  Temp:  [36.3 °C (97.3 °F)-36.9 °C (98.4 °F)] 36.3 °C (97.3 °F)  Pulse:  [74-92] 82  Resp:  [18] 18  BP: (105-123)/(60-73) 105/60  SpO2:  [93 %-96 %] 93 %    Physical Exam  Constitutional:       Appearance: Normal appearance.   HENT:      Head: Normocephalic and atraumatic.      Nose: Nose normal.      Mouth/Throat:      Mouth: Mucous membranes are moist.   Eyes:      Pupils: Pupils are equal, round, and reactive to light.   Cardiovascular:      Rate and Rhythm: Normal rate and regular rhythm.      Pulses: Normal pulses.      Heart sounds: Normal heart sounds.   Pulmonary:      Effort: Pulmonary effort is normal.      Breath sounds: Normal breath sounds.   Abdominal:      General: Bowel sounds are normal.      Palpations: Abdomen is soft.   Musculoskeletal:         General: Normal range of motion.      Cervical back: Normal range of motion.   Skin:     General: Skin is warm and dry.   Neurological:      General: No focal deficit present.      Mental Status: She is alert. Mental status is at baseline.   Psychiatric:          Mood and Affect: Mood normal.         Behavior: Behavior normal.         Fluids  No intake or output data in the 24 hours ending 02/26/25 0843       Laboratory  Recent Labs     02/23/25  1459 02/24/25  0141 02/26/25  0640   WBC 8.9 9.0 8.3   RBC 4.70 4.66 4.30   HEMOGLOBIN 14.0 14.2 13.0   HEMATOCRIT 43.4 43.7 39.6   MCV 92.3 93.8 92.1   MCH 29.8 30.5 30.2   MCHC 32.3 32.5 32.8   RDW 45.3 45.3 45.1   PLATELETCT 326 302 307   MPV 9.4 10.0 9.8     Recent Labs     02/23/25  1459 02/24/25  0141 02/26/25  0640   SODIUM 137 137 138   POTASSIUM 4.1 3.5* 4.1   CHLORIDE 105 102 107   CO2 19* 23 21   GLUCOSE 92 119* 101*   BUN 11 11 10   CREATININE 0.65 0.67 0.61   CALCIUM 8.7 9.2 8.5                   Imaging  CT-HEAD W/O   Final Result      No definite acute intracranial abnormality.               MR-THORACIC SPINE-WITH & W/O   Final Result      1.  Minor degenerative changes with multilevel tiny ventral extradural defects consistent with disc bulges or disc/osteophyte change at T2-T3, T5-T6, T6-T7, T8-T9. There is no cord impingement or central stenosis at any thoracic level.   2.  No myelopathic cord signal.      MR-CERVICAL SPINE-WITH & W/O   Final Result      1.  C5-6 mild left-sided inferior foraminal stenosis due to uncinate hypertrophy.   2.  C6-7 mild disc bulging. No central stenosis or foraminal stenosis.   3.  No myelopathic cord signal.      MR-LUMBAR SPINE-W/O   Final Result      1.  L4-5 moderate-sized central disc protrusion slightly favoring the left with small components of cephalad and caudad extrusion. Focal impression on the ventral thecal sac without central stenosis. Slight interval increase since prior exam. Borderline    left lateral recess stenosis. Borderline bilateral foraminal stenosis.   2.  L5-S1 mild broad-based central disc bulging. No central stenosis or significant foraminal stenosis.           Assessment/Plan  * Intractable low back pain- (present on admission)  Assessment &  Plan  2/26/2025  Secondary to L4-L5 disc herniation. Associated symptoms of urinary incontinence and BLE numbness and tingling. MRI lumbar spine shows moderate-sized central disc protrusion with slight interval increase since prior exam.  Multimodal pain management-scheduled IV Toradol, scheduled Robaxin, scheduled Tylenol, scheduled lidocaine patch, scheduled Medrol Dosepak, scheduled gabapentin, as needed IV and p.o. analgesics  Neurosurgery consulted at this point recommending MRI C-spine/T-spine. Findings of the MRI of the lumbar spine without causing symptoms per neurosurgery.  Postvoid residual 28 cc.  Patient not retaining at this time.    Allergic rhinitis  Assessment & Plan  2/26/2025  Diphenhydramine PRN    Alcohol use disorder  Assessment & Plan  2/26/2025  Drinks 1 drink daily, occasionally more than 1 drink daily but no more than 2-3 drinks daily.  Encouraged cessation    Hematuria  Assessment & Plan  2/26/2025  Incidental finding on UA. Not associated with LUTS.    History of B-cell lymphoma- (present on admission)  Assessment & Plan  2/26/2025  Dx 2017. In remission.  Follow-up with oncology Dr. Theodore as outpatient    Chronic bilateral low back pain with bilateral sciatica- (present on admission)  Assessment & Plan  2/26/2025  Follow-up with pain management Dr. Nguyen and neurology as outpatient         VTE prophylaxis: Lovenox    I have performed a physical exam and reviewed and updated ROS and Plan today (2/26/2025). In review of yesterday's note (2/25/2025), there are no changes except as documented above.    Greater than 50 minutes spent prepping to see patient (e.g. review of tests) obtaining and/or reviewing separately obtained history. Performing a medically appropriate examination and/ evaluation.  Counseling and educating the patient/family/caregiver.  Ordering medications, tests, or procedures.  Referring and communicating with other health care professionals.  Documenting clinical information  in EPIC.  Independently interpreting results and communicating results to patient/family/caregiver.  Care coordination.

## 2025-02-26 NOTE — CARE PLAN
The patient is Watcher - Medium risk of patient condition declining or worsening    Shift Goals  Clinical Goals: pain management, CT  Patient Goals: pain control, sleep  Family Goals: comfort and pain control    Progress made toward(s) clinical / shift goals:      Problem: Knowledge Deficit - [place]  Goal: Patient and family/care givers will demonstrate understanding of plan of care, disease process/condition, diagnostic tests and medications  Outcome: Progressing     Problem: Fall Risk  Goal: Patient will remain free from falls  Outcome: Progressing       Patient is not progressing towards the following goals:      Problem: Pain - [place]  Goal: Alleviation of pain or a reduction in pain to the patient’s comfort goal  Outcome: Not Progressing

## 2025-02-26 NOTE — PROGRESS NOTES
Neurosurgery Progress Note    Subjective:  States back pain, leg numbness and weakness  PVR yesterday 10    Exam:  BLE str intact with giveaway, underlying str intact, improves with coaching at least 4/5 throughout.     BP  Min: 105/60  Max: 123/73  Pulse  Av.8  Min: 74  Max: 92  Resp  Av  Min: 18  Max: 18  Temp  Av.7 °C (98 °F)  Min: 36.3 °C (97.3 °F)  Max: 36.9 °C (98.4 °F)  SpO2  Av.5 %  Min: 93 %  Max: 96 %    No data recorded    Recent Labs     25  1459 25  0141 25  0640   WBC 8.9 9.0 8.3   RBC 4.70 4.66 4.30   HEMOGLOBIN 14.0 14.2 13.0   HEMATOCRIT 43.4 43.7 39.6   MCV 92.3 93.8 92.1   MCH 29.8 30.5 30.2   MCHC 32.3 32.5 32.8   RDW 45.3 45.3 45.1   PLATELETCT 326 302 307   MPV 9.4 10.0 9.8     Recent Labs     25  1459 25  0141 25  0640   SODIUM 137 137 138   POTASSIUM 4.1 3.5* 4.1   CHLORIDE 105 102 107   CO2 19* 23 21   GLUCOSE 92 119* 101*   BUN 11 11 10   CREATININE 0.65 0.67 0.61   CALCIUM 8.7 9.2 8.5               Intake/Output                         25 07 - 25 0659 25 07 - 25 0659      Total  Total                 Intake    Total Intake -- -- -- -- -- --       Output    Urine  --  -- --  --  -- --    Number of Times Voided -- 1 x 1 x -- -- --    Total Output -- -- -- -- -- --       Net I/O     -- -- -- -- -- --            No intake or output data in the 24 hours ending 25 1038    $ Bladder Scan Results (mL): 35     HYDROmorphone  4 mg Q3HRS PRN    HYDROmorphone  2 mg Q3HRS PRN    gabapentin  300 mg TID    baclofen  10 mg TID    NS   Continuous    amphetamine-dextroamphetamine  20 mg DAILY    HYDROmorphone  0.5 mg Q3HRS PRN    methylPREDNISolone  4 mg 4X/DAY WITH MEALS + NIGHTLY    Followed by    [START ON 2025] methylPREDNISolone  4 mg TID WITH MEALS    Followed by    [START ON 2025] methylPREDNISolone  4 mg BID WITH MEALS    ketorolac  15 mg Q6HRS    lidocaine  1  Patch Q24HR    enoxaparin (LOVENOX) injection  40 mg DAILY AT 1800    senna-docusate  2 Tablet Q EVENING    And    polyethylene glycol/lytes  1 Packet QDAY PRN    Pharmacy Consult Request  1 Each PHARMACY TO DOSE    acetaminophen  1,000 mg Q6HRS    Followed by    [START ON 2/28/2025] acetaminophen  1,000 mg Q6HRS PRN    diphenhydrAMINE  25 mg Q6HRS PRN    ondansetron  4 mg Q4HRS PRN    lamoTRIgine  300 mg DAILY    ALPRAZolam  0.25 mg QDAY PRN       Assessment and Plan:  Hospital day #3 LBP, LE numbness  Prophylactic anticoagulation: yes         Start date/time: tbd     See DV note. C/T WNL, lumbar disc without stenosis.  Rec Neurology/urology

## 2025-02-27 LAB
ALBUMIN SERPL BCP-MCNC: 4 G/DL (ref 3.2–4.9)
APPEARANCE UR: CLEAR
BACTERIA #/AREA URNS HPF: NORMAL /HPF
BILIRUB UR QL STRIP.AUTO: NEGATIVE
BUN SERPL-MCNC: 8 MG/DL (ref 8–22)
CALCIUM ALBUM COR SERPL-MCNC: 8.5 MG/DL (ref 8.5–10.5)
CALCIUM SERPL-MCNC: 8.5 MG/DL (ref 8.5–10.5)
CASTS URNS QL MICRO: NORMAL /LPF (ref 0–2)
CHLORIDE SERPL-SCNC: 106 MMOL/L (ref 96–112)
CO2 SERPL-SCNC: 23 MMOL/L (ref 20–33)
COLOR UR: YELLOW
CREAT SERPL-MCNC: 0.58 MG/DL (ref 0.5–1.4)
EPITHELIAL CELLS 1715: NORMAL /HPF (ref 0–5)
ERYTHROCYTE [DISTWIDTH] IN BLOOD BY AUTOMATED COUNT: 45.2 FL (ref 35.9–50)
GFR SERPLBLD CREATININE-BSD FMLA CKD-EPI: 129 ML/MIN/1.73 M 2
GLUCOSE SERPL-MCNC: 82 MG/DL (ref 65–99)
GLUCOSE UR STRIP.AUTO-MCNC: NEGATIVE MG/DL
HCT VFR BLD AUTO: 40.4 % (ref 37–47)
HGB BLD-MCNC: 13 G/DL (ref 12–16)
KETONES UR STRIP.AUTO-MCNC: 15 MG/DL
LEUKOCYTE ESTERASE UR QL STRIP.AUTO: ABNORMAL
MAGNESIUM SERPL-MCNC: 1.9 MG/DL (ref 1.5–2.5)
MCH RBC QN AUTO: 30 PG (ref 27–33)
MCHC RBC AUTO-ENTMCNC: 32.2 G/DL (ref 32.2–35.5)
MCV RBC AUTO: 93.3 FL (ref 81.4–97.8)
MICRO URNS: ABNORMAL
NITRITE UR QL STRIP.AUTO: NEGATIVE
PH UR STRIP.AUTO: 7 [PH] (ref 5–8)
PHOSPHATE SERPL-MCNC: 3.2 MG/DL (ref 2.5–4.5)
PLATELET # BLD AUTO: 278 K/UL (ref 164–446)
PMV BLD AUTO: 9.8 FL (ref 9–12.9)
POTASSIUM SERPL-SCNC: 3.9 MMOL/L (ref 3.6–5.5)
PROT UR QL STRIP: NEGATIVE MG/DL
RBC # BLD AUTO: 4.33 M/UL (ref 4.2–5.4)
RBC # URNS HPF: NORMAL /HPF (ref 0–2)
RBC UR QL AUTO: NEGATIVE
SODIUM SERPL-SCNC: 139 MMOL/L (ref 135–145)
SP GR UR STRIP.AUTO: 1.01
UROBILINOGEN UR STRIP.AUTO-MCNC: 1 EU/DL
WBC # BLD AUTO: 8.5 K/UL (ref 4.8–10.8)
WBC #/AREA URNS HPF: NORMAL /HPF

## 2025-02-27 PROCEDURE — 700111 HCHG RX REV CODE 636 W/ 250 OVERRIDE (IP): Mod: UD

## 2025-02-27 PROCEDURE — 97530 THERAPEUTIC ACTIVITIES: CPT

## 2025-02-27 PROCEDURE — 85027 COMPLETE CBC AUTOMATED: CPT

## 2025-02-27 PROCEDURE — 700111 HCHG RX REV CODE 636 W/ 250 OVERRIDE (IP): Mod: JZ | Performed by: INTERNAL MEDICINE

## 2025-02-27 PROCEDURE — A9270 NON-COVERED ITEM OR SERVICE: HCPCS | Mod: UD

## 2025-02-27 PROCEDURE — 700105 HCHG RX REV CODE 258: Mod: UD | Performed by: INTERNAL MEDICINE

## 2025-02-27 PROCEDURE — 97535 SELF CARE MNGMENT TRAINING: CPT

## 2025-02-27 PROCEDURE — 700111 HCHG RX REV CODE 636 W/ 250 OVERRIDE (IP): Mod: JZ,UD

## 2025-02-27 PROCEDURE — 700102 HCHG RX REV CODE 250 W/ 637 OVERRIDE(OP): Mod: UD

## 2025-02-27 PROCEDURE — 700101 HCHG RX REV CODE 250: Mod: UD | Performed by: EMERGENCY MEDICINE

## 2025-02-27 PROCEDURE — 80069 RENAL FUNCTION PANEL: CPT

## 2025-02-27 PROCEDURE — 36415 COLL VENOUS BLD VENIPUNCTURE: CPT

## 2025-02-27 PROCEDURE — 97116 GAIT TRAINING THERAPY: CPT

## 2025-02-27 PROCEDURE — A9270 NON-COVERED ITEM OR SERVICE: HCPCS | Mod: UD | Performed by: INTERNAL MEDICINE

## 2025-02-27 PROCEDURE — 83735 ASSAY OF MAGNESIUM: CPT

## 2025-02-27 PROCEDURE — 99233 SBSQ HOSP IP/OBS HIGH 50: CPT | Performed by: INTERNAL MEDICINE

## 2025-02-27 PROCEDURE — 700102 HCHG RX REV CODE 250 W/ 637 OVERRIDE(OP): Mod: UD | Performed by: INTERNAL MEDICINE

## 2025-02-27 PROCEDURE — G0378 HOSPITAL OBSERVATION PER HR: HCPCS

## 2025-02-27 RX ORDER — LANOLIN ALCOHOL/MO/W.PET/CERES
400 CREAM (GRAM) TOPICAL 2 TIMES DAILY
Status: COMPLETED | OUTPATIENT
Start: 2025-02-27 | End: 2025-02-27

## 2025-02-27 RX ORDER — ONDANSETRON 4 MG/1
4 TABLET, ORALLY DISINTEGRATING ORAL EVERY 4 HOURS PRN
Status: DISCONTINUED | OUTPATIENT
Start: 2025-02-27 | End: 2025-03-04 | Stop reason: HOSPADM

## 2025-02-27 RX ORDER — HYDROMORPHONE HYDROCHLORIDE 4 MG/1
6 TABLET ORAL
Refills: 0 | Status: DISCONTINUED | OUTPATIENT
Start: 2025-02-27 | End: 2025-03-01

## 2025-02-27 RX ORDER — HYDROMORPHONE HYDROCHLORIDE 4 MG/1
4 TABLET ORAL
Refills: 0 | Status: DISCONTINUED | OUTPATIENT
Start: 2025-02-27 | End: 2025-03-01

## 2025-02-27 RX ORDER — POTASSIUM CHLORIDE 1500 MG/1
20 TABLET, EXTENDED RELEASE ORAL ONCE
Status: COMPLETED | OUTPATIENT
Start: 2025-02-27 | End: 2025-02-27

## 2025-02-27 RX ADMIN — HYDROMORPHONE HYDROCHLORIDE 6 MG: 4 TABLET ORAL at 08:18

## 2025-02-27 RX ADMIN — HYDROMORPHONE HYDROCHLORIDE 6 MG: 4 TABLET ORAL at 16:57

## 2025-02-27 RX ADMIN — HYDROMORPHONE HYDROCHLORIDE 0.5 MG: 1 INJECTION, SOLUTION INTRAMUSCULAR; INTRAVENOUS; SUBCUTANEOUS at 21:21

## 2025-02-27 RX ADMIN — ACETAMINOPHEN 1000 MG: 500 TABLET ORAL at 00:26

## 2025-02-27 RX ADMIN — KETOROLAC TROMETHAMINE 15 MG: 15 INJECTION, SOLUTION INTRAMUSCULAR; INTRAVENOUS at 11:19

## 2025-02-27 RX ADMIN — ACETAMINOPHEN 1000 MG: 500 TABLET ORAL at 11:20

## 2025-02-27 RX ADMIN — HYDROMORPHONE HYDROCHLORIDE 6 MG: 4 TABLET ORAL at 14:37

## 2025-02-27 RX ADMIN — HYDROMORPHONE HYDROCHLORIDE 6 MG: 4 TABLET ORAL at 20:05

## 2025-02-27 RX ADMIN — BACLOFEN 10 MG: 10 TABLET ORAL at 11:19

## 2025-02-27 RX ADMIN — DEXTROAMPHETAMINE SACCHARATE, AMPHETAMINE ASPARTATE, DEXTROAMPHETAMINE SULFATE, AMPHETAMINE SULFATE TABLETS, 10 MG,CLL 20 MG: 2.5; 2.5; 2.5; 2.5 TABLET ORAL at 08:18

## 2025-02-27 RX ADMIN — METHYLPREDNISOLONE 4 MG: 4 TABLET ORAL at 16:58

## 2025-02-27 RX ADMIN — LIDOCAINE 1 PATCH: 4 PATCH TOPICAL at 19:57

## 2025-02-27 RX ADMIN — Medication 400 MG: at 11:19

## 2025-02-27 RX ADMIN — POLYETHYLENE GLYCOL 3350 1 PACKET: 17 POWDER, FOR SOLUTION ORAL at 13:33

## 2025-02-27 RX ADMIN — METHYLPREDNISOLONE 4 MG: 4 TABLET ORAL at 08:17

## 2025-02-27 RX ADMIN — HYDROMORPHONE HYDROCHLORIDE 0.5 MG: 1 INJECTION, SOLUTION INTRAMUSCULAR; INTRAVENOUS; SUBCUTANEOUS at 09:26

## 2025-02-27 RX ADMIN — SODIUM CHLORIDE: 9 INJECTION, SOLUTION INTRAVENOUS at 13:26

## 2025-02-27 RX ADMIN — HYDROMORPHONE HYDROCHLORIDE 6 MG: 4 TABLET ORAL at 00:26

## 2025-02-27 RX ADMIN — METHYLPREDNISOLONE 4 MG: 4 TABLET ORAL at 11:20

## 2025-02-27 RX ADMIN — BACLOFEN 10 MG: 10 TABLET ORAL at 05:13

## 2025-02-27 RX ADMIN — SENNOSIDES AND DOCUSATE SODIUM 2 TABLET: 50; 8.6 TABLET ORAL at 16:56

## 2025-02-27 RX ADMIN — KETOROLAC TROMETHAMINE 15 MG: 15 INJECTION, SOLUTION INTRAMUSCULAR; INTRAVENOUS at 05:13

## 2025-02-27 RX ADMIN — ERGOCALCIFEROL 50000 UNITS: 1.25 CAPSULE ORAL at 08:17

## 2025-02-27 RX ADMIN — KETOROLAC TROMETHAMINE 15 MG: 15 INJECTION, SOLUTION INTRAMUSCULAR; INTRAVENOUS at 00:26

## 2025-02-27 RX ADMIN — ONDANSETRON 4 MG: 2 INJECTION INTRAMUSCULAR; INTRAVENOUS at 21:23

## 2025-02-27 RX ADMIN — PROCHLORPERAZINE EDISYLATE 10 MG: 5 INJECTION INTRAMUSCULAR; INTRAVENOUS at 13:33

## 2025-02-27 RX ADMIN — HYDROMORPHONE HYDROCHLORIDE 0.5 MG: 1 INJECTION, SOLUTION INTRAMUSCULAR; INTRAVENOUS; SUBCUTANEOUS at 05:14

## 2025-02-27 RX ADMIN — GABAPENTIN 300 MG: 300 CAPSULE ORAL at 16:56

## 2025-02-27 RX ADMIN — LAMOTRIGINE 300 MG: 100 TABLET ORAL at 05:13

## 2025-02-27 RX ADMIN — GABAPENTIN 300 MG: 300 CAPSULE ORAL at 11:19

## 2025-02-27 RX ADMIN — GABAPENTIN 300 MG: 300 CAPSULE ORAL at 05:13

## 2025-02-27 RX ADMIN — ACETAMINOPHEN 1000 MG: 500 TABLET ORAL at 05:13

## 2025-02-27 RX ADMIN — HYDROMORPHONE HYDROCHLORIDE 0.5 MG: 1 INJECTION, SOLUTION INTRAMUSCULAR; INTRAVENOUS; SUBCUTANEOUS at 18:42

## 2025-02-27 RX ADMIN — HYDROMORPHONE HYDROCHLORIDE 6 MG: 4 TABLET ORAL at 03:40

## 2025-02-27 RX ADMIN — POTASSIUM CHLORIDE 20 MEQ: 1500 TABLET, EXTENDED RELEASE ORAL at 11:19

## 2025-02-27 RX ADMIN — BACLOFEN 10 MG: 10 TABLET ORAL at 16:56

## 2025-02-27 RX ADMIN — ACETAMINOPHEN 1000 MG: 500 TABLET ORAL at 16:56

## 2025-02-27 RX ADMIN — Medication 400 MG: at 16:56

## 2025-02-27 ASSESSMENT — ENCOUNTER SYMPTOMS
PALPITATIONS: 0
ABDOMINAL PAIN: 0
SORE THROAT: 0
BACK PAIN: 1
COUGH: 0
BLURRED VISION: 0
VOMITING: 0
TINGLING: 1
SHORTNESS OF BREATH: 0
DOUBLE VISION: 0
NAUSEA: 0
CHILLS: 0
FEVER: 0
DEPRESSION: 0
WEAKNESS: 0

## 2025-02-27 ASSESSMENT — GAIT ASSESSMENTS
DISTANCE (FEET): 10
GAIT LEVEL OF ASSIST: STANDBY ASSIST
DEVIATION: ANTALGIC;BRADYKINETIC
ASSISTIVE DEVICE: FRONT WHEEL WALKER

## 2025-02-27 ASSESSMENT — COGNITIVE AND FUNCTIONAL STATUS - GENERAL
CLIMB 3 TO 5 STEPS WITH RAILING: A LOT
STANDING UP FROM CHAIR USING ARMS: A LITTLE
MOBILITY SCORE: 17
CLIMB 3 TO 5 STEPS WITH RAILING: A LITTLE
STANDING UP FROM CHAIR USING ARMS: A LITTLE
SUGGESTED CMS G CODE MODIFIER MOBILITY: CK
SUGGESTED CMS G CODE MODIFIER DAILY ACTIVITY: CH
TURNING FROM BACK TO SIDE WHILE IN FLAT BAD: A LITTLE
WALKING IN HOSPITAL ROOM: A LITTLE
MOVING FROM LYING ON BACK TO SITTING ON SIDE OF FLAT BED: A LITTLE
MOVING TO AND FROM BED TO CHAIR: A LITTLE
MOVING TO AND FROM BED TO CHAIR: A LITTLE
SUGGESTED CMS G CODE MODIFIER MOBILITY: CK
DAILY ACTIVITIY SCORE: 24
MOBILITY SCORE: 18
WALKING IN HOSPITAL ROOM: A LITTLE
TURNING FROM BACK TO SIDE WHILE IN FLAT BAD: A LITTLE
MOVING FROM LYING ON BACK TO SITTING ON SIDE OF FLAT BED: A LITTLE

## 2025-02-27 ASSESSMENT — PAIN DESCRIPTION - PAIN TYPE
TYPE: ACUTE PAIN
TYPE: ACUTE PAIN;CHRONIC PAIN
TYPE: ACUTE PAIN
TYPE: CHRONIC PAIN
TYPE: ACUTE PAIN
TYPE: ACUTE PAIN

## 2025-02-27 NOTE — CARE PLAN
The patient is Watcher - Medium risk of patient condition declining or worsening    Shift Goals  Clinical Goals: pain control, sleep  Patient Goals: pain control, sleep  Family Goals: Not present    Progress made toward(s) clinical / shift goals:      Problem: Knowledge Deficit - [place]  Goal: Patient and family/care givers will demonstrate understanding of plan of care, disease process/condition, diagnostic tests and medications  Outcome: Progressing     Problem: Fall Risk  Goal: Patient will remain free from falls  Outcome: Progressing       Patient is not progressing towards the following goals:      Problem: Pain - [place]  Goal: Alleviation of pain or a reduction in pain to the patient’s comfort goal  Outcome: Not Progressing

## 2025-02-27 NOTE — CARE PLAN
The patient is Watcher - Medium risk of patient condition declining or worsening    Shift Goals  Clinical Goals: Pain management, decrease IV pain meds  Patient Goals: Pain control  Family Goals: Not present    Progress made toward(s) clinical / shift goals:       Problem: Pain - [place]  Goal: Alleviation of pain or a reduction in pain to the patient’s comfort goal  Outcome: Progressing  Note: Medicated with PRN pain medications. Pt states medications are effective in decreasing pain. Breathing WDL, resting comfortably.     Problem: Knowledge Deficit - [place]  Goal: Patient and family/care givers will demonstrate understanding of plan of care, disease process/condition, diagnostic tests and medications  Outcome: Progressing  Note: A/Ox4, patient able to understand plan of care, all questions answered at this time.      Problem: Fall Risk  Goal: Patient will remain free from falls  Outcome: Progressing  Note: Patient remained free from falls throughout shift. Bed locked and in lowest position, call light and belongings within reach. Room free from clutter. Patient calls appropriately.        Patient is not progressing towards the following goals:

## 2025-02-27 NOTE — DISCHARGE PLANNING
Case Management Discharge Planning    Admission Date: 2/23/2025  GMLOS:    ALOS: 0    6-Clicks ADL Score: 24  6-Clicks Mobility Score: 17  PT and/or OT Eval ordered: Yes  Post-acute Referrals Ordered: Yes  Post-acute Choice Obtained: Yes  Has referral(s) been sent to post-acute provider:  Yes      Anticipated Discharge Dispo: Discharge Disposition: D/T to home under [facility] care in anticipation of covered skilled care (06)  Discharge Address: [address] [place] [address] [place], [id number]    DME Needed: Yes    DME Ordered: Yes    Action(s) Taken: Discussed during IDT rounds, Patient continues to receive IV dilaudid. Patient is accepted with [facility] . FWW ordered through traction. Patient will need to be off IV pain medications for discharge. Patient has a PCP appointment on 3/18.     Escalations Completed: None    Medically Clear: No    Next Steps: Pending pain control and oral regiment.     Barriers to Discharge: Medical clearance    Is the patient up for discharge tomorrow: Yes    Is transport arranged for discharge disposition: Yes

## 2025-02-27 NOTE — PROGRESS NOTES
[facility] Medicine Daily Progress Note    Date of Service  2/27/2025    Chief Complaint  Ngoc Morales is a 24 y.o. female admitted 2/23/2025 with back pain.    Hospital Course  Ngoc is a 24 y.o. female with a PMH of L4-L5 disc herniation and Hx of lymphoma (in remission), who presented 2/23/2025 with symptoms of acute worsening low back pain.    Patient states that symptoms started after standing up from a squatting position to replace a filter. Seen with mother at bedside. Patient reports she was diagnosed with L4-L5 disc herniation several months prior to current admission, states this is in the setting of osteopenia from prior treatment for her lymphoma, which has been in remission. She is being followed by pain management Dr. Nguyen and [facility]. Reports she was previously able to perform day to day activities as pain was previously resolved, has had persistent low back pain with sciatica since Friday. Pain is sharp and radiates to bilateral hips and bilateral lower extremities, associated with numbness and tingling of BLE, nausea, as well as worsening urinary incontinence that began the evening prior to admission. Reports she was ambulating to the bathroom and experienced complete loss of urine, has never had episodes of urinary incontinence previously. Denies fever, chills, history of IV drug use.      In the ER, VSS. Labs unremarkable. MRI lumbar spine shows moderate-sized central disc protrusion with slight interval increase since prior exam. Received gabapentin, hydromorphone, ketamine, and ondansetron in the ER.     Patient was started on multimodal pain regimen.  I consulted neurosurgery    MRI of cervical and thoracic spine also came back negative for tumor/cord compression.  Neurosurgery recommended nonoperative management  Patient was initially was having some urinary retention but this resolved.  Her postvoid residual has been monitored and it was slow.    Interval Problem Update  Patient  is still having significant pain that oral pain medication is not managing at this time.  No further episodes of urinary continence.  -Plan of care: Multimodal pain management with p.o. and IV medication.  MRI cervical and thoracic spine.  -Disposition: Patient to remain overnight as observation status for further management.    Patient was seen and examined at bedside.  I have personally reviewed and interpreted vitals, labs, and imaging.    2/25.  Afebrile.  Stable vitals.  On room air.  Denies fever, chills or chest pains, shortness of breath.  Patient complains of persistent back pain.  She is upset with the regimen and does not feel that she is progressing.  We notified that she does not need surgery she felt like she was being pushed out the door may have been given a reason for why her back hurts.  Had long discussion with patient with her mom on the phone.  Sweats methocarbamol to baclofen.  Increase gabapentin.  Switch oral oxycodone to oral Dilaudid.  Switch oral oxycodone to oral Dilaudid.  She also requested to be restarted on her oral Adderall and may be going through withdrawals  2/26.  Afebrile.  Stable vitals.  On room air.  Patient states the pain is better controlled on the regimen.  Discussed plan of care with patient and mom over the phone  2/27.  Afebrile.  Stable vitals.  On room air.  Denies fever, chills, chest pain, shortness of breath.  His back pain is improved from yesterday with new regimen but this morning feels like the oral Dilaudid did not last as long as yesterday.  With complaints of neck pain in addition to back pain.  Apparently urology was also planning for kidney scan.  Ordered renal ultrasound.  Counseled about prior urinalysis.  Ordered ESR and CRP inflammatory markers.  Plan of care discussed with patient and mother on the phone.    I have discussed this patient's plan of care and discharge plan at IDT rounds today with Case Management, Nursing, Nursing leadership, and other  members of the IDT team.    Consultants/Specialty  neurosurgery    Code Status  Full Code    Disposition  The patient is not medically cleared for discharge to home or a post-acute facility.  Anticipate discharge to: home with organized home healthcare and close outpatient follow-up    I have placed the appropriate orders for post-discharge needs.    Review of Systems  Review of Systems   Constitutional:  Negative for chills, fever and malaise/fatigue.   HENT:  Negative for congestion and sore throat.    Eyes:  Negative for blurred vision and double vision.   Respiratory:  Negative for cough and shortness of breath.    Cardiovascular:  Negative for chest pain, palpitations and leg swelling.   Gastrointestinal:  Negative for abdominal pain, nausea and vomiting.   Genitourinary:  Negative for dysuria and hematuria.   Musculoskeletal:  Positive for back pain.   Skin:  Negative for itching and rash.   Neurological:  Positive for tingling. Negative for weakness.        Numbness and tingling BLE   Psychiatric/Behavioral:  Negative for depression and suicidal ideas.         Physical Exam  Temp:  [36.1 °C (96.9 °F)-36.6 °C (97.8 °F)] 36.1 °C (97 °F)  Pulse:  [78-87] 78  Resp:  [16-19] 19  BP: (114-125)/(62-78) 114/62  SpO2:  [92 %-98 %] 96 %    Physical Exam  Constitutional:       Appearance: Normal appearance.   HENT:      Head: Normocephalic and atraumatic.      Nose: Nose normal.      Mouth/Throat:      Mouth: Mucous membranes are moist.   Eyes:      Pupils: Pupils are equal, round, and reactive to light.   Cardiovascular:      Rate and Rhythm: Normal rate and regular rhythm.      Pulses: Normal pulses.      Heart sounds: Normal heart sounds.   Pulmonary:      Effort: Pulmonary effort is normal.      Breath sounds: Normal breath sounds.   Abdominal:      General: Bowel sounds are normal.      Palpations: Abdomen is soft.   Musculoskeletal:         General: Normal range of motion.      Cervical back: Normal range of  motion.   Skin:     General: Skin is warm and dry.   Neurological:      General: No focal deficit present.      Mental Status: She is alert. Mental status is at baseline.   Psychiatric:         Mood and Affect: Mood normal.         Behavior: Behavior normal.         Fluids    Intake/Output Summary (Last 24 hours) at 2/27/2025 1004  Last data filed at 2/26/2025 1100  Gross per 24 hour   Intake 240 ml   Output --   Net 240 ml          Laboratory  Recent Labs     02/26/25  0640 02/27/25  0828   WBC 8.3 8.5   RBC 4.30 4.33   HEMOGLOBIN 13.0 13.0   HEMATOCRIT 39.6 40.4   MCV 92.1 93.3   MCH 30.2 30.0   MCHC 32.8 32.2   RDW 45.1 45.2   PLATELETCT 307 278   MPV 9.8 9.8     Recent Labs     02/26/25  0640 02/27/25  0828   SODIUM 138 139   POTASSIUM 4.1 3.9   CHLORIDE 107 106   CO2 21 23   GLUCOSE 101* 82   BUN 10 8   CREATININE 0.61 0.58   CALCIUM 8.5 8.5                   Imaging  CT-HEAD W/O   Final Result      No definite acute intracranial abnormality.               MR-THORACIC SPINE-WITH & W/O   Final Result      1.  Minor degenerative changes with multilevel tiny ventral extradural defects consistent with disc bulges or disc/osteophyte change at T2-T3, T5-T6, T6-T7, T8-T9. There is no cord impingement or central stenosis at any thoracic level.   2.  No myelopathic cord signal.      MR-CERVICAL SPINE-WITH & W/O   Final Result      1.  C5-6 mild left-sided inferior foraminal stenosis due to uncinate hypertrophy.   2.  C6-7 mild disc bulging. No central stenosis or foraminal stenosis.   3.  No myelopathic cord signal.      MR-LUMBAR SPINE-W/O   Final Result      1.  L4-5 moderate-sized central disc protrusion slightly favoring the left with small components of cephalad and caudad extrusion. Focal impression on the ventral thecal sac without central stenosis. Slight interval increase since prior exam. Borderline    left lateral recess stenosis. Borderline bilateral foraminal stenosis.   2.  L5-S1 mild broad-based central  disc bulging. No central stenosis or significant foraminal stenosis.           Assessment/Plan  * Intractable low back pain- (present on admission)  Assessment & Plan  2/27/2025  Secondary to L4-L5 disc herniation. Associated symptoms of urinary incontinence and BLE numbness and tingling. MRI lumbar spine shows moderate-sized central disc protrusion with slight interval increase since prior exam.  Multimodal pain management-scheduled IV Toradol, scheduled Robaxin, scheduled Tylenol, scheduled lidocaine patch, scheduled Medrol Dosepak, scheduled gabapentin, as needed IV and p.o. analgesics  Neurosurgery consulted at this point recommending MRI C-spine/T-spine. Findings of the MRI of the lumbar spine without causing symptoms per neurosurgery.  Postvoid residual 28 cc.  Patient not retaining at this time.    Allergic rhinitis  Assessment & Plan  2/27/2025  Diphenhydramine PRN    Alcohol use disorder  Assessment & Plan  2/27/2025  Drinks 1 drink daily, occasionally more than 1 drink daily but no more than 2-3 drinks daily.  Encouraged cessation    Hematuria  Assessment & Plan  2/27/2025  Incidental finding on UA. Not associated with LUTS.    History of B-cell lymphoma- (present on admission)  Assessment & Plan  2/27/2025  Dx 2017. In remission.  Follow-up with oncology Dr. Theodore as outpatient    Chronic bilateral low back pain with bilateral sciatica- (present on admission)  Assessment & Plan  2/27/2025  Follow-up with pain management Dr. Nguyen and neurology as outpatient         VTE prophylaxis: Lovenox    I have performed a physical exam and reviewed and updated ROS and Plan today (2/27/2025). In review of yesterday's note (2/26/2025), there are no changes except as documented above.    Greater than 51 minutes spent prepping to see patient (e.g. review of tests) obtaining and/or reviewing separately obtained history. Performing a medically appropriate examination and/ evaluation.  Counseling and educating the  patient/family/caregiver.  Ordering medications, tests, or procedures.  Referring and communicating with other health care professionals.  Documenting clinical information in EPIC.  Independently interpreting results and communicating results to patient/family/caregiver.  Care coordination.

## 2025-02-27 NOTE — THERAPY
Physical Therapy   Daily Treatment     Patient Name: Ngoc Morales  Age:  24 y.o., Sex:  female  Medical Record #: [id number]  Today's Date: 2/27/2025     Precautions  Precautions: Fall Risk  Comments: low back pain with paresthesia and radiculopathy    Assessment    Pt was agreeable to PT session /p pre-medication.  She mobilize with EOB with SBA with education reinforcement for log rolling.  She ambulated 10' with FWW SBA and completed 4 steps CGA with 1 rail going sideways.  Pt and mom were educated on healing and activity progression.  Pt is most limited by pain.  She was encouraged to use the walker at all times, and log rolling to get in/out of bed to decrease work on the back and help facilitate healing. PT will continue to follow.  Recommend home health PT services.       Plan    Treatment Plan Status: (P) Continue Current Treatment Plan  Type of Treatment: Bed Mobility, Equipment, Family / Caregiver Training, Gait Training, Manual Therapy, Neuro Re-Education / Balance, Self Care / Home Evaluation, Stair Training, Therapeutic Activities, Therapeutic Exercise  Treatment Frequency: 4 Times per Week  Treatment Duration: Until Therapy Goals Met    DC Equipment Recommendations: (P) Front-Wheel Walker  Discharge Recommendations: (P) Recommend home health for continued physical therapy services      Subjective    Pt and mom expressed concern about abnormal lab value seen in MyChart and concern for infection.  Pt and mom directed to speak with the physician and concerns passed on to the charge nurse.      Objective       02/27/25 1107   Precautions   Precautions Fall Risk   Comments low back pain with paresthesia and radiculopathy   Vitals   O2 Delivery Device None - Room Air   Pain 0 - 10 Group   Therapist Pain Assessment During Activity  (low back pain- not rated)   Cognition    Cognition / Consciousness WDL   Level of Consciousness Alert   Balance   Sitting Balance (Static) Fair   Sitting Balance (Dynamic)  Fair -   Standing Balance (Static) Fair -   Standing Balance (Dynamic) Fair -   Weight Shift Sitting Fair   Weight Shift Standing Fair   Comments with FWW   Bed Mobility    Supine to Sit Standby Assist   Sit to Supine Standby Assist   Scooting Standby Assist   Rolling Standby Assist   Skilled Intervention Sequencing;Verbal Cuing   Comments HOB partially elevated, rail, VCs for log roll   Gait Analysis   Gait Level Of Assist Standby Assist   Assistive Device Front Wheel Walker   Distance (Feet) 10   # of Times Distance was Traveled 1   Deviation Antalgic;Bradykinetic   # of Stairs Climbed 4   Level of Assist with Stairs Contact Guard Assist   Weight Bearing Status FWB BLEs   Skilled Intervention Verbal Cuing;Sequencing   Functional Mobility   Sit to Stand Standby Assist   Bed, Chair, Wheelchair Transfer Standby Assist   Transfer Method Stand Step   Mobility with FWW   Short Term Goals    Short Term Goal # 1 Pt will perform supine < > sit SPV with bed flat, no rail, log rolling in 6 visits in order to set up for upright mobility   Goal Outcome # 1 goal not met   Short Term Goal # 2 Pt will perform sit < > stand SPV in 6 visits in order to prepare for ambulation   Goal Outcome # 2 Goal not met   Short Term Goal # 3 Pt will ambulate 150' SPV /c LRAD in 6 visits in order to return home safely   Goal Outcome # 3 Goal not met   Short Term Goal # 4 Pt will ascend/ descend 5 steps SPV in 6 visits in order to access his/her home   Goal Outcome # 4 Goal not met   Education Group   Additional Comments Pt and mom (via phone) educated on healing process with pt currently still in the acute phase and focus on decreasing pain and inflammation, gradually adding  back activities starting with daily activities, then focusing on exercises.  Pt was encouraged to use log roll, ice, and walker to help facilitate healing process and take work off the back.  Information acknowledged.   Physical Therapy Treatment Plan   Physical Therapy  Treatment Plan Continue Current Treatment Plan   Anticipated Discharge Equipment and Recommendations   DC Equipment Recommendations Front-Wheel Walker   Discharge Recommendations Recommend home health for continued physical therapy services   Interdisciplinary Plan of Care Collaboration   IDT Collaboration with  Nursing   Patient Position at End of Therapy In Bed;Call Light within Reach;Tray Table within Reach  (ice pack on the back)   Collaboration Comments RN updated   Session Information   Date / Session Number  2/27 -2 (2/4, 3/4)

## 2025-02-28 ENCOUNTER — APPOINTMENT (OUTPATIENT)
Dept: RADIOLOGY | Facility: MEDICAL CENTER | Age: 25
DRG: 552 | End: 2025-02-28
Attending: INTERNAL MEDICINE
Payer: COMMERCIAL

## 2025-02-28 LAB
ALBUMIN SERPL BCP-MCNC: 3.9 G/DL (ref 3.2–4.9)
BUN SERPL-MCNC: 6 MG/DL (ref 8–22)
CALCIUM ALBUM COR SERPL-MCNC: 8.9 MG/DL (ref 8.5–10.5)
CALCIUM SERPL-MCNC: 8.8 MG/DL (ref 8.5–10.5)
CHLORIDE SERPL-SCNC: 107 MMOL/L (ref 96–112)
CO2 SERPL-SCNC: 21 MMOL/L (ref 20–33)
CREAT SERPL-MCNC: 0.53 MG/DL (ref 0.5–1.4)
CRP SERPL HS-MCNC: <0.3 MG/DL (ref 0–0.75)
ERYTHROCYTE [DISTWIDTH] IN BLOOD BY AUTOMATED COUNT: 44.9 FL (ref 35.9–50)
ERYTHROCYTE [SEDIMENTATION RATE] IN BLOOD BY WESTERGREN METHOD: 4 MM/HOUR (ref 0–25)
GFR SERPLBLD CREATININE-BSD FMLA CKD-EPI: 132 ML/MIN/1.73 M 2
GLUCOSE SERPL-MCNC: 98 MG/DL (ref 65–99)
HCT VFR BLD AUTO: 39.6 % (ref 37–47)
HGB BLD-MCNC: 12.9 G/DL (ref 12–16)
MAGNESIUM SERPL-MCNC: 2.1 MG/DL (ref 1.5–2.5)
MCH RBC QN AUTO: 30.4 PG (ref 27–33)
MCHC RBC AUTO-ENTMCNC: 32.6 G/DL (ref 32.2–35.5)
MCV RBC AUTO: 93.4 FL (ref 81.4–97.8)
PHOSPHATE SERPL-MCNC: 3.1 MG/DL (ref 2.5–4.5)
PLATELET # BLD AUTO: 197 K/UL (ref 164–446)
PLATELET # BLD AUTO: 277 K/UL (ref 164–446)
PMV BLD AUTO: 9.5 FL (ref 9–12.9)
POTASSIUM SERPL-SCNC: 3.7 MMOL/L (ref 3.6–5.5)
RBC # BLD AUTO: 4.24 M/UL (ref 4.2–5.4)
SODIUM SERPL-SCNC: 138 MMOL/L (ref 135–145)
WBC # BLD AUTO: 8.7 K/UL (ref 4.8–10.8)

## 2025-02-28 PROCEDURE — 80069 RENAL FUNCTION PANEL: CPT

## 2025-02-28 PROCEDURE — 36415 COLL VENOUS BLD VENIPUNCTURE: CPT

## 2025-02-28 PROCEDURE — 700111 HCHG RX REV CODE 636 W/ 250 OVERRIDE (IP): Mod: JZ,UD | Performed by: INTERNAL MEDICINE

## 2025-02-28 PROCEDURE — 76775 US EXAM ABDO BACK WALL LIM: CPT

## 2025-02-28 PROCEDURE — 85027 COMPLETE CBC AUTOMATED: CPT

## 2025-02-28 PROCEDURE — 85652 RBC SED RATE AUTOMATED: CPT

## 2025-02-28 PROCEDURE — G0378 HOSPITAL OBSERVATION PER HR: HCPCS

## 2025-02-28 PROCEDURE — 700102 HCHG RX REV CODE 250 W/ 637 OVERRIDE(OP): Mod: UD

## 2025-02-28 PROCEDURE — 700101 HCHG RX REV CODE 250: Mod: UD | Performed by: EMERGENCY MEDICINE

## 2025-02-28 PROCEDURE — 700111 HCHG RX REV CODE 636 W/ 250 OVERRIDE (IP): Mod: JZ,UD

## 2025-02-28 PROCEDURE — 700105 HCHG RX REV CODE 258: Mod: UD | Performed by: INTERNAL MEDICINE

## 2025-02-28 PROCEDURE — 99233 SBSQ HOSP IP/OBS HIGH 50: CPT | Performed by: INTERNAL MEDICINE

## 2025-02-28 PROCEDURE — A9270 NON-COVERED ITEM OR SERVICE: HCPCS | Mod: UD

## 2025-02-28 PROCEDURE — 86140 C-REACTIVE PROTEIN: CPT

## 2025-02-28 PROCEDURE — 83735 ASSAY OF MAGNESIUM: CPT

## 2025-02-28 PROCEDURE — 700102 HCHG RX REV CODE 250 W/ 637 OVERRIDE(OP): Mod: UD | Performed by: INTERNAL MEDICINE

## 2025-02-28 PROCEDURE — A9270 NON-COVERED ITEM OR SERVICE: HCPCS | Mod: UD | Performed by: INTERNAL MEDICINE

## 2025-02-28 RX ORDER — POLYETHYLENE GLYCOL 3350 17 G/17G
1 POWDER, FOR SOLUTION ORAL
Status: DISCONTINUED | OUTPATIENT
Start: 2025-02-28 | End: 2025-03-02

## 2025-02-28 RX ORDER — AMOXICILLIN 250 MG
2 CAPSULE ORAL 2 TIMES DAILY
Status: DISCONTINUED | OUTPATIENT
Start: 2025-02-28 | End: 2025-03-02

## 2025-02-28 RX ORDER — LACTULOSE 10 G/15ML
30 SOLUTION ORAL ONCE
Status: COMPLETED | OUTPATIENT
Start: 2025-02-28 | End: 2025-02-28

## 2025-02-28 RX ORDER — BACLOFEN 10 MG/1
20 TABLET ORAL 3 TIMES DAILY
Status: DISCONTINUED | OUTPATIENT
Start: 2025-02-28 | End: 2025-03-03

## 2025-02-28 RX ADMIN — GABAPENTIN 300 MG: 300 CAPSULE ORAL at 17:20

## 2025-02-28 RX ADMIN — HYDROMORPHONE HYDROCHLORIDE 6 MG: 4 TABLET ORAL at 19:16

## 2025-02-28 RX ADMIN — SENNOSIDES AND DOCUSATE SODIUM 2 TABLET: 50; 8.6 TABLET ORAL at 17:20

## 2025-02-28 RX ADMIN — HYDROMORPHONE HYDROCHLORIDE 6 MG: 4 TABLET ORAL at 06:09

## 2025-02-28 RX ADMIN — HYDROMORPHONE HYDROCHLORIDE 6 MG: 4 TABLET ORAL at 03:00

## 2025-02-28 RX ADMIN — ACETAMINOPHEN 1000 MG: 500 TABLET ORAL at 11:23

## 2025-02-28 RX ADMIN — HYDROMORPHONE HYDROCHLORIDE 0.5 MG: 1 INJECTION, SOLUTION INTRAMUSCULAR; INTRAVENOUS; SUBCUTANEOUS at 01:24

## 2025-02-28 RX ADMIN — ACETAMINOPHEN 1000 MG: 500 TABLET ORAL at 00:09

## 2025-02-28 RX ADMIN — HYDROMORPHONE HYDROCHLORIDE 0.5 MG: 1 INJECTION, SOLUTION INTRAMUSCULAR; INTRAVENOUS; SUBCUTANEOUS at 04:32

## 2025-02-28 RX ADMIN — METHYLPREDNISOLONE 4 MG: 4 TABLET ORAL at 07:45

## 2025-02-28 RX ADMIN — HYDROMORPHONE HYDROCHLORIDE 6 MG: 4 TABLET ORAL at 00:10

## 2025-02-28 RX ADMIN — DEXTROAMPHETAMINE SACCHARATE, AMPHETAMINE ASPARTATE, DEXTROAMPHETAMINE SULFATE, AMPHETAMINE SULFATE TABLETS, 10 MG,CLL 20 MG: 2.5; 2.5; 2.5; 2.5 TABLET ORAL at 07:45

## 2025-02-28 RX ADMIN — HYDROMORPHONE HYDROCHLORIDE 0.5 MG: 1 INJECTION, SOLUTION INTRAMUSCULAR; INTRAVENOUS; SUBCUTANEOUS at 20:48

## 2025-02-28 RX ADMIN — HYDROMORPHONE HYDROCHLORIDE 6 MG: 4 TABLET ORAL at 09:46

## 2025-02-28 RX ADMIN — LAMOTRIGINE 300 MG: 100 TABLET ORAL at 06:08

## 2025-02-28 RX ADMIN — METHYLPREDNISOLONE 4 MG: 4 TABLET ORAL at 17:20

## 2025-02-28 RX ADMIN — ENOXAPARIN SODIUM 40 MG: 100 INJECTION SUBCUTANEOUS at 17:21

## 2025-02-28 RX ADMIN — HYDROMORPHONE HYDROCHLORIDE 0.5 MG: 1 INJECTION, SOLUTION INTRAMUSCULAR; INTRAVENOUS; SUBCUTANEOUS at 14:29

## 2025-02-28 RX ADMIN — SODIUM CHLORIDE: 9 INJECTION, SOLUTION INTRAVENOUS at 13:10

## 2025-02-28 RX ADMIN — GABAPENTIN 300 MG: 300 CAPSULE ORAL at 06:09

## 2025-02-28 RX ADMIN — HYDROMORPHONE HYDROCHLORIDE 6 MG: 4 TABLET ORAL at 22:26

## 2025-02-28 RX ADMIN — ONDANSETRON 4 MG: 2 INJECTION INTRAMUSCULAR; INTRAVENOUS at 13:08

## 2025-02-28 RX ADMIN — BACLOFEN 10 MG: 10 TABLET ORAL at 11:24

## 2025-02-28 RX ADMIN — BACLOFEN 10 MG: 10 TABLET ORAL at 06:09

## 2025-02-28 RX ADMIN — LACTULOSE 30 ML: 10 SOLUTION ORAL at 13:08

## 2025-02-28 RX ADMIN — HYDROMORPHONE HYDROCHLORIDE 0.5 MG: 1 INJECTION, SOLUTION INTRAMUSCULAR; INTRAVENOUS; SUBCUTANEOUS at 09:51

## 2025-02-28 RX ADMIN — GABAPENTIN 300 MG: 300 CAPSULE ORAL at 11:24

## 2025-02-28 RX ADMIN — ACETAMINOPHEN 1000 MG: 500 TABLET ORAL at 06:09

## 2025-02-28 RX ADMIN — HYDROMORPHONE HYDROCHLORIDE 6 MG: 4 TABLET ORAL at 13:08

## 2025-02-28 RX ADMIN — LIDOCAINE 1 PATCH: 4 PATCH TOPICAL at 19:15

## 2025-02-28 RX ADMIN — PROCHLORPERAZINE EDISYLATE 10 MG: 5 INJECTION INTRAMUSCULAR; INTRAVENOUS at 07:45

## 2025-02-28 RX ADMIN — BACLOFEN 20 MG: 10 TABLET ORAL at 17:20

## 2025-02-28 ASSESSMENT — PAIN DESCRIPTION - PAIN TYPE
TYPE: ACUTE PAIN
TYPE: CHRONIC PAIN
TYPE: ACUTE PAIN
TYPE: ACUTE PAIN;CHRONIC PAIN
TYPE: ACUTE PAIN
TYPE: ACUTE PAIN
TYPE: CHRONIC PAIN
TYPE: ACUTE PAIN
TYPE: ACUTE PAIN
TYPE: ACUTE PAIN;CHRONIC PAIN
TYPE: ACUTE PAIN

## 2025-02-28 ASSESSMENT — ENCOUNTER SYMPTOMS
SORE THROAT: 0
TINGLING: 1
ABDOMINAL PAIN: 0
WEAKNESS: 0
PALPITATIONS: 0
NAUSEA: 0
SHORTNESS OF BREATH: 0
DEPRESSION: 0
CHILLS: 0
FEVER: 0
COUGH: 0
DOUBLE VISION: 0
BLURRED VISION: 0
VOMITING: 0
BACK PAIN: 1

## 2025-02-28 ASSESSMENT — FIBROSIS 4 INDEX: FIB4 SCORE: 0.31

## 2025-02-28 NOTE — CARE PLAN
The patient is Watcher - Medium risk of patient condition declining or worsening    Shift Goals  Clinical Goals: Pain management, decrease IV pain meds  Patient Goals: Pain control  Family Goals: Not present    Progress made toward(s) clinical / shift goals:    Problem: Knowledge Deficit - Standard  Goal: Patient and family/care givers will demonstrate understanding of plan of care, disease process/condition, diagnostic tests and medications  Outcome: Progressing  Note: Pt is AO4. Call light within reach. Educated and understand POC. All questions answered at this time.       Patient is not progressing towards the following goals:      Problem: Pain - Standard  Goal: Alleviation of pain or a reduction in pain to the patient’s comfort goal  Outcome: Not Progressing  Flowsheets (Taken 2/28/2025 0124 by Terri Marvin R.N.)  Pain Rating Scale (NPRS): 8  Note: Patient complains of pain and request pharmacologic interventions at least Q2-4

## 2025-02-28 NOTE — DISCHARGE PLANNING
[facility] Transitional Care Coordination    Referral from:  Dr. Barbosa  Insurance Provider on Facesheet:  [facility] HPN  Potential Rehab diagnosis:  Other Ortho/Neuro    Chart review indicates patient may have ongoing medical management, limited therapy need for IRF level care  D/C Support:  Mother    No Physiatry consult per protocol.  Current documentation  does not reflect ongoing acute therapy need in 2 of 3 disciplines meeting CMS criteria for IRF level care.  PT 02/27 recommending home health for continued physical therapy services.  If there are interval changes, please reach out to Rehab [facility] [id number].  Please reach out if PMR consult requested for medical management.     Last Covid test:    Thank you for the referral.

## 2025-02-28 NOTE — DIETARY
Nutrition Services: Follow-up for PO intake   Day 5 of admit. Ngoc Morales is a 24 y.o. female with admitting DX of Intractable pain [R52]     Current diet order:   Regular diet  PO intake per ADLs since initial RD assessment has been 25-50% x 1 meal, 50-75% x 2 meals.    Problem: Nutritional:  Goal: Achieve adequate nutritional intake  Description: Patient will consume >50% of meals  Outcome: progressing     Plan/ Recommendations:      Encourage PO intake >50% of meals  Continue to document intake of all meals as % taken in ADL's to provide interdisciplinary communication across all shifts.   Monitor weight.  Nutrition rep to continue to see patient for ongoing meal and snack preferences.       RD following

## 2025-02-28 NOTE — CARE PLAN
The patient is Watcher - Medium risk of patient condition declining or worsening    Shift Goals  Clinical Goals: Pt will tolerate pain without IV pain meds  Patient Goals: Pain control  Family Goals: Not present    Progress made toward(s) clinical / shift goals:       Problem: Pain - Standard  Goal: Alleviation of pain or a reduction in pain to the patient’s comfort goal  Outcome: Progressing  Note: Medicated with PRN pain medications. Pt states medications are effective in decreasing pain. Breathing WDL, resting comfortably.     Problem: Knowledge Deficit - Standard  Goal: Patient and family/care givers will demonstrate understanding of plan of care, disease process/condition, diagnostic tests and medications  Outcome: Progressing  Note: A/Ox4, patient able to understand plan of care, all questions answered at this time.      Problem: Fall Risk  Goal: Patient will remain free from falls  Outcome: Progressing  Note: Patient remained free from falls throughout shift. Bed locked and in lowest position, call light and belongings within reach. Room free from clutter. Patient calls appropriately.        Patient is not progressing towards the following goals:

## 2025-02-28 NOTE — FACE TO FACE
Face to Face Note  -  Durable Medical Equipment    Jose Barbosa D.O. - NPI: [id number]  I certify that this patient is under my care and that they had a durable medical equipment(DME)face to face encounter by myself that meets the physician DME face-to-face encounter requirements with this patient on:    Date of encounter:   Patient:                    MRN:                       YOB: 2025  Ngoc Morales  [id number]  2000     The encounter with the patient was in whole, or in part, for the following medical condition, which is the primary reason for durable medical equipment:  Other - spinal stenosis    I certify that, based on my findings, the following durable medical equipment is medically necessary:    Walkers and Other DME Equipment - shower chair .    My Clinical findings support the need for the above equipment due to:  Abnormal Gait

## 2025-02-28 NOTE — PROGRESS NOTES
[facility] Medicine Daily Progress Note    Date of Service  2/28/2025    Chief Complaint  Ngoc Morales is a 24 y.o. female admitted 2/23/2025 with back pain.    Hospital Course  Ngoc is a 24 y.o. female with a PMH of L4-L5 disc herniation and Hx of lymphoma (in remission), who presented 2/23/2025 with symptoms of acute worsening low back pain.    Patient states that symptoms started after standing up from a squatting position to replace a filter. Seen with mother at bedside. Patient reports she was diagnosed with L4-L5 disc herniation several months prior to current admission, states this is in the setting of osteopenia from prior treatment for her lymphoma, which has been in remission. She is being followed by pain management Dr. Nguyen and [facility]. Reports she was previously able to perform day to day activities as pain was previously resolved, has had persistent low back pain with sciatica since Friday. Pain is sharp and radiates to bilateral hips and bilateral lower extremities, associated with numbness and tingling of BLE, nausea, as well as worsening urinary incontinence that began the evening prior to admission. Reports she was ambulating to the bathroom and experienced complete loss of urine, has never had episodes of urinary incontinence previously. Denies fever, chills, history of IV drug use.      In the ER, VSS. Labs unremarkable. MRI lumbar spine shows moderate-sized central disc protrusion with slight interval increase since prior exam. Received gabapentin, hydromorphone, ketamine, and ondansetron in the ER.     Patient was started on multimodal pain regimen.  I consulted neurosurgery    MRI of cervical and thoracic spine also came back negative for tumor/cord compression.  Neurosurgery recommended nonoperative management  Patient was initially was having some urinary retention but this resolved.  Her postvoid residual has been monitored and it was slow.    Interval Problem Update  Patient  is still having significant pain that oral pain medication is not managing at this time.  No further episodes of urinary continence.  -Plan of care: Multimodal pain management with p.o. and IV medication.  MRI cervical and thoracic spine.  -Disposition: Patient to remain overnight as observation status for further management.    Patient was seen and examined at bedside.  I have personally reviewed and interpreted vitals, labs, and imaging.    2/25.  Afebrile.  Stable vitals.  On room air.  Denies fever, chills or chest pains, shortness of breath.  Patient complains of persistent back pain.  She is upset with the regimen and does not feel that she is progressing.  We notified that she does not need surgery she felt like she was being pushed out the door may have been given a reason for why her back hurts.  Had long discussion with patient with her mom on the phone.  Sweats methocarbamol to baclofen.  Increase gabapentin.  Switch oral oxycodone to oral Dilaudid.  Switch oral oxycodone to oral Dilaudid.  She also requested to be restarted on her oral Adderall and may be going through withdrawals  2/26.  Afebrile.  Stable vitals.  On room air.  Patient states the pain is better controlled on the regimen.  Discussed plan of care with patient and mom over the phone  2/27.  Afebrile.  Stable vitals.  On room air.  Denies fever, chills, chest pain, shortness of breath.  His back pain is improved from yesterday with new regimen but this morning feels like the oral Dilaudid did not last as long as yesterday.  With complaints of neck pain in addition to back pain.  Apparently urology was also planning for kidney scan.  Ordered renal ultrasound.  Counseled about prior urinalysis.  Ordered ESR and CRP inflammatory markers.  Plan of care discussed with patient and mother on the phone.  2/28.  Afebrile.  Stable vitals.  On room air.  Denies fevers, chills, chest pains, shortness of breath.  Still having persistent back pain  requiring IV Dilaudid for breakthrough pain.  She complains that the oral pain medicine does not last long enough.  Counseled her that it is available to her up to every 2 hours and she usually uses it after more than 3.  PT/OT recommended home health.  I had a long conversation with patient and mom over the phone.  Apparently home health company called her mom and they cannot afford home health.  Patient still feels she is in too much pain to get around and take care of herself.  Discussed going to rehab with her.  I did consult physiatry for intensive rehab consideration as well as psychiatry as patient has a lot of anxiety and there is definitely a psychosomatic component to her pain.    I have discussed this patient's plan of care and discharge plan at IDT rounds today with Case Management, Nursing, Nursing leadership, and other members of the IDT team.    Consultants/Specialty  neurosurgery    Code Status  Full Code    Disposition  The patient is not medically cleared for discharge to home or a post-acute facility.  Anticipate discharge to: home with organized home healthcare and close outpatient follow-up    I have placed the appropriate orders for post-discharge needs.    Review of Systems  Review of Systems   Constitutional:  Negative for chills, fever and malaise/fatigue.   HENT:  Negative for congestion and sore throat.    Eyes:  Negative for blurred vision and double vision.   Respiratory:  Negative for cough and shortness of breath.    Cardiovascular:  Negative for chest pain, palpitations and leg swelling.   Gastrointestinal:  Negative for abdominal pain, nausea and vomiting.   Genitourinary:  Negative for dysuria and hematuria.   Musculoskeletal:  Positive for back pain.   Skin:  Negative for itching and rash.   Neurological:  Positive for tingling. Negative for weakness.        Numbness and tingling BLE   Psychiatric/Behavioral:  Negative for depression and suicidal ideas.         Physical Exam  Temp:   [36.1 °C (97 °F)-36.6 °C (97.9 °F)] 36.3 °C (97.4 °F)  Pulse:  [78-89] 89  Resp:  [16-20] 18  BP: (100-119)/(61-71) 100/61  SpO2:  [95 %-99 %] 99 %    Physical Exam  Constitutional:       Appearance: Normal appearance.   HENT:      Head: Normocephalic and atraumatic.      Nose: Nose normal.      Mouth/Throat:      Mouth: Mucous membranes are moist.   Eyes:      Pupils: Pupils are equal, round, and reactive to light.   Cardiovascular:      Rate and Rhythm: Normal rate and regular rhythm.      Pulses: Normal pulses.      Heart sounds: Normal heart sounds.   Pulmonary:      Effort: Pulmonary effort is normal.      Breath sounds: Normal breath sounds.   Abdominal:      General: Bowel sounds are normal.      Palpations: Abdomen is soft.   Musculoskeletal:         General: Normal range of motion.      Cervical back: Normal range of motion.   Skin:     General: Skin is warm and dry.   Neurological:      General: No focal deficit present.      Mental Status: She is alert. Mental status is at baseline.   Psychiatric:         Mood and Affect: Mood normal.         Behavior: Behavior normal.         Fluids  No intake or output data in the 24 hours ending 02/28/25 0530         Laboratory  Recent Labs     02/26/25  0640 02/27/25  0828 02/28/25  0116   WBC 8.3 8.5 8.7   RBC 4.30 4.33 4.24   HEMOGLOBIN 13.0 13.0 12.9   HEMATOCRIT 39.6 40.4 39.6   MCV 92.1 93.3 93.4   MCH 30.2 30.0 30.4   MCHC 32.8 32.2 32.6   RDW 45.1 45.2 44.9   PLATELETCT 307 278 277   MPV 9.8 9.8 9.5     Recent Labs     02/26/25  0640 02/27/25  0828 02/28/25  0116   SODIUM 138 139 138   POTASSIUM 4.1 3.9 3.7   CHLORIDE 107 106 107   CO2 21 23 21   GLUCOSE 101* 82 98   BUN 10 8 6*   CREATININE 0.61 0.58 0.53   CALCIUM 8.5 8.5 8.8                   Imaging  CT-HEAD W/O   Final Result      No definite acute intracranial abnormality.               MR-THORACIC SPINE-WITH & W/O   Final Result      1.  Minor degenerative changes with multilevel tiny ventral extradural  defects consistent with disc bulges or disc/osteophyte change at T2-T3, T5-T6, T6-T7, T8-T9. There is no cord impingement or central stenosis at any thoracic level.   2.  No myelopathic cord signal.      MR-CERVICAL SPINE-WITH & W/O   Final Result      1.  C5-6 mild left-sided inferior foraminal stenosis due to uncinate hypertrophy.   2.  C6-7 mild disc bulging. No central stenosis or foraminal stenosis.   3.  No myelopathic cord signal.      MR-LUMBAR SPINE-W/O   Final Result      1.  L4-5 moderate-sized central disc protrusion slightly favoring the left with small components of cephalad and caudad extrusion. Focal impression on the ventral thecal sac without central stenosis. Slight interval increase since prior exam. Borderline    left lateral recess stenosis. Borderline bilateral foraminal stenosis.   2.  L5-S1 mild broad-based central disc bulging. No central stenosis or significant foraminal stenosis.      US-RENAL    (Results Pending)        Assessment/Plan  * Intractable low back pain- (present on admission)  Assessment & Plan  2/28/2025  Secondary to L4-L5 disc herniation. Associated symptoms of urinary incontinence and BLE numbness and tingling. MRI lumbar spine shows moderate-sized central disc protrusion with slight interval increase since prior exam.  Multimodal pain management-scheduled IV Toradol, scheduled Robaxin, scheduled Tylenol, scheduled lidocaine patch, scheduled Medrol Dosepak, scheduled gabapentin, as needed IV and p.o. analgesics  Neurosurgery consulted at this point recommending MRI C-spine/T-spine. Findings of the MRI of the lumbar spine without causing symptoms per neurosurgery.  Postvoid residual 28 cc.  Patient not retaining at this time.    Allergic rhinitis  Assessment & Plan  2/28/2025  Diphenhydramine PRN    Alcohol use disorder  Assessment & Plan  2/28/2025  Drinks 1 drink daily, occasionally more than 1 drink daily but no more than 2-3 drinks daily.  Encouraged  cessation    Hematuria  Assessment & Plan  2/28/2025  Incidental finding on UA. Not associated with LUTS.    History of B-cell lymphoma- (present on admission)  Assessment & Plan  2/28/2025  Dx 2017. In remission.  Follow-up with oncology Dr. Theodore as outpatient    Chronic bilateral low back pain with bilateral sciatica- (present on admission)  Assessment & Plan  2/28/2025  Follow-up with pain management Dr. Nguyen and neurology as outpatient         VTE prophylaxis: Lovenox    I have performed a physical exam and reviewed and updated ROS and Plan today (2/28/2025). In review of yesterday's note (2/27/2025), there are no changes except as documented above.    Greater than 50 minutes spent prepping to see patient (e.g. review of tests) obtaining and/or reviewing separately obtained history. Performing a medically appropriate examination and/ evaluation.  Counseling and educating the patient/family/caregiver.  Ordering medications, tests, or procedures.  Referring and communicating with other health care professionals.  Documenting clinical information in EPIC.  Independently interpreting results and communicating results to patient/family/caregiver.  Care coordination.

## 2025-03-01 ENCOUNTER — APPOINTMENT (OUTPATIENT)
Dept: RADIOLOGY | Facility: MEDICAL CENTER | Age: 25
DRG: 552 | End: 2025-03-01
Attending: INTERNAL MEDICINE
Payer: COMMERCIAL

## 2025-03-01 LAB
ALBUMIN SERPL BCP-MCNC: 4 G/DL (ref 3.2–4.9)
BUN SERPL-MCNC: 5 MG/DL (ref 8–22)
CALCIUM ALBUM COR SERPL-MCNC: 9 MG/DL (ref 8.5–10.5)
CALCIUM SERPL-MCNC: 9 MG/DL (ref 8.5–10.5)
CHLORIDE SERPL-SCNC: 107 MMOL/L (ref 96–112)
CO2 SERPL-SCNC: 21 MMOL/L (ref 20–33)
CREAT SERPL-MCNC: 0.52 MG/DL (ref 0.5–1.4)
ERYTHROCYTE [DISTWIDTH] IN BLOOD BY AUTOMATED COUNT: 45.3 FL (ref 35.9–50)
GFR SERPLBLD CREATININE-BSD FMLA CKD-EPI: 133 ML/MIN/1.73 M 2
GLUCOSE SERPL-MCNC: 93 MG/DL (ref 65–99)
HCT VFR BLD AUTO: 41.2 % (ref 37–47)
HGB BLD-MCNC: 13.2 G/DL (ref 12–16)
MAGNESIUM SERPL-MCNC: 2 MG/DL (ref 1.5–2.5)
MCH RBC QN AUTO: 30 PG (ref 27–33)
MCHC RBC AUTO-ENTMCNC: 32 G/DL (ref 32.2–35.5)
MCV RBC AUTO: 93.6 FL (ref 81.4–97.8)
PHOSPHATE SERPL-MCNC: 3.7 MG/DL (ref 2.5–4.5)
PLATELET # BLD AUTO: 284 K/UL (ref 164–446)
PMV BLD AUTO: 9.9 FL (ref 9–12.9)
POTASSIUM SERPL-SCNC: 3.6 MMOL/L (ref 3.6–5.5)
RBC # BLD AUTO: 4.4 M/UL (ref 4.2–5.4)
SODIUM SERPL-SCNC: 138 MMOL/L (ref 135–145)
WBC # BLD AUTO: 8 K/UL (ref 4.8–10.8)

## 2025-03-01 PROCEDURE — 36415 COLL VENOUS BLD VENIPUNCTURE: CPT

## 2025-03-01 PROCEDURE — 700111 HCHG RX REV CODE 636 W/ 250 OVERRIDE (IP): Mod: JZ,UD | Performed by: INTERNAL MEDICINE

## 2025-03-01 PROCEDURE — 770004 HCHG ROOM/CARE - ONCOLOGY PRIVATE *

## 2025-03-01 PROCEDURE — 700111 HCHG RX REV CODE 636 W/ 250 OVERRIDE (IP): Mod: JZ

## 2025-03-01 PROCEDURE — 700105 HCHG RX REV CODE 258: Mod: UD | Performed by: INTERNAL MEDICINE

## 2025-03-01 PROCEDURE — 700102 HCHG RX REV CODE 250 W/ 637 OVERRIDE(OP): Mod: UD

## 2025-03-01 PROCEDURE — A9270 NON-COVERED ITEM OR SERVICE: HCPCS | Performed by: INTERNAL MEDICINE

## 2025-03-01 PROCEDURE — 700102 HCHG RX REV CODE 250 W/ 637 OVERRIDE(OP): Performed by: INTERNAL MEDICINE

## 2025-03-01 PROCEDURE — 80069 RENAL FUNCTION PANEL: CPT

## 2025-03-01 PROCEDURE — A9270 NON-COVERED ITEM OR SERVICE: HCPCS | Mod: UD

## 2025-03-01 PROCEDURE — 99233 SBSQ HOSP IP/OBS HIGH 50: CPT | Performed by: INTERNAL MEDICINE

## 2025-03-01 PROCEDURE — 83735 ASSAY OF MAGNESIUM: CPT

## 2025-03-01 PROCEDURE — 700101 HCHG RX REV CODE 250: Performed by: EMERGENCY MEDICINE

## 2025-03-01 PROCEDURE — 85027 COMPLETE CBC AUTOMATED: CPT

## 2025-03-01 RX ORDER — HYDROMORPHONE HYDROCHLORIDE 4 MG/1
2 TABLET ORAL
Refills: 0 | Status: DISCONTINUED | OUTPATIENT
Start: 2025-03-01 | End: 2025-03-04

## 2025-03-01 RX ORDER — POTASSIUM CHLORIDE 1500 MG/1
40 TABLET, EXTENDED RELEASE ORAL ONCE
Status: COMPLETED | OUTPATIENT
Start: 2025-03-01 | End: 2025-03-01

## 2025-03-01 RX ORDER — HYDROMORPHONE HYDROCHLORIDE 4 MG/1
4 TABLET ORAL
Refills: 0 | Status: DISCONTINUED | OUTPATIENT
Start: 2025-03-01 | End: 2025-03-04

## 2025-03-01 RX ADMIN — BACLOFEN 20 MG: 10 TABLET ORAL at 05:10

## 2025-03-01 RX ADMIN — HYDROMORPHONE HYDROCHLORIDE 0.5 MG: 1 INJECTION, SOLUTION INTRAMUSCULAR; INTRAVENOUS; SUBCUTANEOUS at 14:42

## 2025-03-01 RX ADMIN — SODIUM CHLORIDE: 9 INJECTION, SOLUTION INTRAVENOUS at 10:51

## 2025-03-01 RX ADMIN — HYDROMORPHONE HYDROCHLORIDE 6 MG: 4 TABLET ORAL at 05:09

## 2025-03-01 RX ADMIN — HYDROMORPHONE HYDROCHLORIDE 0.5 MG: 1 INJECTION, SOLUTION INTRAMUSCULAR; INTRAVENOUS; SUBCUTANEOUS at 06:23

## 2025-03-01 RX ADMIN — SENNOSIDES AND DOCUSATE SODIUM 2 TABLET: 50; 8.6 TABLET ORAL at 17:50

## 2025-03-01 RX ADMIN — GABAPENTIN 300 MG: 300 CAPSULE ORAL at 17:50

## 2025-03-01 RX ADMIN — POTASSIUM CHLORIDE 40 MEQ: 1500 TABLET, EXTENDED RELEASE ORAL at 09:08

## 2025-03-01 RX ADMIN — SENNOSIDES AND DOCUSATE SODIUM 2 TABLET: 50; 8.6 TABLET ORAL at 05:10

## 2025-03-01 RX ADMIN — BACLOFEN 20 MG: 10 TABLET ORAL at 17:50

## 2025-03-01 RX ADMIN — GABAPENTIN 300 MG: 300 CAPSULE ORAL at 05:09

## 2025-03-01 RX ADMIN — HYDROMORPHONE HYDROCHLORIDE 4 MG: 4 TABLET ORAL at 13:08

## 2025-03-01 RX ADMIN — HYDROMORPHONE HYDROCHLORIDE 0.5 MG: 1 INJECTION, SOLUTION INTRAMUSCULAR; INTRAVENOUS; SUBCUTANEOUS at 10:49

## 2025-03-01 RX ADMIN — ENOXAPARIN SODIUM 40 MG: 100 INJECTION SUBCUTANEOUS at 17:49

## 2025-03-01 RX ADMIN — LAMOTRIGINE 300 MG: 100 TABLET ORAL at 05:09

## 2025-03-01 RX ADMIN — GABAPENTIN 300 MG: 300 CAPSULE ORAL at 11:52

## 2025-03-01 RX ADMIN — PROCHLORPERAZINE EDISYLATE 10 MG: 5 INJECTION INTRAMUSCULAR; INTRAVENOUS at 01:19

## 2025-03-01 RX ADMIN — HYDROMORPHONE HYDROCHLORIDE 6 MG: 4 TABLET ORAL at 07:56

## 2025-03-01 RX ADMIN — HYDROMORPHONE HYDROCHLORIDE 6 MG: 4 TABLET ORAL at 01:12

## 2025-03-01 RX ADMIN — METHYLPREDNISOLONE 4 MG: 4 TABLET ORAL at 07:56

## 2025-03-01 RX ADMIN — HYDROMORPHONE HYDROCHLORIDE 4 MG: 4 TABLET ORAL at 19:46

## 2025-03-01 RX ADMIN — LIDOCAINE 1 PATCH: 4 PATCH TOPICAL at 19:47

## 2025-03-01 RX ADMIN — BACLOFEN 20 MG: 10 TABLET ORAL at 11:52

## 2025-03-01 RX ADMIN — PROCHLORPERAZINE EDISYLATE 10 MG: 5 INJECTION INTRAMUSCULAR; INTRAVENOUS at 20:09

## 2025-03-01 RX ADMIN — PROCHLORPERAZINE EDISYLATE 10 MG: 5 INJECTION INTRAMUSCULAR; INTRAVENOUS at 09:45

## 2025-03-01 RX ADMIN — DEXTROAMPHETAMINE SACCHARATE, AMPHETAMINE ASPARTATE, DEXTROAMPHETAMINE SULFATE, AMPHETAMINE SULFATE TABLETS, 10 MG,CLL 20 MG: 2.5; 2.5; 2.5; 2.5 TABLET ORAL at 09:09

## 2025-03-01 ASSESSMENT — ENCOUNTER SYMPTOMS
ABDOMINAL PAIN: 0
SORE THROAT: 0
CHILLS: 0
SHORTNESS OF BREATH: 0
PALPITATIONS: 0
BACK PAIN: 1
DEPRESSION: 0
BLURRED VISION: 0
VOMITING: 0
DOUBLE VISION: 0
NAUSEA: 0
COUGH: 0
FEVER: 0
TINGLING: 1
WEAKNESS: 0

## 2025-03-01 ASSESSMENT — PAIN DESCRIPTION - PAIN TYPE
TYPE: CHRONIC PAIN
TYPE: ACUTE PAIN
TYPE: CHRONIC PAIN

## 2025-03-01 ASSESSMENT — FIBROSIS 4 INDEX: FIB4 SCORE: 0.3

## 2025-03-01 NOTE — PROGRESS NOTES
[facility] Medicine Daily Progress Note    Date of Service  3/1/2025    Chief Complaint  Ngoc Morales is a 24 y.o. female admitted 2/23/2025 with back pain.    Hospital Course  Ngoc is a 24 y.o. female with a PMH of L4-L5 disc herniation and Hx of lymphoma (in remission), who presented 2/23/2025 with symptoms of acute worsening low back pain.    Patient states that symptoms started after standing up from a squatting position to replace a filter. Seen with mother at bedside. Patient reports she was diagnosed with L4-L5 disc herniation several months prior to current admission, states this is in the setting of osteopenia from prior treatment for her lymphoma, which has been in remission. She is being followed by pain management Dr. Nguyen and [facility]. Reports she was previously able to perform day to day activities as pain was previously resolved, has had persistent low back pain with sciatica since Friday. Pain is sharp and radiates to bilateral hips and bilateral lower extremities, associated with numbness and tingling of BLE, nausea, as well as worsening urinary incontinence that began the evening prior to admission. Reports she was ambulating to the bathroom and experienced complete loss of urine, has never had episodes of urinary incontinence previously. Denies fever, chills, history of IV drug use.      In the ER, VSS. Labs unremarkable. MRI lumbar spine shows moderate-sized central disc protrusion with slight interval increase since prior exam. Received gabapentin, hydromorphone, ketamine, and ondansetron in the ER.     Patient was started on multimodal pain regimen.  I consulted neurosurgery    MRI of cervical and thoracic spine also came back negative for tumor/cord compression.  Neurosurgery recommended nonoperative management  Patient was initially was having some urinary retention but this resolved.  Her postvoid residual has been monitored and it was slow.    Interval Problem Update  Patient is  still having significant pain that oral pain medication is not managing at this time.  No further episodes of urinary continence.  -Plan of care: Multimodal pain management with p.o. and IV medication.  MRI cervical and thoracic spine.  -Disposition: Patient to remain overnight as observation status for further management.    Patient was seen and examined at bedside.  I have personally reviewed and interpreted vitals, labs, and imaging.    2/25.  Afebrile.  Stable vitals.  On room air.  Denies fever, chills or chest pains, shortness of breath.  Patient complains of persistent back pain.  She is upset with the regimen and does not feel that she is progressing.  We notified that she does not need surgery she felt like she was being pushed out the door may have been given a reason for why her back hurts.  Had long discussion with patient with her mom on the phone.  Sweats methocarbamol to baclofen.  Increase gabapentin.  Switch oral oxycodone to oral Dilaudid.  Switch oral oxycodone to oral Dilaudid.  She also requested to be restarted on her oral Adderall and may be going through withdrawals  2/26.  Afebrile.  Stable vitals.  On room air.  Patient states the pain is better controlled on the regimen.  Discussed plan of care with patient and mom over the phone  2/27.  Afebrile.  Stable vitals.  On room air.  Denies fever, chills, chest pain, shortness of breath.  His back pain is improved from yesterday with new regimen but this morning feels like the oral Dilaudid did not last as long as yesterday.  With complaints of neck pain in addition to back pain.  Apparently urology was also planning for kidney scan.  Ordered renal ultrasound.  Counseled about prior urinalysis.  Ordered ESR and CRP inflammatory markers.  Plan of care discussed with patient and mother on the phone.  2/28.  Afebrile.  Stable vitals.  On room air.  Denies fevers, chills, chest pains, shortness of breath.  Still having persistent back pain requiring  IV Dilaudid for breakthrough pain.  She complains that the oral pain medicine does not last long enough.  Counseled her that it is available to her up to every 2 hours and she usually uses it after more than 3.  PT/OT recommended home health.  I had a long conversation with patient and mom over the phone.  Apparently home health company called her mom and they cannot afford home health.  Patient still feels she is in too much pain to get around and take care of herself.  Discussed going to rehab with her.  I did consult physiatry for intensive rehab consideration as well as psychiatry as patient has a lot of anxiety and there is definitely a psychosomatic component to her pain.  3/1.  Afebrile.  Stable vitals.  On room air.  Replete potassium.  Denies fevers, chills, chest pains, shortness of breath.  Still having a lot of back pain.  States his 6 mg of Dilaudid makes her too sleepy.  She would like to go back to the 4 mg for severe pain and 2 mg for moderate pain dose.  Still having a lot of pain to get around out of bed, but she states she is moving faster.  I did encourage ambulation and mobilization.  Patient also complains of some shortness of breath.  States she gets short of breath at rest and just talking.  On exam she is speaking full sentences.  Her lungs are clear on exam.  Patient and mom were insistent on getting echocardiogram.  They are worried about renal ultrasound showing medical renal disease.  On her blood work her kidney function is excellent.  She has had a history of kidney injury from chemotherapy.  Explained to them that even though the kidney function did improve on her blood work, the anatomy is still scarred/changed from prior kidney injury.  She has not had much postvoid residual.  She is still very worried that she is not having enough urine output.  States she can drink 3 of her water bottles at bedside at day and still IV fluids.  Reports only urinating twice a day.  After further  discussion we will start strict ins and outs, daily weights.  She is not swelling and her lungs are clear as above.  Patient's pain has been refractory to oral medications.  Still requiring IV pain medicine.  Titration of regimen expected for the last more than 2 more midnights.  Will roll inpatient    I have discussed this patient's plan of care and discharge plan at IDT rounds today with Case Management, Nursing, Nursing leadership, and other members of the IDT team.    Consultants/Specialty  neurosurgery    Code Status  Full Code    Disposition  The patient is not medically cleared for discharge to home or a post-acute facility.  Anticipate discharge to: home with organized home healthcare and close outpatient follow-up    I have placed the appropriate orders for post-discharge needs.    Review of Systems  Review of Systems   Constitutional:  Negative for chills, fever and malaise/fatigue.   HENT:  Negative for congestion and sore throat.    Eyes:  Negative for blurred vision and double vision.   Respiratory:  Negative for cough and shortness of breath.    Cardiovascular:  Negative for chest pain, palpitations and leg swelling.   Gastrointestinal:  Negative for abdominal pain, nausea and vomiting.   Genitourinary:  Negative for dysuria and hematuria.   Musculoskeletal:  Positive for back pain.   Skin:  Negative for itching and rash.   Neurological:  Positive for tingling. Negative for weakness.        Numbness and tingling BLE   Psychiatric/Behavioral:  Negative for depression and suicidal ideas.         Physical Exam  Temp:  [36.6 °C (97.8 °F)-36.8 °C (98.2 °F)] 36.8 °C (98.2 °F)  Pulse:  [68-96] 68  Resp:  [15-16] 16  BP: ()/(66-80) 99/71  SpO2:  [94 %-98 %] 94 %    Physical Exam  Constitutional:       Appearance: Normal appearance.   HENT:      Head: Normocephalic and atraumatic.      Nose: Nose normal.      Mouth/Throat:      Mouth: Mucous membranes are moist.   Eyes:      Pupils: Pupils are equal,  round, and reactive to light.   Cardiovascular:      Rate and Rhythm: Normal rate and regular rhythm.      Pulses: Normal pulses.      Heart sounds: Normal heart sounds.   Pulmonary:      Effort: Pulmonary effort is normal.      Breath sounds: Normal breath sounds.   Abdominal:      General: Bowel sounds are normal.      Palpations: Abdomen is soft.   Musculoskeletal:         General: Normal range of motion.      Cervical back: Normal range of motion.   Skin:     General: Skin is warm and dry.   Neurological:      General: No focal deficit present.      Mental Status: She is alert. Mental status is at baseline.   Psychiatric:         Mood and Affect: Mood normal.         Behavior: Behavior normal.         Fluids  No intake or output data in the 24 hours ending 03/01/25 0803         Laboratory  Recent Labs     02/27/25  0828 02/28/25  0116 03/01/25  0224   WBC 8.5 8.7 8.0   RBC 4.33 4.24 4.40   HEMOGLOBIN 13.0 12.9 13.2   HEMATOCRIT 40.4 39.6 41.2   MCV 93.3 93.4 93.6   MCH 30.0 30.4 30.0   MCHC 32.2 32.6 32.0*   RDW 45.2 44.9 45.3   PLATELETCT 278 277 284   MPV 9.8 9.5 9.9     Recent Labs     02/27/25  0828 02/28/25  0116 03/01/25  0224   SODIUM 139 138 138   POTASSIUM 3.9 3.7 3.6   CHLORIDE 106 107 107   CO2 23 21 21   GLUCOSE 82 98 93   BUN 8 6* 5*   CREATININE 0.58 0.53 0.52   CALCIUM 8.5 8.8 9.0                   Imaging  US-RENAL   Final Result      1.  Kidneys are mildly echogenic concerning for medical renal disease.   2.  Prominent RIGHT extrarenal pelvis without definite hydronephrosis.      CT-HEAD W/O   Final Result      No definite acute intracranial abnormality.               MR-THORACIC SPINE-WITH & W/O   Final Result      1.  Minor degenerative changes with multilevel tiny ventral extradural defects consistent with disc bulges or disc/osteophyte change at T2-T3, T5-T6, T6-T7, T8-T9. There is no cord impingement or central stenosis at any thoracic level.   2.  No myelopathic cord signal.       MR-CERVICAL SPINE-WITH & W/O   Final Result      1.  C5-6 mild left-sided inferior foraminal stenosis due to uncinate hypertrophy.   2.  C6-7 mild disc bulging. No central stenosis or foraminal stenosis.   3.  No myelopathic cord signal.      MR-LUMBAR SPINE-W/O   Final Result      1.  L4-5 moderate-sized central disc protrusion slightly favoring the left with small components of cephalad and caudad extrusion. Focal impression on the ventral thecal sac without central stenosis. Slight interval increase since prior exam. Borderline    left lateral recess stenosis. Borderline bilateral foraminal stenosis.   2.  L5-S1 mild broad-based central disc bulging. No central stenosis or significant foraminal stenosis.           Assessment/Plan  * Intractable low back pain- (present on admission)  Assessment & Plan  3/1/2025  Secondary to L4-L5 disc herniation. Associated symptoms of urinary incontinence and BLE numbness and tingling. MRI lumbar spine shows moderate-sized central disc protrusion with slight interval increase since prior exam.  Multimodal pain management-scheduled IV Toradol, scheduled Robaxin, scheduled Tylenol, scheduled lidocaine patch, scheduled Medrol Dosepak, scheduled gabapentin, as needed IV and p.o. analgesics  Neurosurgery consulted at this point recommending MRI C-spine/T-spine. Findings of the MRI of the lumbar spine without causing symptoms per neurosurgery.  Postvoid residual 28 cc.  Patient not retaining at this time.    Allergic rhinitis  Assessment & Plan  3/1/2025  Diphenhydramine PRN    Alcohol use disorder  Assessment & Plan  3/1/2025  Drinks 1 drink daily, occasionally more than 1 drink daily but no more than 2-3 drinks daily.  Encouraged cessation    Hematuria  Assessment & Plan  3/1/2025  Incidental finding on UA. Not associated with LUTS.    History of B-cell lymphoma- (present on admission)  Assessment & Plan  3/1/2025  Dx 2017. In remission.  Follow-up with oncology Dr. Theodore as  outpatient    Chronic bilateral low back pain with bilateral sciatica- (present on admission)  Assessment & Plan  3/1/2025  Follow-up with pain management Dr. Nguyen and neurology as outpatient         VTE prophylaxis: Lovenox    I have performed a physical exam and reviewed and updated ROS and Plan today (3/1/2025). In review of yesterday's note (2/28/2025), there are no changes except as documented above.    Greater than 51 minutes spent prepping to see patient (e.g. review of tests) obtaining and/or reviewing separately obtained history. Performing a medically appropriate examination and/ evaluation.  Counseling and educating the patient/family/caregiver.  Ordering medications, tests, or procedures.  Referring and communicating with other health care professionals.  Documenting clinical information in EPIC.  Independently interpreting results and communicating results to patient/family/caregiver.  Care coordination.

## 2025-03-01 NOTE — CARE PLAN
The patient is Watcher - Medium risk of patient condition declining or worsening    Shift Goals  Clinical Goals: Patient will verbalize tolerable pain level through out shift, US kidney  Patient Goals: pain control, US kidney  Family Goals: updates    Progress made toward(s) clinical / shift goals:      Problem: Knowledge Deficit - Standard  Goal: Patient and family/care givers will demonstrate understanding of plan of care, disease process/condition, diagnostic tests and medications  Outcome: Progressing  Note: Pt is AO4. Call light within reach. Educated and understand POC. All questions answered at this time.          Patient is not progressing towards the following goals:      Problem: Pain - Standard  Goal: Alleviation of pain or a reduction in pain to the patient’s comfort goal  Outcome: Not Progressing  Flowsheets (Taken 2/28/2025 2226)  Pain Rating Scale (NPRS): 8  Note: Patient continues to verbalize intolerable pain and requesting pain medications. Administered pain medications according to MAR.

## 2025-03-01 NOTE — CARE PLAN
The patient is Stable - Low risk of patient condition declining or worsening    Shift Goals  Clinical Goals: hemodynamic stability  Patient Goals: pain control  Family Goals: fannie    Progress made toward(s) clinical / shift goals:    Problem: Pain - Standard  Goal: Alleviation of pain or a reduction in pain to the patient’s comfort goal  Outcome: Progressing     Problem: Fall Risk  Goal: Patient will remain free from falls  Outcome: Progressing       Patient is not progressing towards the following goals:

## 2025-03-02 LAB
ALBUMIN SERPL BCP-MCNC: 4 G/DL (ref 3.2–4.9)
BUN SERPL-MCNC: 7 MG/DL (ref 8–22)
CALCIUM ALBUM COR SERPL-MCNC: 9 MG/DL (ref 8.5–10.5)
CALCIUM SERPL-MCNC: 9 MG/DL (ref 8.5–10.5)
CHLORIDE SERPL-SCNC: 107 MMOL/L (ref 96–112)
CO2 SERPL-SCNC: 23 MMOL/L (ref 20–33)
CREAT SERPL-MCNC: 0.66 MG/DL (ref 0.5–1.4)
EKG IMPRESSION: NORMAL
ERYTHROCYTE [DISTWIDTH] IN BLOOD BY AUTOMATED COUNT: 45.7 FL (ref 35.9–50)
GFR SERPLBLD CREATININE-BSD FMLA CKD-EPI: 125 ML/MIN/1.73 M 2
GLUCOSE SERPL-MCNC: 92 MG/DL (ref 65–99)
HCT VFR BLD AUTO: 40.3 % (ref 37–47)
HGB BLD-MCNC: 13 G/DL (ref 12–16)
MAGNESIUM SERPL-MCNC: 2 MG/DL (ref 1.5–2.5)
MCH RBC QN AUTO: 29.7 PG (ref 27–33)
MCHC RBC AUTO-ENTMCNC: 32.3 G/DL (ref 32.2–35.5)
MCV RBC AUTO: 92 FL (ref 81.4–97.8)
PHOSPHATE SERPL-MCNC: 4.6 MG/DL (ref 2.5–4.5)
PLATELET # BLD AUTO: 231 K/UL (ref 164–446)
PLATELET # BLD AUTO: 285 K/UL (ref 164–446)
PMV BLD AUTO: 9.5 FL (ref 9–12.9)
POTASSIUM SERPL-SCNC: 4.1 MMOL/L (ref 3.6–5.5)
RBC # BLD AUTO: 4.38 M/UL (ref 4.2–5.4)
SODIUM SERPL-SCNC: 140 MMOL/L (ref 135–145)
WBC # BLD AUTO: 7.5 K/UL (ref 4.8–10.8)

## 2025-03-02 PROCEDURE — 700102 HCHG RX REV CODE 250 W/ 637 OVERRIDE(OP)

## 2025-03-02 PROCEDURE — A9270 NON-COVERED ITEM OR SERVICE: HCPCS | Performed by: INTERNAL MEDICINE

## 2025-03-02 PROCEDURE — A9270 NON-COVERED ITEM OR SERVICE: HCPCS

## 2025-03-02 PROCEDURE — 700105 HCHG RX REV CODE 258: Performed by: INTERNAL MEDICINE

## 2025-03-02 PROCEDURE — 85027 COMPLETE CBC AUTOMATED: CPT

## 2025-03-02 PROCEDURE — 93005 ELECTROCARDIOGRAM TRACING: CPT | Mod: TC | Performed by: INTERNAL MEDICINE

## 2025-03-02 PROCEDURE — 700102 HCHG RX REV CODE 250 W/ 637 OVERRIDE(OP): Performed by: INTERNAL MEDICINE

## 2025-03-02 PROCEDURE — 700101 HCHG RX REV CODE 250: Performed by: EMERGENCY MEDICINE

## 2025-03-02 PROCEDURE — 90837 PSYTX W PT 60 MINUTES: CPT | Performed by: SOCIAL WORKER

## 2025-03-02 PROCEDURE — 700111 HCHG RX REV CODE 636 W/ 250 OVERRIDE (IP): Mod: JZ

## 2025-03-02 PROCEDURE — 36415 COLL VENOUS BLD VENIPUNCTURE: CPT

## 2025-03-02 PROCEDURE — 80069 RENAL FUNCTION PANEL: CPT

## 2025-03-02 PROCEDURE — 83735 ASSAY OF MAGNESIUM: CPT

## 2025-03-02 PROCEDURE — 99233 SBSQ HOSP IP/OBS HIGH 50: CPT | Performed by: INTERNAL MEDICINE

## 2025-03-02 PROCEDURE — 770004 HCHG ROOM/CARE - ONCOLOGY PRIVATE *

## 2025-03-02 PROCEDURE — 93010 ELECTROCARDIOGRAM REPORT: CPT | Performed by: INTERNAL MEDICINE

## 2025-03-02 RX ORDER — AMOXICILLIN 250 MG
2 CAPSULE ORAL 2 TIMES DAILY
Status: DISCONTINUED | OUTPATIENT
Start: 2025-03-02 | End: 2025-03-04 | Stop reason: HOSPADM

## 2025-03-02 RX ORDER — MORPHINE SULFATE 15 MG/1
15 TABLET, FILM COATED, EXTENDED RELEASE ORAL EVERY 12 HOURS
Refills: 0 | Status: DISCONTINUED | OUTPATIENT
Start: 2025-03-02 | End: 2025-03-03

## 2025-03-02 RX ORDER — POLYETHYLENE GLYCOL 3350 17 G/17G
1 POWDER, FOR SOLUTION ORAL DAILY
Status: DISCONTINUED | OUTPATIENT
Start: 2025-03-02 | End: 2025-03-04 | Stop reason: HOSPADM

## 2025-03-02 RX ADMIN — ALPRAZOLAM 0.25 MG: 0.25 TABLET ORAL at 21:52

## 2025-03-02 RX ADMIN — GABAPENTIN 300 MG: 300 CAPSULE ORAL at 06:03

## 2025-03-02 RX ADMIN — BACLOFEN 20 MG: 10 TABLET ORAL at 17:31

## 2025-03-02 RX ADMIN — LIDOCAINE 1 PATCH: 4 PATCH TOPICAL at 19:28

## 2025-03-02 RX ADMIN — GABAPENTIN 300 MG: 300 CAPSULE ORAL at 17:31

## 2025-03-02 RX ADMIN — HYDROMORPHONE HYDROCHLORIDE 4 MG: 4 TABLET ORAL at 08:38

## 2025-03-02 RX ADMIN — HYDROMORPHONE HYDROCHLORIDE 0.5 MG: 1 INJECTION, SOLUTION INTRAMUSCULAR; INTRAVENOUS; SUBCUTANEOUS at 16:35

## 2025-03-02 RX ADMIN — HYDROMORPHONE HYDROCHLORIDE 4 MG: 4 TABLET ORAL at 01:45

## 2025-03-02 RX ADMIN — MORPHINE SULFATE 15 MG: 15 TABLET, FILM COATED, EXTENDED RELEASE ORAL at 17:30

## 2025-03-02 RX ADMIN — POLYETHYLENE GLYCOL 3350 1 PACKET: 17 POWDER, FOR SOLUTION ORAL at 12:00

## 2025-03-02 RX ADMIN — SENNOSIDES AND DOCUSATE SODIUM 2 TABLET: 50; 8.6 TABLET ORAL at 17:30

## 2025-03-02 RX ADMIN — HYDROMORPHONE HYDROCHLORIDE 0.5 MG: 1 INJECTION, SOLUTION INTRAMUSCULAR; INTRAVENOUS; SUBCUTANEOUS at 06:01

## 2025-03-02 RX ADMIN — SENNOSIDES AND DOCUSATE SODIUM 2 TABLET: 50; 8.6 TABLET ORAL at 06:03

## 2025-03-02 RX ADMIN — HYDROMORPHONE HYDROCHLORIDE 4 MG: 4 TABLET ORAL at 21:52

## 2025-03-02 RX ADMIN — SODIUM CHLORIDE: 9 INJECTION, SOLUTION INTRAVENOUS at 06:06

## 2025-03-02 RX ADMIN — HYDROMORPHONE HYDROCHLORIDE 4 MG: 4 TABLET ORAL at 19:27

## 2025-03-02 RX ADMIN — BACLOFEN 20 MG: 10 TABLET ORAL at 11:59

## 2025-03-02 RX ADMIN — BACLOFEN 20 MG: 10 TABLET ORAL at 06:03

## 2025-03-02 RX ADMIN — HYDROMORPHONE HYDROCHLORIDE 0.5 MG: 1 INJECTION, SOLUTION INTRAMUSCULAR; INTRAVENOUS; SUBCUTANEOUS at 10:30

## 2025-03-02 RX ADMIN — HYDROMORPHONE HYDROCHLORIDE 0.5 MG: 1 INJECTION, SOLUTION INTRAMUSCULAR; INTRAVENOUS; SUBCUTANEOUS at 22:44

## 2025-03-02 RX ADMIN — ONDANSETRON 4 MG: 2 INJECTION INTRAMUSCULAR; INTRAVENOUS at 16:49

## 2025-03-02 RX ADMIN — DEXTROAMPHETAMINE SACCHARATE, AMPHETAMINE ASPARTATE, DEXTROAMPHETAMINE SULFATE, AMPHETAMINE SULFATE TABLETS, 10 MG,CLL 20 MG: 2.5; 2.5; 2.5; 2.5 TABLET ORAL at 08:31

## 2025-03-02 RX ADMIN — HYDROMORPHONE HYDROCHLORIDE 2 MG: 4 TABLET ORAL at 11:59

## 2025-03-02 RX ADMIN — LAMOTRIGINE 300 MG: 100 TABLET ORAL at 06:03

## 2025-03-02 RX ADMIN — GABAPENTIN 300 MG: 300 CAPSULE ORAL at 12:00

## 2025-03-02 RX ADMIN — ONDANSETRON 4 MG: 2 INJECTION INTRAMUSCULAR; INTRAVENOUS at 12:00

## 2025-03-02 RX ADMIN — HYDROMORPHONE HYDROCHLORIDE 2 MG: 4 TABLET ORAL at 15:29

## 2025-03-02 ASSESSMENT — LIFESTYLE VARIABLES
EVER HAD A DRINK FIRST THING IN THE MORNING TO STEADY YOUR NERVES TO GET RID OF A HANGOVER: NO
TOTAL SCORE: 0
TOTAL SCORE: 0
DOES PATIENT WANT TO STOP DRINKING: NO
HOW MANY TIMES IN THE PAST YEAR HAVE YOU HAD 5 OR MORE DRINKS IN A DAY: 0
AVERAGE NUMBER OF DAYS PER WEEK YOU HAVE A DRINK CONTAINING ALCOHOL: 7
ON A TYPICAL DAY WHEN YOU DRINK ALCOHOL HOW MANY DRINKS DO YOU HAVE: 1
HAVE YOU EVER FELT YOU SHOULD CUT DOWN ON YOUR DRINKING: NO
ALCOHOL_USE: YES
EVER FELT BAD OR GUILTY ABOUT YOUR DRINKING: NO
CONSUMPTION TOTAL: NEGATIVE
TOTAL SCORE: 0
HAVE PEOPLE ANNOYED YOU BY CRITICIZING YOUR DRINKING: NO

## 2025-03-02 ASSESSMENT — PAIN DESCRIPTION - PAIN TYPE
TYPE: ACUTE PAIN
TYPE: ACUTE PAIN
TYPE: CHRONIC PAIN
TYPE: ACUTE PAIN
TYPE: CHRONIC PAIN
TYPE: ACUTE PAIN

## 2025-03-02 ASSESSMENT — ANXIETY QUESTIONNAIRES
4. TROUBLE RELAXING: SEVERAL DAYS
GAD7 TOTAL SCORE: 6
5. BEING SO RESTLESS THAT IT IS HARD TO SIT STILL: NOT AT ALL
IF YOU CHECKED OFF ANY PROBLEMS ON THIS QUESTIONNAIRE, HOW DIFFICULT HAVE THESE PROBLEMS MADE IT FOR YOU TO DO YOUR WORK, TAKE CARE OF THINGS AT HOME, OR GET ALONG WITH OTHER PEOPLE: SOMEWHAT DIFFICULT
2. NOT BEING ABLE TO STOP OR CONTROL WORRYING: SEVERAL DAYS
7. FEELING AFRAID AS IF SOMETHING AWFUL MIGHT HAPPEN: MORE THAN HALF THE DAYS
3. WORRYING TOO MUCH ABOUT DIFFERENT THINGS: SEVERAL DAYS
1. FEELING NERVOUS, ANXIOUS, OR ON EDGE: NOT AT ALL
6. BECOMING EASILY ANNOYED OR IRRITABLE: SEVERAL DAYS

## 2025-03-02 ASSESSMENT — ENCOUNTER SYMPTOMS
BLURRED VISION: 0
ABDOMINAL PAIN: 0
DOUBLE VISION: 0
BACK PAIN: 1
FEVER: 0
TINGLING: 1
CHILLS: 0
PALPITATIONS: 0
VOMITING: 0
SHORTNESS OF BREATH: 0
WEAKNESS: 0
NAUSEA: 0
DEPRESSION: 0
COUGH: 0
IRRITABILITY: 1
SORE THROAT: 0

## 2025-03-02 ASSESSMENT — FIBROSIS 4 INDEX: FIB4 SCORE: 0.3

## 2025-03-02 NOTE — PROGRESS NOTES
[facility] Medicine Daily Progress Note    Date of Service  3/2/2025    Chief Complaint  Ngoc Morales is a 24 y.o. female admitted 2/23/2025 with back pain.    Hospital Course  Ngoc is a 24 y.o. female with a PMH of L4-L5 disc herniation and Hx of lymphoma (in remission), who presented 2/23/2025 with symptoms of acute worsening low back pain.    Patient states that symptoms started after standing up from a squatting position to replace a filter. Seen with mother at bedside. Patient reports she was diagnosed with L4-L5 disc herniation several months prior to current admission, states this is in the setting of osteopenia from prior treatment for her lymphoma, which has been in remission. She is being followed by pain management Dr. Nguyen and [facility]. Reports she was previously able to perform day to day activities as pain was previously resolved, has had persistent low back pain with sciatica since Friday. Pain is sharp and radiates to bilateral hips and bilateral lower extremities, associated with numbness and tingling of BLE, nausea, as well as worsening urinary incontinence that began the evening prior to admission. Reports she was ambulating to the bathroom and experienced complete loss of urine, has never had episodes of urinary incontinence previously. Denies fever, chills, history of IV drug use.      In the ER, VSS. Labs unremarkable. MRI lumbar spine shows moderate-sized central disc protrusion with slight interval increase since prior exam. Received gabapentin, hydromorphone, ketamine, and ondansetron in the ER.     Patient was started on multimodal pain regimen.  I consulted neurosurgery    MRI of cervical and thoracic spine also came back negative for tumor/cord compression.  Neurosurgery recommended nonoperative management  Patient was initially was having some urinary retention but this resolved.  Her postvoid residual has been monitored and it was slow.    Interval Problem Update  Patient is  still having significant pain that oral pain medication is not managing at this time.  No further episodes of urinary continence.  -Plan of care: Multimodal pain management with p.o. and IV medication.  MRI cervical and thoracic spine.  -Disposition: Patient to remain overnight as observation status for further management.    Patient was seen and examined at bedside.  I have personally reviewed and interpreted vitals, labs, and imaging.    2/25.  Afebrile.  Stable vitals.  On room air.  Denies fever, chills or chest pains, shortness of breath.  Patient complains of persistent back pain.  She is upset with the regimen and does not feel that she is progressing.  We notified that she does not need surgery she felt like she was being pushed out the door may have been given a reason for why her back hurts.  Had long discussion with patient with her mom on the phone.  Sweats methocarbamol to baclofen.  Increase gabapentin.  Switch oral oxycodone to oral Dilaudid.  Switch oral oxycodone to oral Dilaudid.  She also requested to be restarted on her oral Adderall and may be going through withdrawals  2/26.  Afebrile.  Stable vitals.  On room air.  Patient states the pain is better controlled on the regimen.  Discussed plan of care with patient and mom over the phone  2/27.  Afebrile.  Stable vitals.  On room air.  Denies fever, chills, chest pain, shortness of breath.  His back pain is improved from yesterday with new regimen but this morning feels like the oral Dilaudid did not last as long as yesterday.  With complaints of neck pain in addition to back pain.  Apparently urology was also planning for kidney scan.  Ordered renal ultrasound.  Counseled about prior urinalysis.  Ordered ESR and CRP inflammatory markers.  Plan of care discussed with patient and mother on the phone.  2/28.  Afebrile.  Stable vitals.  On room air.  Denies fevers, chills, chest pains, shortness of breath.  Still having persistent back pain requiring  IV Dilaudid for breakthrough pain.  She complains that the oral pain medicine does not last long enough.  Counseled her that it is available to her up to every 2 hours and she usually uses it after more than 3.  PT/OT recommended home health.  I had a long conversation with patient and mom over the phone.  Apparently home health company called her mom and they cannot afford home health.  Patient still feels she is in too much pain to get around and take care of herself.  Discussed going to rehab with her.  I did consult physiatry for intensive rehab consideration as well as psychiatry as patient has a lot of anxiety and there is definitely a psychosomatic component to her pain.  3/1.  Afebrile.  Stable vitals.  On room air.  Replete potassium.  Denies fevers, chills, chest pains, shortness of breath.  Still having a lot of back pain.  States his 6 mg of Dilaudid makes her too sleepy.  She would like to go back to the 4 mg for severe pain and 2 mg for moderate pain dose.  Still having a lot of pain to get around out of bed, but she states she is moving faster.  I did encourage ambulation and mobilization.  Patient also complains of some shortness of breath.  States she gets short of breath at rest and just talking.  On exam she is speaking full sentences.  Her lungs are clear on exam.  Patient and mom were insistent on getting echocardiogram.  They are worried about renal ultrasound showing medical renal disease.  On her blood work her kidney function is excellent.  She has had a history of kidney injury from chemotherapy.  Explained to them that even though the kidney function did improve on her blood work, the anatomy is still scarred/changed from prior kidney injury.  She has not had much postvoid residual.  She is still very worried that she is not having enough urine output.  States she can drink 3 of her water bottles at bedside at day and still IV fluids.  Reports only urinating twice a day.  After further  discussion we will start strict ins and outs, daily weights.  She is not swelling and her lungs are clear as above.  Patient's pain has been refractory to oral medications.  Still requiring IV pain medicine.  Titration of regimen expected for the last more than 2 more midnights.  Will roll inpatient  3/2.  Afebrile.  Intermittent hypotension.  On room air.  Denies fevers, chills, chest pains.  Complains of intermittent shortness of breath when she gets up.  States the pain regimen is still not working for her.  Feels like the nausea meds is making her tired.  Ordered more aggressive bowel regimen.  Added long-acting morphine.  Patient was evaluated by psychiatry today.    I have discussed this patient's plan of care and discharge plan at IDT rounds today with Case Management, Nursing, Nursing leadership, and other members of the IDT team.    Consultants/Specialty  neurosurgery    Code Status  Full Code    Disposition  The patient is not medically cleared for discharge to home or a post-acute facility.  Anticipate discharge to: home with organized home healthcare and close outpatient follow-up    I have placed the appropriate orders for post-discharge needs.    Review of Systems  Review of Systems   Constitutional:  Negative for chills, fever and malaise/fatigue.   HENT:  Negative for congestion and sore throat.    Eyes:  Negative for blurred vision and double vision.   Respiratory:  Negative for cough and shortness of breath.    Cardiovascular:  Negative for chest pain, palpitations and leg swelling.   Gastrointestinal:  Negative for abdominal pain, nausea and vomiting.   Genitourinary:  Negative for dysuria and hematuria.   Musculoskeletal:  Positive for back pain.   Skin:  Negative for itching and rash.   Neurological:  Positive for tingling. Negative for weakness.        Numbness and tingling BLE   Psychiatric/Behavioral:  Negative for depression and suicidal ideas.         Physical Exam  Temp:  [36.3 °C (97.3  °F)-37.1 °C (98.8 °F)] 36.3 °C (97.3 °F)  Pulse:  [68-92] 68  Resp:  [15-16] 16  BP: (111-120)/(71-79) 111/71  SpO2:  [92 %-100 %] 93 %    Physical Exam  Constitutional:       Appearance: Normal appearance.   HENT:      Head: Normocephalic and atraumatic.      Nose: Nose normal.      Mouth/Throat:      Mouth: Mucous membranes are moist.   Eyes:      Pupils: Pupils are equal, round, and reactive to light.   Cardiovascular:      Rate and Rhythm: Normal rate and regular rhythm.      Pulses: Normal pulses.      Heart sounds: Normal heart sounds.   Pulmonary:      Effort: Pulmonary effort is normal.      Breath sounds: Normal breath sounds.   Abdominal:      General: Bowel sounds are normal.      Palpations: Abdomen is soft.   Musculoskeletal:         General: Normal range of motion.      Cervical back: Normal range of motion.   Skin:     General: Skin is warm and dry.   Neurological:      General: No focal deficit present.      Mental Status: She is alert. Mental status is at baseline.   Psychiatric:         Mood and Affect: Mood normal.         Behavior: Behavior normal.         Fluids    Intake/Output Summary (Last 24 hours) at 3/2/2025 0633  Last data filed at 3/2/2025 0605  Gross per 24 hour   Intake 1290 ml   Output --   Net 1290 ml            Laboratory  Recent Labs     02/28/25 0116 03/01/25 0224 03/02/25 0151   WBC 8.7 8.0 7.5   RBC 4.24 4.40 4.38   HEMOGLOBIN 12.9 13.2 13.0   HEMATOCRIT 39.6 41.2 40.3   MCV 93.4 93.6 92.0   MCH 30.4 30.0 29.7   MCHC 32.6 32.0* 32.3   RDW 44.9 45.3 45.7   PLATELETCT 277 284 285   MPV 9.5 9.9 9.5     Recent Labs     02/28/25 0116 03/01/25 0224 03/02/25 0151   SODIUM 138 138 140   POTASSIUM 3.7 3.6 4.1   CHLORIDE 107 107 107   CO2 21 21 23   GLUCOSE 98 93 92   BUN 6* 5* 7*   CREATININE 0.53 0.52 0.66   CALCIUM 8.8 9.0 9.0                   Imaging  US-RENAL   Final Result      1.  Kidneys are mildly echogenic concerning for medical renal disease.   2.  Prominent RIGHT  extrarenal pelvis without definite hydronephrosis.      CT-HEAD W/O   Final Result      No definite acute intracranial abnormality.               MR-THORACIC SPINE-WITH & W/O   Final Result      1.  Minor degenerative changes with multilevel tiny ventral extradural defects consistent with disc bulges or disc/osteophyte change at T2-T3, T5-T6, T6-T7, T8-T9. There is no cord impingement or central stenosis at any thoracic level.   2.  No myelopathic cord signal.      MR-CERVICAL SPINE-WITH & W/O   Final Result      1.  C5-6 mild left-sided inferior foraminal stenosis due to uncinate hypertrophy.   2.  C6-7 mild disc bulging. No central stenosis or foraminal stenosis.   3.  No myelopathic cord signal.      MR-LUMBAR SPINE-W/O   Final Result      1.  L4-5 moderate-sized central disc protrusion slightly favoring the left with small components of cephalad and caudad extrusion. Focal impression on the ventral thecal sac without central stenosis. Slight interval increase since prior exam. Borderline    left lateral recess stenosis. Borderline bilateral foraminal stenosis.   2.  L5-S1 mild broad-based central disc bulging. No central stenosis or significant foraminal stenosis.      IR-US GUIDED PIV    (Results Pending)   EC-ECHOCARDIOGRAM COMPLETE W/O CONT    (Results Pending)        Assessment/Plan  * Intractable low back pain- (present on admission)  Assessment & Plan  3/2/2025  Secondary to L4-L5 disc herniation. Associated symptoms of urinary incontinence and BLE numbness and tingling. MRI lumbar spine shows moderate-sized central disc protrusion with slight interval increase since prior exam.  Multimodal pain management-scheduled IV Toradol, scheduled Robaxin, scheduled Tylenol, scheduled lidocaine patch, scheduled Medrol Dosepak, scheduled gabapentin, as needed IV and p.o. analgesics  Neurosurgery consulted at this point recommending MRI C-spine/T-spine. Findings of the MRI of the lumbar spine without causing symptoms  per neurosurgery.  Postvoid residual 28 cc.  Patient not retaining at this time.    Allergic rhinitis  Assessment & Plan  3/2/2025  Diphenhydramine PRN    Alcohol use disorder  Assessment & Plan  3/2/2025  Drinks 1 drink daily, occasionally more than 1 drink daily but no more than 2-3 drinks daily.  Encouraged cessation    Hematuria  Assessment & Plan  3/2/2025  Incidental finding on UA. Not associated with LUTS.    History of B-cell lymphoma- (present on admission)  Assessment & Plan  3/2/2025  Dx 2017. In remission.  Follow-up with oncology Dr. Theodore as outpatient    Chronic bilateral low back pain with bilateral sciatica- (present on admission)  Assessment & Plan  3/2/2025  Follow-up with pain management Dr. Nguyen and neurology as outpatient         VTE prophylaxis: Lovenox    I have performed a physical exam and reviewed and updated ROS and Plan today (3/2/2025). In review of yesterday's note (3/1/2025), there are no changes except as documented above.    Greater than 50 minutes spent prepping to see patient (e.g. review of tests) obtaining and/or reviewing separately obtained history. Performing a medically appropriate examination and/ evaluation.  Counseling and educating the patient/family/caregiver.  Ordering medications, tests, or procedures.  Referring and communicating with other health care professionals.  Documenting clinical information in EPIC.  Independently interpreting results and communicating results to patient/family/caregiver.  Care coordination.

## 2025-03-02 NOTE — CARE PLAN
The patient is Watcher - Medium risk of patient condition declining or worsening    Shift Goals  Clinical Goals: pain control, safety, mobility, rest  Patient Goals: pain control, plan of care updates  Family Goals: POC updates    Progress made toward(s) clinical / shift goals:  A&Ox4.  C/o back and hips pain.  Prn oral dilaudid given for pain level of 7/10.  Pt has continuous  which showed O2 sat >90% on RA.      Patient is not progressing towards the following goals:
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
## 2025-03-02 NOTE — CARE PLAN
The patient is Watcher - Medium risk of patient condition declining or worsening    Shift Goals  Clinical Goals: PRS6/10 or less by end of shift. Patient will have regular bowel motility and adequate urine output  Patient Goals: Pain meds  Family Goals: Not present    Progress made toward(s) clinical / shift goals:    Problem: Pain - Standard  Goal: Alleviation of pain or a reduction in pain to the patient’s comfort goal  Outcome: Progressing  Flowsheets (Taken 3/2/2025 1529)  Pain Rating Scale (NPRS): 6  Note: PRS and interventions available discussed with patient. Medicated per order and patient request, discussed long acting pain medication and orders received from MD. Patient tolerating well. Nonpharmacologic interventions offered.     Problem: Knowledge Deficit - Standard  Goal: Patient and family/care givers will demonstrate understanding of plan of care, disease process/condition, diagnostic tests and medications  Outcome: Progressing  Note: Discussed POC and shift goals with patient and primary team. All questions answered as able at this time. Reinforced falls education and policy to have bed alarm in place     Problem: Fall Risk  Goal: Patient will remain free from falls  Outcome: Progressing  Note: Patient remains free from injury and falls this shift. Fall precautions in place. Bed alarm placed this shift.      Problem: Urinary Elimination  Goal: Establish and maintain regular urinary output  Outcome: Progressing  Note: Patient reports have urinary hesitancy, demonstrates good urine output. Hat in place for measuring     Problem: Bowel Elimination  Goal: Establish and maintain regular bowel function  Outcome: Progressing  Note: Patient reports intermittent constipation despite bowel motility yesterday. Discussed with MD and new orders received for daily miralax per patient request.     Problem: Mobility  Goal: Patient's capacity to carry out activities will improve  Outcome:  Progressing  Flowsheets  Taken 3/2/2025 1549  Mobility:   Encouraged mobilization per interdisciplinary team recommendations   Monitored for signs of activity intolerance   Provided assistive devices  Level of Mobility: Level IV  Activity Performed:   Ambulate   Up to bathroom   Back to bed  Time Activity Tolerated: 5 min  # of Times Distance was Traveled: 2  Taken 3/2/2025 0835  Distance Per Occurrence (ft.): 15 feet  Assistance: Standby Assist  Note: Patient up to restroom with staff and use of FWW. Patient demonstrates unsteady, shuffling gait and weakness. Patient had episode of severe dizziness, was very shaky and tachycardic up to 138. Patient escorted back to bed. EKG ordered by MD. Reinforced falls education and bed alarm placed. Patient requesting to have bed alarm removed, advised to keep alarm in place due to earlier dizziness and weakness but that the topic could be re-visited.        Patient is not progressing towards the following goals: NA

## 2025-03-03 ENCOUNTER — APPOINTMENT (OUTPATIENT)
Dept: CARDIOLOGY | Facility: MEDICAL CENTER | Age: 25
DRG: 552 | End: 2025-03-03
Attending: INTERNAL MEDICINE
Payer: COMMERCIAL

## 2025-03-03 ENCOUNTER — APPOINTMENT (OUTPATIENT)
Dept: RADIOLOGY | Facility: MEDICAL CENTER | Age: 25
DRG: 552 | End: 2025-03-03
Attending: INTERNAL MEDICINE
Payer: COMMERCIAL

## 2025-03-03 PROBLEM — F43.21 ADJUSTMENT DISORDER WITH DEPRESSED MOOD: Status: ACTIVE | Noted: 2025-03-03

## 2025-03-03 LAB
ALBUMIN SERPL BCP-MCNC: 4.2 G/DL (ref 3.2–4.9)
BUN SERPL-MCNC: 6 MG/DL (ref 8–22)
CALCIUM ALBUM COR SERPL-MCNC: 8.8 MG/DL (ref 8.5–10.5)
CALCIUM SERPL-MCNC: 9 MG/DL (ref 8.5–10.5)
CHLORIDE SERPL-SCNC: 105 MMOL/L (ref 96–112)
CO2 SERPL-SCNC: 20 MMOL/L (ref 20–33)
CREAT SERPL-MCNC: 0.72 MG/DL (ref 0.5–1.4)
ERYTHROCYTE [DISTWIDTH] IN BLOOD BY AUTOMATED COUNT: 45.5 FL (ref 35.9–50)
GFR SERPLBLD CREATININE-BSD FMLA CKD-EPI: 120 ML/MIN/1.73 M 2
GLUCOSE SERPL-MCNC: 111 MG/DL (ref 65–99)
HCT VFR BLD AUTO: 42.5 % (ref 37–47)
HGB BLD-MCNC: 13.9 G/DL (ref 12–16)
LV EJECT FRACT  99904: 60
LV EJECT FRACT MOD 2C 99903: 46.61
LV EJECT FRACT MOD 4C 99902: 54.7
LV EJECT FRACT MOD BP 99901: 51.13
MAGNESIUM SERPL-MCNC: 2.1 MG/DL (ref 1.5–2.5)
MCH RBC QN AUTO: 30.5 PG (ref 27–33)
MCHC RBC AUTO-ENTMCNC: 32.7 G/DL (ref 32.2–35.5)
MCV RBC AUTO: 93.4 FL (ref 81.4–97.8)
PHOSPHATE SERPL-MCNC: 4.3 MG/DL (ref 2.5–4.5)
PLATELET # BLD AUTO: 286 K/UL (ref 164–446)
PMV BLD AUTO: 9.6 FL (ref 9–12.9)
POTASSIUM SERPL-SCNC: 3.7 MMOL/L (ref 3.6–5.5)
RBC # BLD AUTO: 4.55 M/UL (ref 4.2–5.4)
SODIUM SERPL-SCNC: 137 MMOL/L (ref 135–145)
WBC # BLD AUTO: 10.1 K/UL (ref 4.8–10.8)

## 2025-03-03 PROCEDURE — 700101 HCHG RX REV CODE 250: Performed by: EMERGENCY MEDICINE

## 2025-03-03 PROCEDURE — 85027 COMPLETE CBC AUTOMATED: CPT

## 2025-03-03 PROCEDURE — 93306 TTE W/DOPPLER COMPLETE: CPT | Mod: 26 | Performed by: INTERNAL MEDICINE

## 2025-03-03 PROCEDURE — 80069 RENAL FUNCTION PANEL: CPT

## 2025-03-03 PROCEDURE — A9270 NON-COVERED ITEM OR SERVICE: HCPCS

## 2025-03-03 PROCEDURE — 700111 HCHG RX REV CODE 636 W/ 250 OVERRIDE (IP): Mod: JZ

## 2025-03-03 PROCEDURE — 97110 THERAPEUTIC EXERCISES: CPT

## 2025-03-03 PROCEDURE — 97530 THERAPEUTIC ACTIVITIES: CPT

## 2025-03-03 PROCEDURE — 97116 GAIT TRAINING THERAPY: CPT

## 2025-03-03 PROCEDURE — 99233 SBSQ HOSP IP/OBS HIGH 50: CPT | Performed by: INTERNAL MEDICINE

## 2025-03-03 PROCEDURE — 700102 HCHG RX REV CODE 250 W/ 637 OVERRIDE(OP)

## 2025-03-03 PROCEDURE — 83735 ASSAY OF MAGNESIUM: CPT

## 2025-03-03 PROCEDURE — 770004 HCHG ROOM/CARE - ONCOLOGY PRIVATE *

## 2025-03-03 PROCEDURE — 700102 HCHG RX REV CODE 250 W/ 637 OVERRIDE(OP): Performed by: INTERNAL MEDICINE

## 2025-03-03 PROCEDURE — 700111 HCHG RX REV CODE 636 W/ 250 OVERRIDE (IP): Mod: JZ | Performed by: INTERNAL MEDICINE

## 2025-03-03 PROCEDURE — 93306 TTE W/DOPPLER COMPLETE: CPT

## 2025-03-03 PROCEDURE — A9270 NON-COVERED ITEM OR SERVICE: HCPCS | Performed by: INTERNAL MEDICINE

## 2025-03-03 RX ORDER — METHYLPREDNISOLONE SODIUM SUCCINATE 40 MG/ML
40 INJECTION, POWDER, LYOPHILIZED, FOR SOLUTION INTRAMUSCULAR; INTRAVENOUS ONCE
Status: COMPLETED | OUTPATIENT
Start: 2025-03-03 | End: 2025-03-03

## 2025-03-03 RX ORDER — METHOCARBAMOL 500 MG/1
500 TABLET, FILM COATED ORAL 4 TIMES DAILY
Status: DISCONTINUED | OUTPATIENT
Start: 2025-03-03 | End: 2025-03-04 | Stop reason: HOSPADM

## 2025-03-03 RX ORDER — POTASSIUM CHLORIDE 1500 MG/1
20 TABLET, EXTENDED RELEASE ORAL ONCE
Status: COMPLETED | OUTPATIENT
Start: 2025-03-03 | End: 2025-03-03

## 2025-03-03 RX ORDER — DULOXETIN HYDROCHLORIDE 20 MG/1
20 CAPSULE, DELAYED RELEASE ORAL DAILY
Status: DISCONTINUED | OUTPATIENT
Start: 2025-03-03 | End: 2025-03-04 | Stop reason: HOSPADM

## 2025-03-03 RX ADMIN — LIDOCAINE 1 PATCH: 4 PATCH TOPICAL at 19:48

## 2025-03-03 RX ADMIN — MAGNESIUM HYDROXIDE 30 ML: 2400 SUSPENSION ORAL at 19:59

## 2025-03-03 RX ADMIN — HYDROMORPHONE HYDROCHLORIDE 4 MG: 4 TABLET ORAL at 17:34

## 2025-03-03 RX ADMIN — HYDROMORPHONE HYDROCHLORIDE 4 MG: 4 TABLET ORAL at 12:19

## 2025-03-03 RX ADMIN — GABAPENTIN 300 MG: 300 CAPSULE ORAL at 17:36

## 2025-03-03 RX ADMIN — METHOCARBAMOL 500 MG: 500 TABLET ORAL at 17:36

## 2025-03-03 RX ADMIN — METHOCARBAMOL 500 MG: 500 TABLET ORAL at 14:50

## 2025-03-03 RX ADMIN — HYDROMORPHONE HYDROCHLORIDE 0.5 MG: 1 INJECTION, SOLUTION INTRAMUSCULAR; INTRAVENOUS; SUBCUTANEOUS at 04:47

## 2025-03-03 RX ADMIN — GABAPENTIN 300 MG: 300 CAPSULE ORAL at 04:47

## 2025-03-03 RX ADMIN — SENNOSIDES AND DOCUSATE SODIUM 2 TABLET: 50; 8.6 TABLET ORAL at 17:35

## 2025-03-03 RX ADMIN — ONDANSETRON 4 MG: 2 INJECTION INTRAMUSCULAR; INTRAVENOUS at 09:11

## 2025-03-03 RX ADMIN — SENNOSIDES AND DOCUSATE SODIUM 2 TABLET: 50; 8.6 TABLET ORAL at 04:47

## 2025-03-03 RX ADMIN — HYDROMORPHONE HYDROCHLORIDE 4 MG: 4 TABLET ORAL at 15:05

## 2025-03-03 RX ADMIN — HYDROMORPHONE HYDROCHLORIDE 4 MG: 4 TABLET ORAL at 03:39

## 2025-03-03 RX ADMIN — BACLOFEN 20 MG: 10 TABLET ORAL at 04:47

## 2025-03-03 RX ADMIN — ALPRAZOLAM 0.25 MG: 0.25 TABLET ORAL at 15:30

## 2025-03-03 RX ADMIN — HYDROMORPHONE HYDROCHLORIDE 4 MG: 4 TABLET ORAL at 01:11

## 2025-03-03 RX ADMIN — LAMOTRIGINE 300 MG: 100 TABLET ORAL at 04:47

## 2025-03-03 RX ADMIN — POLYETHYLENE GLYCOL 3350 1 PACKET: 17 POWDER, FOR SOLUTION ORAL at 04:53

## 2025-03-03 RX ADMIN — GABAPENTIN 300 MG: 300 CAPSULE ORAL at 12:19

## 2025-03-03 RX ADMIN — DEXTROAMPHETAMINE SACCHARATE, AMPHETAMINE ASPARTATE, DEXTROAMPHETAMINE SULFATE, AMPHETAMINE SULFATE TABLETS, 10 MG,CLL 20 MG: 2.5; 2.5; 2.5; 2.5 TABLET ORAL at 09:14

## 2025-03-03 RX ADMIN — POTASSIUM CHLORIDE 20 MEQ: 1500 TABLET, EXTENDED RELEASE ORAL at 09:15

## 2025-03-03 RX ADMIN — BACLOFEN 20 MG: 10 TABLET ORAL at 12:19

## 2025-03-03 RX ADMIN — ONDANSETRON 4 MG: 2 INJECTION INTRAMUSCULAR; INTRAVENOUS at 04:51

## 2025-03-03 RX ADMIN — MORPHINE SULFATE 15 MG: 15 TABLET, FILM COATED, EXTENDED RELEASE ORAL at 04:47

## 2025-03-03 RX ADMIN — METHYLPREDNISOLONE SODIUM SUCCINATE 40 MG: 40 INJECTION, POWDER, FOR SOLUTION INTRAMUSCULAR; INTRAVENOUS at 14:50

## 2025-03-03 RX ADMIN — PROCHLORPERAZINE EDISYLATE 10 MG: 5 INJECTION INTRAMUSCULAR; INTRAVENOUS at 19:49

## 2025-03-03 RX ADMIN — METHOCARBAMOL 500 MG: 500 TABLET ORAL at 21:30

## 2025-03-03 RX ADMIN — ONDANSETRON 4 MG: 2 INJECTION INTRAMUSCULAR; INTRAVENOUS at 15:21

## 2025-03-03 RX ADMIN — HYDROMORPHONE HYDROCHLORIDE 4 MG: 4 TABLET ORAL at 07:49

## 2025-03-03 RX ADMIN — HYDROMORPHONE HYDROCHLORIDE 4 MG: 4 TABLET ORAL at 19:48

## 2025-03-03 ASSESSMENT — ENCOUNTER SYMPTOMS
COUGH: 0
DEPRESSION: 0
BLURRED VISION: 0
WEAKNESS: 0
TINGLING: 1
PALPITATIONS: 0
SORE THROAT: 0
DOUBLE VISION: 0
FEVER: 0
ABDOMINAL PAIN: 0
NAUSEA: 0
SHORTNESS OF BREATH: 0
BACK PAIN: 1
CHILLS: 0
VOMITING: 0

## 2025-03-03 ASSESSMENT — COGNITIVE AND FUNCTIONAL STATUS - GENERAL
WALKING IN HOSPITAL ROOM: A LITTLE
CLIMB 3 TO 5 STEPS WITH RAILING: A LITTLE
STANDING UP FROM CHAIR USING ARMS: A LITTLE
SUGGESTED CMS G CODE MODIFIER MOBILITY: CK
MOBILITY SCORE: 18
MOVING TO AND FROM BED TO CHAIR: A LITTLE
TURNING FROM BACK TO SIDE WHILE IN FLAT BAD: A LITTLE
MOVING FROM LYING ON BACK TO SITTING ON SIDE OF FLAT BED: A LITTLE

## 2025-03-03 ASSESSMENT — PAIN DESCRIPTION - PAIN TYPE
TYPE: ACUTE PAIN
TYPE: CHRONIC PAIN
TYPE: ACUTE PAIN
TYPE: ACUTE PAIN
TYPE: CHRONIC PAIN
TYPE: ACUTE PAIN

## 2025-03-03 ASSESSMENT — GAIT ASSESSMENTS
ASSISTIVE DEVICE: FRONT WHEEL WALKER
DISTANCE (FEET): 80
GAIT LEVEL OF ASSIST: STANDBY ASSIST
DEVIATION: BRADYKINETIC

## 2025-03-03 NOTE — CARE PLAN
The patient is Stable - Low risk of patient condition declining or worsening    Shift Goals  Clinical Goals: pain control  Patient Goals: shower, rest  Family Goals: Not present    Progress made toward(s) clinical / shift goals:      Problem: Pain - Standard  Goal: Alleviation of pain or a reduction in pain to the patient’s comfort goal  Description: Target End Date:  Prior to discharge or change in level of care    Document on Vitals flowsheet    1.  Document pain using the appropriate pain scale per order or unit policy  2.  Educate and implement non-pharmacologic comfort measures (i.e. relaxation, distraction, massage, cold/heat therapy, etc.)  3.  Pain management medications as ordered  4.  Reassess pain after pain med administration per policy  5.  If opiods administered assess patient's response to pain medication is appropriate per POSS sedation scale  6.  Follow pain management plan developed in collaboration with patient and interdisciplinary team (including palliative care or pain specialists if applicable)  Outcome: Progressing     Problem: Knowledge Deficit - Standard  Goal: Patient and family/care givers will demonstrate understanding of plan of care, disease process/condition, diagnostic tests and medications  Description: Target End Date:  1-3 days or as soon as patient condition allows  Outcome: Progressing

## 2025-03-03 NOTE — CONSULTS
Palliative Care   Consult received for pain management.  Records reviewed. Ngoc Morales is a 24-year-old female with past medical history significant for lymphoma in remission followed by Dr. Theodore. presented 2/20 3/2025 with symptoms of acute worsening of low back pain and per MRI found to have moderate-sized central disc protrusion with slight interval increase since prior exam.  She is followed outpatient by Dr. Jenelle Nguyen with [facility] and [facility].  Neurosurgery was consulted.  Psychologist following for behavioral therapy given adjustment disorder with depressed mood.    Patient lacks life limiting or terminal diagnosis causing pain warranting palliative care evaluation and management.  Consult declined; discussed with attending provider Dr. Familia RODRIGES.  Recommend non-pharmacologic & multimodal pain management strategies, PM&R consult for recommendations and continuity of care. Please reach out with questions.     DAYNA Mary.  Palliative Care Nurse Practitioner  [phone]

## 2025-03-03 NOTE — DISCHARGE PLANNING
Case Management Discharge Planning    Admission Date: 2/23/2025  GMLOS:    ALOS: 2    6-Clicks ADL Score: 24  6-Clicks Mobility Score: 18      Anticipated Discharge Dispo: Discharge Disposition: D/T to home under [facility] care in anticipation of covered skilled care (06)  Discharge Address: [address] [place] [address] [place], [id number]    DME Needed: Yes    DME Ordered: Yes    Action(s) Taken: OTHER  LMSW called [facility] services to verify pts copay for services. Lul at [facility] reports they are in the process of getting two representatives from Pts insurance carrier to state Pt has full coverage and no co-pay will be required. When this is complete they will reach out to pt and pts mother.    Escalations Completed: Insurance and HH provider    Medically Clear: No    Barriers to Discharge: Medical clearance

## 2025-03-03 NOTE — THERAPY
Physical Therapy   Daily Treatment     Patient Name: Ngoc Morales  Age:  24 y.o., Sex:  female  Medical Record #: [id number]  Today's Date: 3/3/2025     Precautions  Precautions: (P) Fall Risk  Comments: (P) low back pain with paresthesia and radiculopathy    Assessment    Pt was agreeable to PT session.  She was able to mobilize x80' and complete 4 steps x2 trials with SBA, overall quality of movement improved with decreased bracing and UE support noted, no wincing when lifting her legs for the steps.  Pt was shown a core stabilization exercise for sitting and standing. Questions answered about addressing tightness with stretching, heat, massage, rolling on tissue with caution for her osteopenia, PT, and acupuncture.  Pt is most limited by pain.  PT will continue to follow.  Recommend home health PT services.    Plan    Treatment Plan Status: (P) Continue Current Treatment Plan  Type of Treatment: Bed Mobility, Equipment, Family / Caregiver Training, Gait Training, Manual Therapy, Neuro Re-Education / Balance, Self Care / Home Evaluation, Stair Training, Therapeutic Activities, Therapeutic Exercise  Treatment Frequency: 4 Times per Week  Treatment Duration: Until Therapy Goals Met    DC Equipment Recommendations: (P) Front-Wheel Walker (walker delivered to her room- glide caps placed)  Discharge Recommendations: (P) Recommend home health for continued physical therapy services           Objective       03/03/25 1021   Precautions   Precautions Fall Risk   Comments low back pain with paresthesia and radiculopathy   Vitals   O2 Delivery Device None - Room Air   Pain 0 - 10 Group   Therapist Pain Assessment During Activity  (low back pain- not rated)   Cognition    Cognition / Consciousness WDL   Level of Consciousness Alert   Sitting Lower Body Exercises   Comments single leg lfts in sitting and standing position with corrction to amplitude of movement to target the transverse abdominis   Balance   Sitting  Balance (Static) Fair   Sitting Balance (Dynamic) Fair   Standing Balance (Static) Fair   Standing Balance (Dynamic) Fair   Weight Shift Sitting Fair   Weight Shift Standing Fair   Comments with FWW   Bed Mobility    Supine to Sit Supervised   Sit to Supine Supervised   Scooting Supervised   Rolling Supervised   Skilled Intervention Verbal Cuing   Comments bed flat, rail, log roll   Gait Analysis   Gait Level Of Assist Standby Assist   Assistive Device Front Wheel Walker   Distance (Feet) 80   # of Times Distance was Traveled 1   Deviation Bradykinetic   # of Stairs Climbed 4  (x2 trials- sideways with L rail)   Level of Assist with Stairs Standby Assist   Weight Bearing Status FWB BLEs   Skilled Intervention Verbal Cuing;Sequencing   Functional Mobility   Sit to Stand Standby Assist   Bed, Chair, Wheelchair Transfer Standby Assist   Transfer Method Stand Step   Mobility with FWW   Short Term Goals    Short Term Goal # 1 Pt will perform supine < > sit SPV with bed flat, no rail, log rolling in 6 visits in order to set up for upright mobility   Goal Outcome # 1 Goal met   Short Term Goal # 2 Pt will perform sit < > stand SPV in 6 visits in order to prepare for ambulation   Goal Outcome # 2 Goal not met   Short Term Goal # 3 Pt will ambulate 150' SPV /c LRAD in 6 visits in order to return home safely   Goal Outcome # 3 Goal not met   Short Term Goal # 4 Pt will ascend/ descend 5 steps SPV in 6 visits in order to access his/her home   Goal Outcome # 4 Goal not met   Education Group   Education Provided Stair Training;Exercises - Seated   Stair Training Patient Response Patient;Acceptance;Explanation;Action Demonstration;Reinforcement Needed   Exercises - Seated Patient Response Patient;Acceptance;Explanation;Demonstration;Action Demonstration  (single legs lifts, also shown in standing)   Physical Therapy Treatment Plan   Physical Therapy Treatment Plan Continue Current Treatment Plan   Anticipated Discharge Equipment  and Recommendations   DC Equipment Recommendations Front-Wheel Walker  (walker delivered to her room- glide caps placed)   Discharge Recommendations Recommend home health for continued physical therapy services   Interdisciplinary Plan of Care Collaboration   IDT Collaboration with  Nursing   Patient Position at End of Therapy In Bed;Call Light within Reach;Tray Table within Reach   Collaboration Comments RN supervisor updated   Session Information   Date / Session Number  3/3 -3 (3/4, 3/4)

## 2025-03-03 NOTE — ASSESSMENT & PLAN NOTE
3/3/2025  Saw psychiatry here in the hospital  Has outpatient behavioral therapist and psychiatrist.

## 2025-03-03 NOTE — PROGRESS NOTES
[facility] Medicine Daily Progress Note    Date of Service  3/3/2025    Chief Complaint  Ngoc Morales is a 24 y.o. female admitted 2/23/2025 with back pain.    Hospital Course  Ngoc is a 24 y.o. female with a PMH of L4-L5 disc herniation and Hx of lymphoma (in remission), who presented 2/23/2025 with symptoms of acute worsening low back pain.    Patient states that symptoms started after standing up from a squatting position to replace a filter. Seen with mother at bedside. Patient reports she was diagnosed with L4-L5 disc herniation several months prior to current admission, states this is in the setting of osteopenia from prior treatment for her lymphoma, which has been in remission. She is being followed by pain management Dr. Nguyen and [facility]. Reports she was previously able to perform day to day activities as pain was previously resolved, has had persistent low back pain with sciatica since Friday. Pain is sharp and radiates to bilateral hips and bilateral lower extremities, associated with numbness and tingling of BLE, nausea, as well as worsening urinary incontinence that began the evening prior to admission. Reports she was ambulating to the bathroom and experienced complete loss of urine, has never had episodes of urinary incontinence previously. Denies fever, chills, history of IV drug use.      In the ER, VSS. Labs unremarkable. MRI lumbar spine shows moderate-sized central disc protrusion with slight interval increase since prior exam. Received gabapentin, hydromorphone, ketamine, and ondansetron in the ER.     Patient was started on multimodal pain regimen.  I consulted neurosurgery    MRI of cervical and thoracic spine also came back negative for tumor/cord compression.  Neurosurgery recommended nonoperative management  Patient was initially was having some urinary retention but this resolved.  Her postvoid residual has been monitored and it was slow.    Interval Problem Update  Patient is  still having significant pain that oral pain medication is not managing at this time.  No further episodes of urinary continence.  -Plan of care: Multimodal pain management with p.o. and IV medication.  MRI cervical and thoracic spine.  -Disposition: Patient to remain overnight as observation status for further management.    Patient was seen and examined at bedside.  I have personally reviewed and interpreted vitals, labs, and imaging.    2/25.  Afebrile.  Stable vitals.  On room air.  Denies fever, chills or chest pains, shortness of breath.  Patient complains of persistent back pain.  She is upset with the regimen and does not feel that she is progressing.  We notified that she does not need surgery she felt like she was being pushed out the door may have been given a reason for why her back hurts.  Had long discussion with patient with her mom on the phone.  Sweats methocarbamol to baclofen.  Increase gabapentin.  Switch oral oxycodone to oral Dilaudid.  Switch oral oxycodone to oral Dilaudid.  She also requested to be restarted on her oral Adderall and may be going through withdrawals  2/26.  Afebrile.  Stable vitals.  On room air.  Patient states the pain is better controlled on the regimen.  Discussed plan of care with patient and mom over the phone  2/27.  Afebrile.  Stable vitals.  On room air.  Denies fever, chills, chest pain, shortness of breath.  His back pain is improved from yesterday with new regimen but this morning feels like the oral Dilaudid did not last as long as yesterday.  With complaints of neck pain in addition to back pain.  Apparently urology was also planning for kidney scan.  Ordered renal ultrasound.  Counseled about prior urinalysis.  Ordered ESR and CRP inflammatory markers.  Plan of care discussed with patient and mother on the phone.  2/28.  Afebrile.  Stable vitals.  On room air.  Denies fevers, chills, chest pains, shortness of breath.  Still having persistent back pain requiring  IV Dilaudid for breakthrough pain.  She complains that the oral pain medicine does not last long enough.  Counseled her that it is available to her up to every 2 hours and she usually uses it after more than 3.  PT/OT recommended home health.  I had a long conversation with patient and mom over the phone.  Apparently home health company called her mom and they cannot afford home health.  Patient still feels she is in too much pain to get around and take care of herself.  Discussed going to rehab with her.  I did consult physiatry for intensive rehab consideration as well as psychiatry as patient has a lot of anxiety and there is definitely a psychosomatic component to her pain.  3/1.  Afebrile.  Stable vitals.  On room air.  Replete potassium.  Denies fevers, chills, chest pains, shortness of breath.  Still having a lot of back pain.  States his 6 mg of Dilaudid makes her too sleepy.  She would like to go back to the 4 mg for severe pain and 2 mg for moderate pain dose.  Still having a lot of pain to get around out of bed, but she states she is moving faster.  I did encourage ambulation and mobilization.  Patient also complains of some shortness of breath.  States she gets short of breath at rest and just talking.  On exam she is speaking full sentences.  Her lungs are clear on exam.  Patient and mom were insistent on getting echocardiogram.  They are worried about renal ultrasound showing medical renal disease.  On her blood work her kidney function is excellent.  She has had a history of kidney injury from chemotherapy.  Explained to them that even though the kidney function did improve on her blood work, the anatomy is still scarred/changed from prior kidney injury.  She has not had much postvoid residual.  She is still very worried that she is not having enough urine output.  States she can drink 3 of her water bottles at bedside at day and still IV fluids.  Reports only urinating twice a day.  After further  discussion we will start strict ins and outs, daily weights.  She is not swelling and her lungs are clear as above.  Patient's pain has been refractory to oral medications.  Still requiring IV pain medicine.  Titration of regimen expected for the last more than 2 more midnights.  Will roll inpatient  3/2.  Afebrile.  Intermittent hypotension.  On room air.  Denies fevers, chills, chest pains.  Complains of intermittent shortness of breath when she gets up.  States the pain regimen is still not working for her.  Feels like the nausea meds is making her tired.  Ordered more aggressive bowel regimen.  Added long-acting morphine.  Patient was evaluated by psychiatry today.  3/3.  Afebrile.  Intermittent tachycardia.  On room air.    Patient participated in physical therapy today and walk 80 feet.  She also did some stairs.  After reviewing with psychiatry, palliative another hospitalist and a long talk with patient.  While she does have degenerative disc disease, bulging disks and history of lymphoma I feel like a large component of her pain is psychosomatic.  She is already on Adderall and Xanax and that high risk for adverse reactions.  She does follow with a behavioral therapist and psychiatrist as outpatient, as well as Dr. Jenelle Nguyen PM&R.  Talked to patient about tapering opioids.  She had just walked with physical therapy and says she has a severe exacerbation.  I did take off her IV Dilaudid and long-acting morphine.  I did leave her oral morphine for today.  Started duloxetine.  She just finished a Medrol Dosepak taper.  I will give her another one-time dose of IV Solu-Medrol with plans to start another taper tomorrow.  She came in on tramadol and I counseled her about a plan to get back back to her home regimen.  States she would like to go back from the baclofen to her Robaxin she was previously on as outpatient.  Counseled her about plan to transition from oral Dilaudid to oral tramadol tomorrow.  Counseled  her to keep mobilizing and increase activity is much as possible she has made some improvements over the past few days even though her pain remains not controlled.  Had a long discussion with her that opioids would not fix her pain and she is at very high risk of adverse effects and overdose.  I also discussed plan of care with her mom over the phone.    I have discussed this patient's plan of care and discharge plan at IDT rounds today with Case Management, Nursing, Nursing leadership, and other members of the IDT team.    Consultants/Specialty  neurosurgery    Code Status  Full Code    Disposition  The patient is not medically cleared for discharge to home or a post-acute facility.  Anticipate discharge to: home with organized home healthcare and close outpatient follow-up    I have placed the appropriate orders for post-discharge needs.    Review of Systems  Review of Systems   Constitutional:  Negative for chills, fever and malaise/fatigue.   HENT:  Negative for congestion and sore throat.    Eyes:  Negative for blurred vision and double vision.   Respiratory:  Negative for cough and shortness of breath.    Cardiovascular:  Negative for chest pain, palpitations and leg swelling.   Gastrointestinal:  Negative for abdominal pain, nausea and vomiting.   Genitourinary:  Negative for dysuria and hematuria.   Musculoskeletal:  Positive for back pain.   Skin:  Negative for itching and rash.   Neurological:  Positive for tingling. Negative for weakness.        Numbness and tingling BLE   Psychiatric/Behavioral:  Negative for depression and suicidal ideas.         Physical Exam  Temp:  [36.2 °C (97.2 °F)-36.8 °C (98.3 °F)] 36.2 °C (97.2 °F)  Pulse:  [] 80  Resp:  [14-20] 18  BP: (101-126)/(56-86) 101/56  SpO2:  [88 %-97 %] 94 %    Physical Exam  Constitutional:       Appearance: Normal appearance.   HENT:      Head: Normocephalic and atraumatic.      Nose: Nose normal.      Mouth/Throat:      Mouth: Mucous  membranes are moist.   Eyes:      Pupils: Pupils are equal, round, and reactive to light.   Cardiovascular:      Rate and Rhythm: Normal rate and regular rhythm.      Pulses: Normal pulses.      Heart sounds: Normal heart sounds.   Pulmonary:      Effort: Pulmonary effort is normal.      Breath sounds: Normal breath sounds.   Abdominal:      General: Bowel sounds are normal.      Palpations: Abdomen is soft.   Musculoskeletal:         General: Normal range of motion.      Cervical back: Normal range of motion.   Skin:     General: Skin is warm and dry.   Neurological:      General: No focal deficit present.      Mental Status: She is alert. Mental status is at baseline.   Psychiatric:         Mood and Affect: Mood normal.         Behavior: Behavior normal.         Fluids    Intake/Output Summary (Last 24 hours) at 3/3/2025 0556  Last data filed at 3/2/2025 2143  Gross per 24 hour   Intake 1050 ml   Output 2450 ml   Net -1400 ml            Laboratory  Recent Labs     03/01/25  0224 03/02/25  0151 03/03/25  0054   WBC 8.0 7.5 10.1   RBC 4.40 4.38 4.55   HEMOGLOBIN 13.2 13.0 13.9   HEMATOCRIT 41.2 40.3 42.5   MCV 93.6 92.0 93.4   MCH 30.0 29.7 30.5   MCHC 32.0* 32.3 32.7   RDW 45.3 45.7 45.5   PLATELETCT 284 285 286   MPV 9.9 9.5 9.6     Recent Labs     03/01/25  0224 03/02/25  0151 03/03/25  0054   SODIUM 138 140 137   POTASSIUM 3.6 4.1 3.7   CHLORIDE 107 107 105   CO2 21 23 20   GLUCOSE 93 92 111*   BUN 5* 7* 6*   CREATININE 0.52 0.66 0.72   CALCIUM 9.0 9.0 9.0                   Imaging  US-RENAL   Final Result      1.  Kidneys are mildly echogenic concerning for medical renal disease.   2.  Prominent RIGHT extrarenal pelvis without definite hydronephrosis.      CT-HEAD W/O   Final Result      No definite acute intracranial abnormality.               MR-THORACIC SPINE-WITH & W/O   Final Result      1.  Minor degenerative changes with multilevel tiny ventral extradural defects consistent with disc bulges or  disc/osteophyte change at T2-T3, T5-T6, T6-T7, T8-T9. There is no cord impingement or central stenosis at any thoracic level.   2.  No myelopathic cord signal.      MR-CERVICAL SPINE-WITH & W/O   Final Result      1.  C5-6 mild left-sided inferior foraminal stenosis due to uncinate hypertrophy.   2.  C6-7 mild disc bulging. No central stenosis or foraminal stenosis.   3.  No myelopathic cord signal.      MR-LUMBAR SPINE-W/O   Final Result      1.  L4-5 moderate-sized central disc protrusion slightly favoring the left with small components of cephalad and caudad extrusion. Focal impression on the ventral thecal sac without central stenosis. Slight interval increase since prior exam. Borderline    left lateral recess stenosis. Borderline bilateral foraminal stenosis.   2.  L5-S1 mild broad-based central disc bulging. No central stenosis or significant foraminal stenosis.      IR-US GUIDED PIV    (Results Pending)   EC-ECHOCARDIOGRAM COMPLETE W/O CONT    (Results Pending)        Assessment/Plan  * Intractable low back pain- (present on admission)  Assessment & Plan  3/3/2025  Secondary to L4-L5 disc herniation. Associated symptoms of urinary incontinence and BLE numbness and tingling. MRI lumbar spine shows moderate-sized central disc protrusion with slight interval increase since prior exam.  Multimodal pain management-scheduled IV Toradol, scheduled Robaxin, scheduled Tylenol, scheduled lidocaine patch, scheduled Medrol Dosepak, scheduled gabapentin, as needed IV and p.o. analgesics  Neurosurgery consulted at this point recommending MRI C-spine/T-spine. Findings of the MRI of the lumbar spine without causing symptoms per neurosurgery.  Postvoid residual 28 cc.  Patient not retaining at this time.    Adjustment disorder with depressed mood  Assessment & Plan  3/3/2025  Saw psychiatry here in the hospital  Has outpatient behavioral therapist and psychiatrist.    Allergic rhinitis  Assessment &  Plan  3/3/2025  Diphenhydramine PRN    Alcohol use disorder  Assessment & Plan  3/3/2025  Drinks 1 drink daily, occasionally more than 1 drink daily but no more than 2-3 drinks daily.  Encouraged cessation    Hematuria  Assessment & Plan  3/3/2025  Incidental finding on UA. Not associated with LUTS.    History of B-cell lymphoma- (present on admission)  Assessment & Plan  3/3/2025  Dx 2017. In remission.  Follow-up with oncology Dr. Theodore as outpatient    Chronic bilateral low back pain with bilateral sciatica- (present on admission)  Assessment & Plan  3/3/2025  Follow-up with pain management Dr. Nguyen and neurology as outpatient         VTE prophylaxis: Lovenox    I have performed a physical exam and reviewed and updated ROS and Plan today (3/3/2025). In review of yesterday's note (3/2/2025), there are no changes except as documented above.    Greater than 51 minutes spent prepping to see patient (e.g. review of tests) obtaining and/or reviewing separately obtained history. Performing a medically appropriate examination and/ evaluation.  Counseling and educating the patient/family/caregiver.  Ordering medications, tests, or procedures.  Referring and communicating with other health care professionals.  Documenting clinical information in EPIC.  Independently interpreting results and communicating results to patient/family/caregiver.  Care coordination.

## 2025-03-04 ENCOUNTER — PHARMACY VISIT (OUTPATIENT)
Dept: PHARMACY | Facility: MEDICAL CENTER | Age: 25
End: 2025-03-04
Payer: COMMERCIAL

## 2025-03-04 VITALS
HEIGHT: 63 IN | TEMPERATURE: 97.4 F | SYSTOLIC BLOOD PRESSURE: 103 MMHG | HEART RATE: 72 BPM | RESPIRATION RATE: 16 BRPM | BODY MASS INDEX: 24.45 KG/M2 | WEIGHT: 138.01 LBS | DIASTOLIC BLOOD PRESSURE: 56 MMHG | OXYGEN SATURATION: 100 %

## 2025-03-04 PROBLEM — R31.9 HEMATURIA: Status: RESOLVED | Noted: 2025-02-23 | Resolved: 2025-03-04

## 2025-03-04 LAB
ALBUMIN SERPL BCP-MCNC: 4.3 G/DL (ref 3.2–4.9)
BUN SERPL-MCNC: 8 MG/DL (ref 8–22)
CALCIUM ALBUM COR SERPL-MCNC: 9 MG/DL (ref 8.5–10.5)
CALCIUM SERPL-MCNC: 9.2 MG/DL (ref 8.5–10.5)
CHLORIDE SERPL-SCNC: 104 MMOL/L (ref 96–112)
CO2 SERPL-SCNC: 22 MMOL/L (ref 20–33)
CREAT SERPL-MCNC: 0.58 MG/DL (ref 0.5–1.4)
ERYTHROCYTE [DISTWIDTH] IN BLOOD BY AUTOMATED COUNT: 46.6 FL (ref 35.9–50)
GFR SERPLBLD CREATININE-BSD FMLA CKD-EPI: 129 ML/MIN/1.73 M 2
GLUCOSE SERPL-MCNC: 108 MG/DL (ref 65–99)
HCT VFR BLD AUTO: 43.2 % (ref 37–47)
HGB BLD-MCNC: 13.7 G/DL (ref 12–16)
MAGNESIUM SERPL-MCNC: 2.2 MG/DL (ref 1.5–2.5)
MCH RBC QN AUTO: 29.8 PG (ref 27–33)
MCHC RBC AUTO-ENTMCNC: 31.7 G/DL (ref 32.2–35.5)
MCV RBC AUTO: 94.1 FL (ref 81.4–97.8)
PHOSPHATE SERPL-MCNC: 4.2 MG/DL (ref 2.5–4.5)
PLATELET # BLD AUTO: 317 K/UL (ref 164–446)
PMV BLD AUTO: 9.6 FL (ref 9–12.9)
POTASSIUM SERPL-SCNC: 4.2 MMOL/L (ref 3.6–5.5)
RBC # BLD AUTO: 4.59 M/UL (ref 4.2–5.4)
SODIUM SERPL-SCNC: 138 MMOL/L (ref 135–145)
WBC # BLD AUTO: 7.5 K/UL (ref 4.8–10.8)

## 2025-03-04 PROCEDURE — 99239 HOSP IP/OBS DSCHRG MGMT >30: CPT | Performed by: INTERNAL MEDICINE

## 2025-03-04 PROCEDURE — A9270 NON-COVERED ITEM OR SERVICE: HCPCS | Performed by: INTERNAL MEDICINE

## 2025-03-04 PROCEDURE — RXMED WILLOW AMBULATORY MEDICATION CHARGE: Performed by: INTERNAL MEDICINE

## 2025-03-04 PROCEDURE — 80069 RENAL FUNCTION PANEL: CPT

## 2025-03-04 PROCEDURE — 700102 HCHG RX REV CODE 250 W/ 637 OVERRIDE(OP)

## 2025-03-04 PROCEDURE — 700102 HCHG RX REV CODE 250 W/ 637 OVERRIDE(OP): Performed by: INTERNAL MEDICINE

## 2025-03-04 PROCEDURE — 700111 HCHG RX REV CODE 636 W/ 250 OVERRIDE (IP): Mod: JZ

## 2025-03-04 PROCEDURE — 83735 ASSAY OF MAGNESIUM: CPT

## 2025-03-04 PROCEDURE — A9270 NON-COVERED ITEM OR SERVICE: HCPCS

## 2025-03-04 PROCEDURE — 85027 COMPLETE CBC AUTOMATED: CPT

## 2025-03-04 RX ORDER — LIDOCAINE 4 G/G
1 PATCH TOPICAL EVERY 24 HOURS
Qty: 30 PATCH | Refills: 0 | Status: SHIPPED | OUTPATIENT
Start: 2025-03-04

## 2025-03-04 RX ORDER — METHOCARBAMOL 500 MG/1
500 TABLET, FILM COATED ORAL 4 TIMES DAILY
Qty: 120 TABLET | Refills: 0 | Status: SHIPPED | OUTPATIENT
Start: 2025-03-04

## 2025-03-04 RX ORDER — TRAMADOL HYDROCHLORIDE 50 MG/1
50 TABLET ORAL EVERY 4 HOURS PRN
Status: DISCONTINUED | OUTPATIENT
Start: 2025-03-04 | End: 2025-03-04 | Stop reason: HOSPADM

## 2025-03-04 RX ORDER — ERGOCALCIFEROL 1.25 MG/1
50000 CAPSULE, LIQUID FILLED ORAL
Qty: 5 CAPSULE | Refills: 0 | Status: SHIPPED | OUTPATIENT
Start: 2025-03-06

## 2025-03-04 RX ORDER — TRAMADOL HYDROCHLORIDE 50 MG/1
50 TABLET ORAL EVERY 4 HOURS PRN
Qty: 30 TABLET | Refills: 0 | Status: SHIPPED | OUTPATIENT
Start: 2025-03-04 | End: 2025-03-11

## 2025-03-04 RX ORDER — MELOXICAM 15 MG/1
15 TABLET ORAL
Qty: 30 TABLET | Refills: 0 | Status: SHIPPED | OUTPATIENT
Start: 2025-03-04

## 2025-03-04 RX ORDER — ACETAMINOPHEN 500 MG
1000 TABLET ORAL EVERY 6 HOURS PRN
COMMUNITY
Start: 2025-03-04

## 2025-03-04 RX ORDER — GABAPENTIN 300 MG/1
300 CAPSULE ORAL 3 TIMES DAILY
Qty: 90 CAPSULE | Refills: 0 | Status: SHIPPED | OUTPATIENT
Start: 2025-03-04

## 2025-03-04 RX ORDER — DULOXETIN HYDROCHLORIDE 20 MG/1
20 CAPSULE, DELAYED RELEASE ORAL DAILY
Qty: 30 CAPSULE | Refills: 0 | Status: SHIPPED | OUTPATIENT
Start: 2025-03-05 | End: 2025-03-04

## 2025-03-04 RX ADMIN — SENNOSIDES AND DOCUSATE SODIUM 2 TABLET: 50; 8.6 TABLET ORAL at 05:09

## 2025-03-04 RX ADMIN — ONDANSETRON 4 MG: 2 INJECTION INTRAMUSCULAR; INTRAVENOUS at 07:50

## 2025-03-04 RX ADMIN — METHOCARBAMOL 500 MG: 500 TABLET ORAL at 10:04

## 2025-03-04 RX ADMIN — TRAMADOL HYDROCHLORIDE 50 MG: 50 TABLET, COATED ORAL at 10:05

## 2025-03-04 RX ADMIN — HYDROMORPHONE HYDROCHLORIDE 4 MG: 4 TABLET ORAL at 07:49

## 2025-03-04 RX ADMIN — HYDROMORPHONE HYDROCHLORIDE 4 MG: 4 TABLET ORAL at 05:11

## 2025-03-04 RX ADMIN — LAMOTRIGINE 300 MG: 100 TABLET ORAL at 05:09

## 2025-03-04 RX ADMIN — GABAPENTIN 300 MG: 300 CAPSULE ORAL at 05:09

## 2025-03-04 RX ADMIN — POLYETHYLENE GLYCOL 3350 1 PACKET: 17 POWDER, FOR SOLUTION ORAL at 05:09

## 2025-03-04 RX ADMIN — DEXTROAMPHETAMINE SACCHARATE, AMPHETAMINE ASPARTATE, DEXTROAMPHETAMINE SULFATE, AMPHETAMINE SULFATE TABLETS, 10 MG,CLL 20 MG: 2.5; 2.5; 2.5; 2.5 TABLET ORAL at 10:05

## 2025-03-04 RX ADMIN — HYDROMORPHONE HYDROCHLORIDE 4 MG: 4 TABLET ORAL at 03:17

## 2025-03-04 ASSESSMENT — ANXIETY QUESTIONNAIRES
1. FEELING NERVOUS, ANXIOUS, OR ON EDGE: NOT AT ALL
7. FEELING AFRAID AS IF SOMETHING AWFUL MIGHT HAPPEN: NOT AT ALL
GAD7 TOTAL SCORE: 2
4. TROUBLE RELAXING: SEVERAL DAYS
5. BEING SO RESTLESS THAT IT IS HARD TO SIT STILL: SEVERAL DAYS
6. BECOMING EASILY ANNOYED OR IRRITABLE: NOT AT ALL
3. WORRYING TOO MUCH ABOUT DIFFERENT THINGS: NOT AT ALL
2. NOT BEING ABLE TO STOP OR CONTROL WORRYING: NOT AT ALL

## 2025-03-04 ASSESSMENT — PAIN DESCRIPTION - PAIN TYPE
TYPE: CHRONIC PAIN
TYPE: CHRONIC PAIN
TYPE: ACUTE PAIN
TYPE: ACUTE PAIN

## 2025-03-04 ASSESSMENT — PATIENT HEALTH QUESTIONNAIRE - PHQ9
5. POOR APPETITE OR OVEREATING: 0 - NOT AT ALL
CLINICAL INTERPRETATION OF PHQ2 SCORE: 1

## 2025-03-04 NOTE — CONSULTS
"[facility]   Psychotherapy Session Follow up    Name: Ngoc Morales  MRN: [id number]  : 2000  Age: 24 y.o.  Date of assessment: 25  PCP: Pcp Pt States None  Persons in attendance: Patient, MSW Student Intern    HPI Per medical Record:  \"Ngoc is a 24 y.o. female with a PMH of L4-L5 disc herniation and Hx of lymphoma (in remission), who presented 2025 with symptoms of acute worsening low back pain.\"     Referred for Behavioral Health Psychotherapy Follow-up    Psychotherapy Session Summary  Ngoc Morales was seen sitting up in hospital bed talking on phone with mom. Patient was alert, oriented, speech clear and coherent, with no sign of thought disorder. Patient denies audio and visual hallucinations. Patient denies suicidal and homicidal ideation. Patient reported the nurses do not respond for a long time when using her call button. Patient stated, \"the alarm gets so loud and no one comes, they just ignore it.\" Patient reports to feeling tired from no sleep last night due to pain level. Patient reported to be at a level 7 of pain on scale of 1-10. Patient reports her anxiety level is very low today. Patient reported the plan of possibly being discharged today with a pain management doctor appointment tomorrow. Patient reported she will continue to see her psychiatrist and therapist to deal with childhood trauma. Patient discussed her three year old son and looks forward to seeing him once she is discharged.   Clinician offered support and encouragement for continued therapy. Patient thanked clinician for the session and expressed how much better she is feeling today regarding her emotions.    Chief Complaint   Patient presents with    Low Back Pain    Hip Pain          SAFETY ASSESSMENT - SELF  Does patient acknowledge current or past symptoms of dangerousness to self? no   Does parent/significant other report patient has current or past symptoms of dangerousness to " self? N\A     Does presenting problem suggest symptoms of dangerousness to self? No      SAFETY ASSESSMENT - OTHERS  Does patient acknowledge current or past symptoms of aggressive behavior or risk to others? no   Does parent/significant other report patient has current or past symptoms of aggressive behavior or risk to others? N\A     Does presenting problem suggest symptoms of dangerousness to others?  No      Crisis Safety Plan completed and copy given to patient? no      MENTAL STATUS  Participation Active verbal participation   Grooming Casual   Orientation Alert and Fully Oriented   Behavior Calm   Eye contact Good   Mood Euthymic   Affect Full range   Thought Process Goal-directed   Thought Content Within normal limits   Speech Rate within normal limits   Perception Within normal limits   Memory No gross evidence of memory deficits   Insight Adequate   Judgement Adequate   Other      Collateral Information:   Source: [facility]    Unable to complete full assessment due to:  NA - Assessment completed    CLINICAL IMPRESSIONS:  Primary:  Adjustment Disorder with Depressed Mood  Secondary:  R/O Major Depressive Disorder: R/O Borderline Personality Disorder, History of PTSD                                       Recommendations and Observation Level:  Case management, please provide patient with information on outpatient psychotherapy resources prior to discharge.      Legal Hold: N/A    Bessy Rivera, PhD., [facility]  Joy Elias, Student  3/4/2025    Length of Intervention:  30 minutes

## 2025-03-04 NOTE — DISCHARGE INSTRUCTIONS
Do NOT take this pain medication with alcohol or other sedating drugs.  Do NOT drive or operate heavy machinery while taking this medication.  Narcotics can make you constipated so if taking please take with stool softner: miralax twice daily (am/pm), senokot daily, milk of magnesia as needed, fleet enema as needed (can only use once every few days).

## 2025-03-04 NOTE — PROGRESS NOTES
Ngoc Morales has been provided discharge instructions, to include follow up care, home medications, and activity/diet reviewed. Copy of discharge instructions in patient chart, signed and reviewed. Patient verbalizes the understanding of the discharge instructions. PIV removed by bedside RN. Arm band removed. Patient did have home meds during admit, returned to patient. Meds to Beds given. Questions and concerns addressed prior to leaving the discharge lounge. Transported via car, accompanied by mother. Patient discharge to home.

## 2025-03-04 NOTE — CARE PLAN
The patient is Stable - Low risk of patient condition declining or worsening    Shift Goals  Clinical Goals: Patient will have PRS 5/10 or less by end of shift. Patient will have bowel motility and get up to chair  Patient Goals: Attempt to walk in hallways, go outside with visitor  Family Goals: Not present    Progress made toward(s) clinical / shift goals:    Problem: Knowledge Deficit - Standard  Goal: Patient and family/care givers will demonstrate understanding of plan of care, disease process/condition, diagnostic tests and medications  Outcome: Progressing  Note: Discussed POC and shift goals with patient and primary team. All questions answered as able. MD requested to call patient's mom with updates. Discussed PRN pain medications available and plan for PT today     Problem: Fall Risk  Goal: Patient will remain free from falls  Outcome: Progressing  Note: Patient remains free from injury and falls this shift. Fall precautions in place. Patient refusing bed alarm despite education, calls appropriately for assistance     Problem: Bowel Elimination  Goal: Establish and maintain regular bowel function  Outcome: Progressing  Note: No BM this shift, bowel sounds present. Patient has adequate intake of hydration and compliant with scheduled stool softeners.      Problem: Mobility  Goal: Patient's capacity to carry out activities will improve  Outcome: Progressing  Flowsheets  Taken 3/3/2025 1617  Mobility:   Encouraged mobilization per interdisciplinary team recommendations   Monitored for signs of activity intolerance   Provided assistive devices   Administered pain management to allow progressive mobilization   Collaborated with PT/OT  Level of Mobility: Level IV  Activity Performed:   Ambulate   Ambulate to hallway  Taken 3/3/2025 0087  Assistance: Assistance of One  Note: Patient up with 1PA and FWW. Shuffling gait observed. Patient worked with PT this shift, observed ambulating hallways.        Patient is not  progressing towards the following goals:      Problem: Pain - Standard  Goal: Alleviation of pain or a reduction in pain to the patient’s comfort goal  Outcome: Not Progressing: PRS and interventions available discussed with patient. Medicated per order and patient request. Patient continues to report severe pain despite medication, mobilization and ice packs. MD notified and to bedside to discuss pain regimen with patient.

## 2025-03-04 NOTE — CARE PLAN
The patient is Watcher - Medium risk of patient condition declining or worsening    Shift Goals  Clinical Goals: Patient will verbalize tolerable pain level throughout shift  Patient Goals: pain control and d/c tomorrow  Family Goals: n/a    Progress made toward(s) clinical / shift goals:    Problem: Pain - Standard  Goal: Alleviation of pain or a reduction in pain to the patient’s comfort goal  Outcome: Progressing     Problem: Knowledge Deficit - Standard  Goal: Patient and family/care givers will demonstrate understanding of plan of care, disease process/condition, diagnostic tests and medications  Outcome: Progressing  Note: Pt is AO4. Call light within reach. Educated and understand POC. All questions answered at this time.       Patient is not progressing towards the following goals:

## 2025-03-05 ENCOUNTER — OFFICE VISIT (OUTPATIENT)
Dept: PHYSICAL MEDICINE AND REHAB | Facility: MEDICAL CENTER | Age: 25
End: 2025-03-05
Payer: COMMERCIAL

## 2025-03-05 VITALS
TEMPERATURE: 98.1 F | OXYGEN SATURATION: 97 % | WEIGHT: 141.98 LBS | HEART RATE: 92 BPM | SYSTOLIC BLOOD PRESSURE: 111 MMHG | HEIGHT: 63 IN | DIASTOLIC BLOOD PRESSURE: 70 MMHG | BODY MASS INDEX: 25.16 KG/M2

## 2025-03-05 DIAGNOSIS — M54.16 LUMBAR RADICULOPATHY: ICD-10-CM

## 2025-03-05 DIAGNOSIS — G89.29 OTHER CHRONIC PAIN: ICD-10-CM

## 2025-03-05 DIAGNOSIS — R20.2 NUMBNESS AND TINGLING OF BOTH LEGS: ICD-10-CM

## 2025-03-05 DIAGNOSIS — M79.10 MYALGIA: ICD-10-CM

## 2025-03-05 DIAGNOSIS — R20.0 NUMBNESS AND TINGLING OF BOTH LEGS: ICD-10-CM

## 2025-03-05 DIAGNOSIS — M51.26 LUMBAR DISC HERNIATION: ICD-10-CM

## 2025-03-05 DIAGNOSIS — G89.29 CHRONIC BILATERAL LOW BACK PAIN WITHOUT SCIATICA: ICD-10-CM

## 2025-03-05 DIAGNOSIS — M54.50 CHRONIC BILATERAL LOW BACK PAIN WITHOUT SCIATICA: ICD-10-CM

## 2025-03-05 PROCEDURE — 1125F AMNT PAIN NOTED PAIN PRSNT: CPT | Performed by: STUDENT IN AN ORGANIZED HEALTH CARE EDUCATION/TRAINING PROGRAM

## 2025-03-05 PROCEDURE — 3078F DIAST BP <80 MM HG: CPT | Performed by: STUDENT IN AN ORGANIZED HEALTH CARE EDUCATION/TRAINING PROGRAM

## 2025-03-05 PROCEDURE — 76942 ECHO GUIDE FOR BIOPSY: CPT | Performed by: STUDENT IN AN ORGANIZED HEALTH CARE EDUCATION/TRAINING PROGRAM

## 2025-03-05 PROCEDURE — 99214 OFFICE O/P EST MOD 30 MIN: CPT | Mod: 25 | Performed by: STUDENT IN AN ORGANIZED HEALTH CARE EDUCATION/TRAINING PROGRAM

## 2025-03-05 PROCEDURE — 3074F SYST BP LT 130 MM HG: CPT | Performed by: STUDENT IN AN ORGANIZED HEALTH CARE EDUCATION/TRAINING PROGRAM

## 2025-03-05 PROCEDURE — 20553 NJX 1/MLT TRIGGER POINTS 3/>: CPT | Performed by: STUDENT IN AN ORGANIZED HEALTH CARE EDUCATION/TRAINING PROGRAM

## 2025-03-05 RX ORDER — BUPIVACAINE HYDROCHLORIDE 5 MG/ML
5 INJECTION, SOLUTION EPIDURAL; INTRACAUDAL; PERINEURAL ONCE
Status: COMPLETED | OUTPATIENT
Start: 2025-03-05 | End: 2025-03-05

## 2025-03-05 RX ADMIN — BUPIVACAINE HYDROCHLORIDE 5 ML: 5 INJECTION, SOLUTION EPIDURAL; INTRACAUDAL; PERINEURAL at 11:04

## 2025-03-05 RX ADMIN — Medication 5 ML: at 11:04

## 2025-03-05 ASSESSMENT — FIBROSIS 4 INDEX: FIB4 SCORE: 0.27

## 2025-03-05 ASSESSMENT — PATIENT HEALTH QUESTIONNAIRE - PHQ9: CLINICAL INTERPRETATION OF PHQ2 SCORE: 0

## 2025-03-05 ASSESSMENT — PAIN SCALES - GENERAL: PAINLEVEL_OUTOF10: 8=MODERATE-SEVERE PAIN

## 2025-03-05 NOTE — DISCHARGE SUMMARY
Discharge Summary    CHIEF COMPLAINT ON ADMISSION  Chief Complaint   Patient presents with    Low Back Pain    Hip Pain       Reason for Admission  Back pain     Admission Date  2/23/2025    CODE STATUS  FULL    HPI & HOSPITAL COURSE    Ngoc is a 24 y.o. female with a PMH of L4-L5 disc herniation and Hx of lymphoma (in remission), who presented 2/23/2025 with symptoms of acute worsening low back pain.    Patient states that symptoms started after standing up from a squatting position to replace a filter. Seen with mother at bedside. Patient reports she was diagnosed with L4-L5 disc herniation several months prior to current admission, states this is in the setting of osteopenia from prior treatment for her lymphoma, which has been in remission. She is being followed by pain management Dr. Nguyen and [facility]. Reports she was previously able to perform day to day activities as pain was previously resolved, has had persistent low back pain with sciatica since Friday. Pain is sharp and radiates to bilateral hips and bilateral lower extremities, associated with numbness and tingling of BLE, nausea, as well as worsening urinary incontinence that began the evening prior to admission. Reports she was ambulating to the bathroom and experienced complete loss of urine, has never had episodes of urinary incontinence previously. Denies fever, chills, history of IV drug use.      In the ER, VSS. Labs unremarkable. MRI lumbar spine shows moderate-sized central disc protrusion with slight interval increase since prior exam. Received gabapentin, hydromorphone, ketamine, and ondansetron in the ER.     Patient was started on multimodal pain regimen.  I consulted neurosurgery    MRI of cervical and thoracic spine also came back negative for tumor/cord compression.  Neurosurgery recommended nonoperative management  Patient was initially was having some urinary retention but this resolved.  Her postvoid residual has been monitored  and it was normal.     Her pain regimen was:  Tylenol 1000mg q6h  Toradol  Dilaudid PO/IV  Gabapentin 300mg TID  Robaxin 500mg QID  Lidocaine patch  Medrol dose pack    She slowly had improvement in her pain, pain scores remained around 8, however she was able to work with PT and ambulate ~80ft including stairs.  She was transitioned back to her home regimen with close f/u with her pain specialist (currently scheduled 3/5) to discuss further pain meds.  Hospital course c/b constipation, recommended bowel regimen at discharge.  She has h/o bipolar disorder and given severe pain psych was consulted for psychotherapy.  Hospital medicine team discussed starting duloxetine but patient refused since she wanted to talk to her outpatient psych doctor prior to new psych meds.     Therefore, she is discharged in fair and stable condition to home with close outpatient follow-up.    The patient met 2-midnight criteria for an inpatient stay at the time of discharge.    Discharge Date  3/4/2025    FOLLOW UP ITEMS POST DISCHARGE  Pain specialist follow up  F/u with psych    DISCHARGE DIAGNOSES  Principal Problem:    Intractable low back pain (POA: Yes)  Active Problems:    Chronic bilateral low back pain with bilateral sciatica (POA: Yes)      Overview: Onset: 2 months of constant pain, but has had pain for 5-6 years       intermittently      Location: lower back and bilateral hips (R>L)      Duration: constant      Intensity: 5-6/10, can get up to 9-10/10      Radiation: yes, radiating down her lateral legs       Aggravating factors: prolonged sitting/standing, walking up stairs, when       waking in the morning, she has to sit at the edge her bed for 30 minutes       before she can tolerate standing      Alleviating factors: heating pad, chiropractic (but was advised to stop by       Dr. Theodore)      Associated symptoms: tingling in her bilateral feet after prolonged       sitting (onset 2 weeks ago)      OTC remedies: 440mg of  Aleve with only provided 1 hour of relief, Tylenol       Arthritis and IBU both did not help       She has consulted with Dr. Theodore, who recommended taking 2 tablets of       Aleve      Denies saddle anesthesia, or bowel/bladder incontinence.       She has discussed this concern with her oncologist and referred to PT. She       tried doing PT, but reports it made it worse.       Of note, reports that she initially developed lumbar back pain in 2017,       which ultimately led to the diagnosis of her cancer.     History of B-cell lymphoma (POA: Yes)      Overview: Dx in 4/2017 with Diffuse Large B-Cell Lymphoma. S/P chemotherapy. She is       followed by Dr. Theodore annually. Recently had labs in 07/2023 that were       within normal limits.    Alcohol use disorder (POA: Unknown)    Allergic rhinitis (POA: Unknown)    Adjustment disorder with depressed mood (POA: Unknown)  Resolved Problems:    Hematuria (POA: Unknown)      FOLLOW UP  Future Appointments   Date Time Provider Department Center   3/5/2025 10:40 AM [facility]   3/18/2025  8:40 AM Everette Toussaint M.D. 25[facility]  [id number]  [place] [address]  [phone]        Pcp Pt States None            MEDICATIONS ON DISCHARGE     Medication List        START taking these medications        Instructions   acetaminophen 500 MG Tabs  Commonly known as: Tylenol   Take 2 Tablets by mouth every 6 hours as needed for Mild Pain or Moderate Pain.  Dose: 1,000 mg     gabapentin 300 MG Caps  Commonly known as: Neurontin   Take 1 Capsule by mouth 3 times a day.  Dose: 300 mg     lidocaine 4 % Ptch  Commonly known as: Asperflex   Place 1 Patch on the skin every 24 hours. (12 hours on, 12 hours off)  Dose: 1 Patch     vitamin D2 (Ergocalciferol) 1.25 MG (61508 UT) Caps capsule  Start taking on: March 6, 2025  Commonly known as: Drisdol   Take 1 Capsule by mouth every 7 days.  Dose: 50,000 Units             CHANGE how you take these medications        Instructions   methocarbamol 500 MG Tabs  What changed:   medication strength  how much to take  when to take this  reasons to take this  Commonly known as: Robaxin   Take 1 Tablet by mouth 4 times a day.  Dose: 500 mg            CONTINUE taking these medications        Instructions   ALPRAZolam 0.25 MG Tabs  Commonly known as: Xanax   Take 0.25 mg by mouth 1 time a day as needed for Sleep or Anxiety.  Dose: 0.25 mg     amphetamine-dextroamphetamine 30 MG tablet  Commonly known as: Adderall   Take 30 mg by mouth every day.  Dose: 30 mg     lamotrigine 150 MG tablet  Commonly known as: LaMICtal   Take 300 mg by mouth every day. 2 tablets= 300mg  Dose: 300 mg     meloxicam 15 MG tablet  Commonly known as: Mobic   Take 1 Tablet by mouth 1 time a day as needed for Mild Pain or Moderate Pain.  Dose: 15 mg     traMADol 50 MG Tabs  Commonly known as: Ultram   Take 1 Tablet by mouth every four hours as needed for Mild Pain for up to 7 days.  Dose: 50 mg              Allergies  Allergies   Allergen Reactions    Bactrim [Sulfamethoxazole W-Trimethoprim] Vomiting and Nausea       DIET  No orders of the defined types were placed in this encounter.      ACTIVITY  As tolerated.  Weight bearing as tolerated    CONSULTATIONS  Psychiatry     PROCEDURES  None    Discharge exam:  Physical Exam  Constitutional:       General: She is not in acute distress.     Appearance: She is not ill-appearing, toxic-appearing or diaphoretic.   HENT:      Head: Normocephalic and atraumatic.      Mouth/Throat:      Mouth: Mucous membranes are moist.   Eyes:      General: No scleral icterus.     Conjunctiva/sclera: Conjunctivae normal.   Cardiovascular:      Rate and Rhythm: Normal rate and regular rhythm.      Heart sounds: No murmur heard.     No friction rub. No gallop.   Pulmonary:      Effort: Pulmonary effort is normal.      Breath sounds: Normal breath sounds.   Abdominal:      General: Bowel  sounds are normal. There is no distension.      Palpations: Abdomen is soft.      Tenderness: There is no abdominal tenderness.   Musculoskeletal:      Cervical back: Normal range of motion.      Right lower leg: No edema.      Left lower leg: No edema.   Skin:     Coloration: Skin is not jaundiced.      Findings: No rash.   Neurological:      Mental Status: She is alert and oriented to person, place, and time.   Psychiatric:         Mood and Affect: Mood normal.         Behavior: Behavior normal.           LABORATORY  Lab Results   Component Value Date    SODIUM 138 2025    POTASSIUM 4.2 2025    CHLORIDE 104 2025    CO2 22 2025    GLUCOSE 108 (H) 2025    BUN 8 2025    CREATININE 0.58 2025        Lab Results   Component Value Date    WBC 7.5 2025    HEMOGLOBIN 13.7 2025    HEMATOCRIT 43.2 2025    PLATELETCT 317 2025        EC-ECHOCARDIOGRAM COMPLETE W/O CONT  Transthoracic  Echo Report    Echocardiography Laboratory    CONCLUSIONS  The left ventricular ejection fraction is visually estimated to be 60%.  Normal regional wall motion.  The right ventricle is normal in size and systolic function.  No significant valvular disease.     EFEMARIA FERNANDAMAHADMARIO  Exam Date:         2025                      11:39  Exam Location:     Inpatient  Priority:          Routine    Ordering Physician:        JOSHUA BUENO  Referring Physician:       XIMENA Arzate  Sonographer:               Rachael Hurtado RDCS    Age:    24     Gender:    F  MRN:    [id number]  :    2000  BSA:    1.65   Ht (in):    63     Wt (lb):    138  Exam Type:     Complete    Indications:     Shortness of breath  ICD Codes:       R06.02    CPT Codes:       59943    BP:   114    /   66     HR:   95  Technical Quality:       Fair    MEASUREMENTS  (Male / Female) Normal Values  2D ECHO  LV Diastolic Diameter PLAX        3.6 cm                4.2 - 5.9 / 3.9 - 5.3   cm  LV Systolic Diameter  PLAX         2.7 cm                2.1 - 4.0 cm  IVS Diastolic Thickness           0.9 cm                  LVPW Diastolic Thickness          0.8 cm                  LVOT Diameter                     1.9 cm                  Estimated LV Ejection Fraction    60 %                    LV Ejection Fraction MOD BP       51.1 %                >= 55  %  LV Ejection Fraction MOD 4C       54.7 %                  LV Ejection Fraction MOD 2C       46.6 %                  LV Ejection Fraction 4C AL        57.1 %                  LV Ejection Fraction 2C AL        47 %                    LA Volume Index                   7.6 cm3/m2            16 - 28 cm3/m2    DOPPLER  AV Peak Velocity                  1 m/s                   AV Peak Gradient                  4.3 mmHg                AV Mean Gradient                  2 mmHg                  LVOT Peak Velocity                1 m/s                   AV Area Cont Eq vti               2.9 cm2                 Mitral E Point Velocity           0.61 m/s                Mitral E to A Ratio               1.1                     MV Pressure Half Time             37 ms                   MV Area PHT                       5.9 cm2                 MV Deceleration Time              127 ms                  PV Peak Velocity                  1.4 m/s                 PV Peak Gradient                  7.5 mmHg                RVOT Peak Velocity                0.92 m/s                LV E' Lateral Velocity            9.8 cm/s                Mitral E to LV E' Lateral Ratio   6.3                     LV E' Septal Velocity             9.5 cm/s                Mitral E to LV E' Septal Ratio    6.5                       * Indicates values subject to auto-interpretation  LV EF:  60    %    FINDINGS  Left Ventricle  Normal left ventricular chamber size. Normal left ventricular wall   thickness. Normal left ventricular systolic function. The left   ventricular ejection fraction is visually estimated to be 60%. Normal    regional wall motion. Normal diastolic function.    Right Ventricle  The right ventricle is normal in size and systolic function.    Right Atrium  The right atrium is normal in size. Normal inferior vena cava size   without inspiratory collapse.    Left Atrium  The left atrium is normal in size. Left atrial volume index is 7 mL/sq   m.    Mitral Valve  Structurally normal mitral valve without significant stenosis or   regurgitation.    Aortic Valve  Structurally normal aortic valve without significant stenosis or   regurgitation.    Tricuspid Valve  Structurally normal tricuspid valve without significant stenosis or   regurgitation. Unable to estimate right ventricular systolic pressure   due to an inadequate tricuspid regurgitant jet. Right atrial pressure   is estimated to be 8 mmHg.    Pulmonic Valve  Structurally normal pulmonic valve without significant stenosis or   regurgitation.    Pericardium  No pericardial effusion.    Aorta  Normal aortic root for body surface area. The ascending aorta diameter   is 2.4 cm.    Thierno Loyola MD  (Electronically Signed)  Final Date:     03 March 2025                   13:29    US-RENAL   Final Result       1.  Kidneys are mildly echogenic concerning for medical renal disease.   2.  Prominent RIGHT extrarenal pelvis without definite hydronephrosis.       CT-HEAD W/O   Final Result       No definite acute intracranial abnormality.                   MR-THORACIC SPINE-WITH & W/O   Final Result       1.  Minor degenerative changes with multilevel tiny ventral extradural defects consistent with disc bulges or disc/osteophyte change at T2-T3, T5-T6, T6-T7, T8-T9. There is no cord impingement or central stenosis at any thoracic level.   2.  No myelopathic cord signal.       MR-CERVICAL SPINE-WITH & W/O   Final Result       1.  C5-6 mild left-sided inferior foraminal stenosis due to uncinate hypertrophy.   2.  C6-7 mild disc bulging. No central stenosis or foraminal  stenosis.   3.  No myelopathic cord signal.       MR-LUMBAR SPINE-W/O   Final Result       1.  L4-5 moderate-sized central disc protrusion slightly favoring the left with small components of cephalad and caudad extrusion. Focal impression on the ventral thecal sac without central stenosis. Slight interval increase since prior exam. Borderline    left lateral recess stenosis. Borderline bilateral foraminal stenosis.   2.  L5-S1 mild broad-based central disc bulging. No central stenosis or significant foraminal stenosis.           Total time of the discharge process exceeds 36 minutes.

## 2025-03-05 NOTE — H&P (VIEW-ONLY)
Verbal consent was acquired by the patient to use [employer] ambient listening note generation during this visit Yes     Follow-up patient Note    Interventional Pain and Spine  Physiatry (Physical Medicine and Rehabilitation)     Patient Name: Ngoc Morales  : 2000  Date of service: 3/5/2025    Chief Complaint:   Chief Complaint   Patient presents with    Follow-Up     Severe back pain       HISTORY  Please see new patient note by Dr. Nguyen for more details.     HPI  Today's visit   Ngoc Morales ( 2000) is a female with Diagnoses of Myalgia, Lumbar radiculopathy, Numbness and tingling of both legs, Chronic bilateral low back pain without sciatica, and Other chronic pain were pertinent to this visit.    History of Present Illness  The patient presents for evaluation of back pain.    She reports a significant exacerbation of her chronic back pain, which began after an incident at her mother's house on the Friday before last Monday. She experienced difficulty in standing up correctly, leading to a flare-up of her usual symptoms. The following day, she noticed a decrease in mobility and spent the majority of the day in bed due to severe pain. On , she was unable to move and had an episode of urinary incontinence while attempting to reach the bathroom. She called an ambulance and was taken to the ER. An MRI revealed bulging discs in her back, but the medical team did not attribute her pain to this finding. She describes her pain as immense, rating it between 6 and 9 on a scale of 10. The pain is primarily located in her lower back and hips, with occasional radiation to the front of her hips when the pain intensifies. She also experiences a dull, aching pain in her hip bone and a sharp, stabbing pain in her back.  She has been using a back brace and a walker for mobility. She has not received an epidural injection and has not consulted a neurologist. She has never received trigger point  injections. She has been diagnosed with osteopenia and has undergone a nerve test, which returned normal results. She was prescribed Dilaudid during her hospital stay but was discharged without any pain medication. She has been taking gabapentin 100 mg 2 to 3 times daily, which has alleviated most of her numbness and tingling. She also takes tramadol, prescribed by Dr. Alvarenga for wrist and knee pain, but finds it ineffective for her back pain. She was also given a 5-day course of oral steroids, which were gradually tapered off, and a single strong dose of intravenous steroids. Prior to this flare-up, she was taking meloxicam and methocarbamol as needed, and tramadol for severe pain at night. She found the muscle relaxer to be the most effective treatment.    She also reported a change in the numbness in her legs, describing a dead feeling from her hip to her knee and a static, tingling sensation from her knees to her feet. She continues to experience difficulty with urination, requiring significant effort to initiate the stream, which results in muscle pain due to the use of her core muscles. A kidney scan revealed damage likely secondary to chemotherapy. She is under the care of a urologist and plans to schedule an appointment next week.    MEDICATIONS  Current: gabapentin, tramadol, meloxicam, methocarbamol    Medical records reviewed by me at today's visit:   ED discharge note 3/4/25    ROS:   Red Flags ROS:   Fever, Chills, Sweats: Denies  Involuntary Weight Loss: Denies  Bladder Incontinence: Denies  Bowel Incontinence: denies  Saddle Anesthesia: Denies    All other systems reviewed and negative.     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/3/2019    Psychiatric problem     depression, anxiety    Urinary tract infection 5/3/2019       PSHx:   Past Surgical History:    Procedure Laterality Date    CATH REMOVAL  9/21/2017    Procedure: CATH REMOVAL- PORT;  Surgeon: Yoli Grullon M.D.;  Location: SURGERY [facility];  Service: General    GASTROSCOPY-ENDO  4/28/2017    Procedure: GASTROSCOPY-ENDO;  Surgeon: Everette Jett M.D.;  Location: ENDOSCOPY [facility];  Service:     CATH PLACEMENT Left 4/13/2017    Procedure: PORT PLACEMENT  ;  Surgeon: Yoli Grullon M.D.;  Location: SURGERY [facility];  Service:     BIOPSY GENERAL N/A 4/13/2017    Procedure: BONE MARROW BIOPSY AND ASPIRATION; LUMBAR PUNCTURE  ;  Surgeon: Yoli Grullon M.D.;  Location: SURGERY [facility];  Service:        Family Hx:   History reviewed. No pertinent family history.    Social Hx:  Social History     Socioeconomic History    Marital status: Single     Spouse name: Not on file    Number of children: Not on file    Years of education: Not on file    Highest education level: Not on file   Occupational History    Not on file   Tobacco Use    Smoking status: Never    Smokeless tobacco: Former    Tobacco comments:     Vapes nicotine   Vaping Use    Vaping status: Every Day    Substances: THC   Substance and Sexual Activity    Alcohol use: Yes     Alcohol/week: 2.4 oz     Types: 2 Glasses of wine, 2 Cans of beer per week    Drug use: Yes     Comment: marijuana    Sexual activity: Not on file   Other Topics Concern    Not on file   Social History Narrative    Not on file     Social Drivers of Health     Financial Resource Strain: Not on file   Food Insecurity: No Food Insecurity (2/23/2025)    Hunger Vital Sign     Worried About Running Out of Food in the Last Year: Never true     Ran Out of Food in the Last Year: Never true   Transportation Needs: No Transportation Needs (2/23/2025)    PRAPARE - Transportation     Lack of Transportation (Medical): No     Lack of Transportation (Non-Medical): No   Physical Activity: Not on file   Stress: Not on file   Social Connections: Not on file    Intimate Partner Violence: Not At Risk (2/23/2025)    Humiliation, Afraid, Rape, and Kick questionnaire     Fear of Current or Ex-Partner: No     Emotionally Abused: No     Physically Abused: No     Sexually Abused: No   Housing Stability: Low Risk  (2/23/2025)    Housing Stability Vital Sign     Unable to Pay for Housing in the Last Year: No     Number of Times Moved in the Last Year: 0     Homeless in the Last Year: No       Allergies:  Allergies   Allergen Reactions    Bactrim [Sulfamethoxazole W-Trimethoprim] Vomiting and Nausea       Medications: reviewed on epic.   Outpatient Medications Marked as Taking for the 3/5/25 encounter (Office Visit) with Jenelle Nguyen M.D.   Medication Sig Dispense Refill    meloxicam (MOBIC) 15 MG tablet Take 1 Tablet by mouth 1 time a day as needed for Mild Pain or Moderate Pain. 30 Tablet 0    methocarbamol (ROBAXIN) 500 MG Tab Take 1 Tablet by mouth 4 times a day. 120 Tablet 0    traMADol (ULTRAM) 50 MG Tab Take 1 Tablet by mouth every four hours as needed for Mild Pain for up to 7 days. 30 Tablet 0    acetaminophen (TYLENOL) 500 MG Tab Take 2 Tablets by mouth every 6 hours as needed for Mild Pain or Moderate Pain.      gabapentin (NEURONTIN) 300 MG Cap Take 1 Capsule by mouth 3 times a day. 90 Capsule 0    [START ON 3/6/2025] vitamin D2, Ergocalciferol, (DRISDOL) 1.25 MG (66009 UT) Cap capsule Take 1 Capsule by mouth every 7 days. 5 Capsule 0    ALPRAZolam (XANAX) 0.25 MG Tab Take 0.25 mg by mouth 1 time a day as needed for Sleep or Anxiety.      amphetamine-dextroamphetamine (ADDERALL) 30 MG tablet Take 30 mg by mouth every day.      lamotrigine (LAMICTAL) 150 MG tablet Take 300 mg by mouth every day. 2 tablets= 300mg          Current Outpatient Medications on File Prior to Visit   Medication Sig Dispense Refill    meloxicam (MOBIC) 15 MG tablet Take 1 Tablet by mouth 1 time a day as needed for Mild Pain or Moderate Pain. 30 Tablet 0    methocarbamol (ROBAXIN) 500 MG Tab  "Take 1 Tablet by mouth 4 times a day. 120 Tablet 0    traMADol (ULTRAM) 50 MG Tab Take 1 Tablet by mouth every four hours as needed for Mild Pain for up to 7 days. 30 Tablet 0    acetaminophen (TYLENOL) 500 MG Tab Take 2 Tablets by mouth every 6 hours as needed for Mild Pain or Moderate Pain.      gabapentin (NEURONTIN) 300 MG Cap Take 1 Capsule by mouth 3 times a day. 90 Capsule 0    [START ON 3/6/2025] vitamin D2, Ergocalciferol, (DRISDOL) 1.25 MG (94531 UT) Cap capsule Take 1 Capsule by mouth every 7 days. 5 Capsule 0    ALPRAZolam (XANAX) 0.25 MG Tab Take 0.25 mg by mouth 1 time a day as needed for Sleep or Anxiety.      amphetamine-dextroamphetamine (ADDERALL) 30 MG tablet Take 30 mg by mouth every day.      lamotrigine (LAMICTAL) 150 MG tablet Take 300 mg by mouth every day. 2 tablets= 300mg      lidocaine (ASPERFLEX) 4 % Patch Place 1 Patch on the skin every 24 hours. (12 hours on, 12 hours off) (Patient not taking: Reported on 3/5/2025) 30 Patch 0     No current facility-administered medications on file prior to visit.         EXAMINATION     Physical Exam:   /70 (BP Location: Right arm, Patient Position: Sitting, BP Cuff Size: Adult)   Pulse 92   Temp 36.7 °C (98.1 °F) (Temporal)   Ht 1.6 m (5' 3\")   Wt 64.4 kg (141 lb 15.6 oz)   SpO2 97%     Constitutional:   Body Habitus: Body mass index is 25.15 kg/m².  Cooperation: Fully cooperates with exam  Appearance: Well-groomed, well-nourished.    Eyes: No scleral icterus to suggest severe liver disease, no proptosis to suggest severe hyperthyroidism    ENT -no obvious auditory deficits, no noticeable facial droop     Skin -no rashes or lesions noted     Respiratory-  breathing comfortably on room air, no audible wheezing    Cardiovascular-distal extremities warm and well perfused.  No lower extremity edema is noted.     Gastrointestinal - no obvious abdominal masses, non-distended    Psychiatric- alert and oriented ×3. Normal affect.     Gait stable, " steady      Thoracic/Lumbar Spine/Sacral Spine/Hips   Inspection: No evidence of atrophy in bilateral lower extremities throughout      Palpation:   Tenderness to palpation over the  bilateral glutes, bilateral lumbar paraspinal muscles, bilateral thoracic paraspinal muscles reproducing patient's pain.     Lumbar spine /hip provocative exam maneuvers  Straight leg raise positive bilaterally for reproducing right gluteal pain  FADIR test negative bilaterally     SI joint tests  GALA test negative bilaterally     Key points for the international standards for neurological classification of spinal cord injury (ISNCSCI) to light touch.   Dermatome R L   L2 2 2   L3 2 2   L4 1 1   L5 1 1   S1 1 1   S2 2 2         Motor Exam Lower Extremities  ? Myotome R L   Hip flexion L2 5 5   Knee extension L3 5 5   Ankle dorsiflexion L4 5 5   Toe extension L5 5 5   Ankle plantarflexion S1 5 5           MEDICAL DECISION MAKING    Medical records review: see under HPI section.     DATA    Labs: No new labs available for review since last visit.   Lab Results   Component Value Date/Time    SODIUM 138 03/04/2025 04:29 AM    POTASSIUM 4.2 03/04/2025 04:29 AM    CHLORIDE 104 03/04/2025 04:29 AM    CO2 22 03/04/2025 04:29 AM    ANION 12.0 02/24/2025 01:41 AM    GLUCOSE 108 (H) 03/04/2025 04:29 AM    BUN 8 03/04/2025 04:29 AM    CREATININE 0.58 03/04/2025 04:29 AM    CALCIUM 9.2 03/04/2025 04:29 AM    ASTSGOT 18 11/08/2024 02:46 PM    ALTSGPT 25 11/08/2024 02:46 PM    TBILIRUBIN 0.3 11/08/2024 02:46 PM    ALBUMIN 4.3 03/04/2025 04:29 AM    TOTPROTEIN 7.9 11/08/2024 02:46 PM    GLOBULIN 3.3 11/08/2024 02:46 PM    AGRATIO 1.4 11/08/2024 02:46 PM       Lab Results   Component Value Date/Time    PROTHROMBTM 13.0 04/28/2017 01:19 AM    INR 0.95 04/28/2017 01:19 AM        Lab Results   Component Value Date/Time    WBC 7.5 03/04/2025 02:55 AM    RBC 4.59 03/04/2025 02:55 AM    HEMOGLOBIN 13.7 03/04/2025 02:55 AM    HEMATOCRIT 43.2 03/04/2025 02:55  "AM    MCV 94.1 03/04/2025 02:55 AM    MCH 29.8 03/04/2025 02:55 AM    MCHC 31.7 (L) 03/04/2025 02:55 AM    MPV 9.6 03/04/2025 02:55 AM    NEUTSPOLYS 65.00 02/23/2025 02:59 PM    LYMPHOCYTES 29.20 02/23/2025 02:59 PM    MONOCYTES 4.90 02/23/2025 02:59 PM    EOSINOPHILS 0.50 02/23/2025 02:59 PM    BASOPHILS 0.20 02/23/2025 02:59 PM    HYPOCHROMIA 1+ 06/06/2017 04:40 AM    ANISOCYTOSIS 2+ 06/29/2017 04:00 AM        No results found for: \"HBA1C\"     EMG 12/4/24 with Dr. Chambers  Electrophysiologic Impression:This is a normal study of the bilateral lower extremities. There is no electrophysiologic evidence for large fiber polyneuropathy or lumbosacral radiculopathy.     Imaging:   I personally reviewed following images, these are my reads  MRI lumbar spine 2/23/2025  Disc bulge at L4-5 contributing to mild left lateral recess stenosis and mild bilateral neuroforaminal stenosis.  Disc bulge at L5-S1 with no significant neuroforaminal stenosis. See formal radiology report for further details.    MRI thoracic spine 2/24/2025  No significant central canal stenosis or neuroforaminal stenosis.  See formal radiology report for further details.    MRI cervical spine 2/24/2025  At C5-6 there is mild left neuroforaminal stenosis.  No significant central canal stenosis or neuroforaminal stenosis elsewhere.See formal radiology report for further details.    MRI lumbar spine 8/19/2024  Annular fissure at L5-S1.  Disc bulge at L4-5 and L5-S1 without significant neuroforaminal stenosis or central canal stenosis.See formal radiology report for further details.      MRI pelvis 8/19/2024  No evidence of sacroiliitis.  See formal radiology report for further details.      IMAGING radiology reads. I reviewed the following radiology reads                      Results for orders placed in visit on 08/19/24    MR-LUMBAR SPINE-W/O    Impression  Central disc protrusion at L4-5 and L5-S1 without significant canal stenosis or cauda equina " impingement.    Small midline dorsal annular fissure at L5-S1. Annular fissures can represent an independent source of back pain and radicular symptoms by referred pain mechanisms.    Results for orders placed during the hospital encounter of 05/01/19    MR-LUMBAR SPINE-WITH & W/O    Impression  1.  Mild posterior disc bulging again seen at the L4-5 and L5-S1 levels, without evidence for neural compression.  2.  No lumbar spine fracture or segmental malalignment.  3.  No abnormal enhancement within the lumbar spine or spinal canal.    These findings were discussed with FRAN AKHTAR by Dr. Alvarado on the morning of 5/2/2019.      Results for orders placed in visit on 08/19/24    MR-LUMBAR SPINE-W/O    Impression  Central disc protrusion at L4-5 and L5-S1 without significant canal stenosis or cauda equina impingement.    Small midline dorsal annular fissure at L5-S1. Annular fissures can represent an independent source of back pain and radicular symptoms by referred pain mechanisms.   Results for orders placed during the hospital encounter of 05/01/19    MR-THORACIC SPINE-WITH & W/O    Impression  1.  Multilevel mild disc bulging, without significant neural compression.  2.  No focal marrow edema to indicate fracture.  3.  No focal abnormal marrow signal to indicate tumor.  4.  No abnormal enhancement within the thoracic spine or spinal canal.    These findings were discussed with FRAN AKHTAR by Dr. Alvarado on the morning of 5/2/2019.              Results for orders placed in visit on 08/19/24    MR-PELVIS W/O    Impression  1. No sacroiliitis. No degenerative change or joint effusion.  2. No pulmonary edema or bone marrow replacement.  3. Apparent diffuse wall thickening of the visualized distal colon and rectum.                             Results for orders placed during the hospital encounter of 11/08/18    DX-CHEST-2 VIEWS    Impression  No acute cardiopulmonary disease.                    Results for orders  placed during the hospital encounter of 17    DX-KNEES-AP BILATERAL STANDING    Impression  Slight varus angulation of the right knee joint.    Results for orders placed during the hospital encounter of 23    DX-LUMBAR SPINE-2 OR 3 VIEWS    Impression  Mild degenerative disc disease at L4-5 and L5-S1.  No malalignment.    Results for orders placed during the hospital encounter of 17    DX-PELVIS-1 OR 2 VIEWS    Impression  No acute fracture identified. If symptoms are persistent, MRI would be recommended for further evaluation.                Results for orders placed in visit on 24    DX-WRIST-COMPLETE 3+ LEFT   Results for orders placed in visit on 24    DX-WRIST-LIMITED 2- LEFT        Diagnosis  Visit Diagnoses     ICD-10-CM   1. Myalgia  M79.10   2. Lumbar radiculopathy  M54.16   3. Numbness and tingling of both legs  R20.0    R20.2   4. Chronic bilateral low back pain without sciatica  M54.50    G89.29   5. Other chronic pain  G89.29           ASSESSMENT AND PLAN:  Ngoc Morales (: 2000) is a female with      Ngoc was seen today for follow-up.    Diagnoses and all orders for this visit:    Myalgia  -     Referral to Pain Clinic  -     Consent for all Surgical, Special Diagnostic or Therapeutic Procedures  -     Referral to Physical Therapy  -     Consent for all Surgical, Special Diagnostic or Therapeutic Procedures    Lumbar radiculopathy  -     Referral to Pain Clinic  -     Referral to Physical Therapy    Numbness and tingling of both legs  -     Referral to Physical Therapy    Chronic bilateral low back pain without sciatica  -     Referral to Physical Therapy    Other chronic pain  -     Referral to Physical Therapy    Other orders  -     lidocaine (Xylocaine) 1 % injection 5 mL  -     bupivacaine (pf) (Marcaine/Sensorcaine) 0.5 % injection 5 mL      The patient's back pain is likely due to a combination of factors, including bulging discs at L4-5 and L5-S1,  and potential muscle strain. The pain is described as immense, fluctuating between a 6 and 9 on the pain scale. She has been taking gabapentin 100 mg three times a day, which has helped with numbness and tingling. Tramadol has been less effective, requiring two tablets every 4 hours to manage the pain partially.  I have discussed that the numbness and tingling in her legs could be a side effect of vincristine, a drug used in her chemotherapy.     Imaging:  Personally reviewed at today's visit:    MRI lumbar spine 2/23/2025 and MRI thoracic and cervical spine 2/24/2025, raw data from EMG 12/4/24    Medications:  - of note taking tramadol 2 tabs Q4H for pain relief. Patient notes she was taking this before this recent flare up as prescribed by Dr. Alvarenga  - continue gabapentin 100mg TID. Improving numbness and tingling in legs  - continue meloxicam PRN   - continue methocarbamol QHS PRN     Injections:  - Trigger point injections under ultrasound guidance at today's visit.  We noted that the patient's insurance would not be able to provide us authorization time for today's procedure.  A cost estimate was given.  The patient noted understanding and requested that we proceed with today's injections without insurance coverage due to the severity of her pain.  The risks, benefits, and alternatives to this procedure were discussed and the patient wishes to proceed with the procedure. Risks include but are not limited to damage to surrounding structures, infection, bleeding, worsening of pain which can be permanent, and collapsed lung. Benefits include pain relief and improved function. Alternatives include not doing the procedure.  -Bilateral L5-S1 transforaminal epidural steroid injection under fluoroscopic guidance.  This would be for diagnostic and therapeutic purposes.  The risks, benefits, and alternatives to this procedure were discussed and the patient wishes to proceed with the procedure. Risks include but are not  limited to damage to surrounding structures, infection, bleeding, worsening of pain which can be permanent, and weakness which can be permanent. Benefits include pain relief and improved function. Alternatives include not doing the procedure.      Other:  Follow-up with urology regarding kidney damage and difficulty urinating.  Based on my review of her imaging, there is no significant neuraxial nerve impingement that could explain her difficulty urinating.    Follow-up: 3 weeks after epidural steroid injection or sooner as needed    Orders Placed This Encounter    Referral to Pain Clinic    Referral to Pain Clinic    Referral to Physical Therapy    lidocaine (Xylocaine) 1 % injection 5 mL    bupivacaine (pf) (Marcaine/Sensorcaine) 0.5 % injection 5 mL    Consent for all Surgical, Special Diagnostic or Therapeutic Procedures    Consent for all Surgical, Special Diagnostic or Therapeutic Procedures       Jenelle Nguyen MD  Interventional Pain and Spine  Physical Medicine and Rehabilitation  [facility]    Total time: 57 minutes. I spent greater than 50% of the time for patient care and coordination on this date, including face-to-face time with the patient as per assessment and plan above.     The above note documents my personal evaluation of this patient. In addition, I have reviewed and confirmed with the patient and MA the supportive information documented in today's Patient Health Questionnaire and Office Note.     Please note that this dictation was created using voice recognition software. I have made every reasonable attempt to correct obvious errors, but I expect that there are errors of grammar and possibly content that I did not discover before finalizing the note.

## 2025-03-05 NOTE — PROGRESS NOTES
Verbal consent was acquired by the patient to use Photomedex ambient listening note generation during this visit Yes     Follow-up patient Note    Interventional Pain and Spine  Physiatry (Physical Medicine and Rehabilitation)     Patient Name: Ngoc Morales  : 2000  Date of service: 3/5/2025    Chief Complaint:   Chief Complaint   Patient presents with    Follow-Up     Severe back pain       HISTORY  Please see new patient note by Dr. Nguyen for more details.     HPI  Today's visit   Ngoc Morales ( 2000) is a female with Diagnoses of Myalgia, Lumbar radiculopathy, Numbness and tingling of both legs, Chronic bilateral low back pain without sciatica, and Other chronic pain were pertinent to this visit.    History of Present Illness  The patient presents for evaluation of back pain.    She reports a significant exacerbation of her chronic back pain, which began after an incident at her mother's house on the Friday before last Monday. She experienced difficulty in standing up correctly, leading to a flare-up of her usual symptoms. The following day, she noticed a decrease in mobility and spent the majority of the day in bed due to severe pain. On , she was unable to move and had an episode of urinary incontinence while attempting to reach the bathroom. She called an ambulance and was taken to the ER. An MRI revealed bulging discs in her back, but the medical team did not attribute her pain to this finding. She describes her pain as immense, rating it between 6 and 9 on a scale of 10. The pain is primarily located in her lower back and hips, with occasional radiation to the front of her hips when the pain intensifies. She also experiences a dull, aching pain in her hip bone and a sharp, stabbing pain in her back.  She has been using a back brace and a walker for mobility. She has not received an epidural injection and has not consulted a neurologist. She has never received trigger point  injections. She has been diagnosed with osteopenia and has undergone a nerve test, which returned normal results. She was prescribed Dilaudid during her hospital stay but was discharged without any pain medication. She has been taking gabapentin 100 mg 2 to 3 times daily, which has alleviated most of her numbness and tingling. She also takes tramadol, prescribed by Dr. Alvarenga for wrist and knee pain, but finds it ineffective for her back pain. She was also given a 5-day course of oral steroids, which were gradually tapered off, and a single strong dose of intravenous steroids. Prior to this flare-up, she was taking meloxicam and methocarbamol as needed, and tramadol for severe pain at night. She found the muscle relaxer to be the most effective treatment.    She also reported a change in the numbness in her legs, describing a dead feeling from her hip to her knee and a static, tingling sensation from her knees to her feet. She continues to experience difficulty with urination, requiring significant effort to initiate the stream, which results in muscle pain due to the use of her core muscles. A kidney scan revealed damage likely secondary to chemotherapy. She is under the care of a urologist and plans to schedule an appointment next week.    MEDICATIONS  Current: gabapentin, tramadol, meloxicam, methocarbamol    Medical records reviewed by me at today's visit:   ED discharge note 3/4/25    ROS:   Red Flags ROS:   Fever, Chills, Sweats: Denies  Involuntary Weight Loss: Denies  Bladder Incontinence: Denies  Bowel Incontinence: denies  Saddle Anesthesia: Denies    All other systems reviewed and negative.     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/3/2019    Psychiatric problem     depression, anxiety    Urinary tract infection 5/3/2019       PSHx:   Past Surgical History:    Procedure Laterality Date    CATH REMOVAL  9/21/2017    Procedure: CATH REMOVAL- PORT;  Surgeon: Yoli Grullon M.D.;  Location: SURGERY [facility];  Service: General    GASTROSCOPY-ENDO  4/28/2017    Procedure: GASTROSCOPY-ENDO;  Surgeon: Everette Jett M.D.;  Location: ENDOSCOPY [facility];  Service:     CATH PLACEMENT Left 4/13/2017    Procedure: PORT PLACEMENT  ;  Surgeon: Yoli Grullon M.D.;  Location: SURGERY [facility];  Service:     BIOPSY GENERAL N/A 4/13/2017    Procedure: BONE MARROW BIOPSY AND ASPIRATION; LUMBAR PUNCTURE  ;  Surgeon: Yoli Grullon M.D.;  Location: SURGERY [facility];  Service:        Family Hx:   History reviewed. No pertinent family history.    Social Hx:  Social History     Socioeconomic History    Marital status: Single     Spouse name: Not on file    Number of children: Not on file    Years of education: Not on file    Highest education level: Not on file   Occupational History    Not on file   Tobacco Use    Smoking status: Never    Smokeless tobacco: Former    Tobacco comments:     Vapes nicotine   Vaping Use    Vaping status: Every Day    Substances: THC   Substance and Sexual Activity    Alcohol use: Yes     Alcohol/week: 2.4 oz     Types: 2 Glasses of wine, 2 Cans of beer per week    Drug use: Yes     Comment: marijuana    Sexual activity: Not on file   Other Topics Concern    Not on file   Social History Narrative    Not on file     Social Drivers of Health     Financial Resource Strain: Not on file   Food Insecurity: No Food Insecurity (2/23/2025)    Hunger Vital Sign     Worried About Running Out of Food in the Last Year: Never true     Ran Out of Food in the Last Year: Never true   Transportation Needs: No Transportation Needs (2/23/2025)    PRAPARE - Transportation     Lack of Transportation (Medical): No     Lack of Transportation (Non-Medical): No   Physical Activity: Not on file   Stress: Not on file   Social Connections: Not on file    Intimate Partner Violence: Not At Risk (2/23/2025)    Humiliation, Afraid, Rape, and Kick questionnaire     Fear of Current or Ex-Partner: No     Emotionally Abused: No     Physically Abused: No     Sexually Abused: No   Housing Stability: Low Risk  (2/23/2025)    Housing Stability Vital Sign     Unable to Pay for Housing in the Last Year: No     Number of Times Moved in the Last Year: 0     Homeless in the Last Year: No       Allergies:  Allergies   Allergen Reactions    Bactrim [Sulfamethoxazole W-Trimethoprim] Vomiting and Nausea       Medications: reviewed on epic.   Outpatient Medications Marked as Taking for the 3/5/25 encounter (Office Visit) with Jenelle Nguyen M.D.   Medication Sig Dispense Refill    meloxicam (MOBIC) 15 MG tablet Take 1 Tablet by mouth 1 time a day as needed for Mild Pain or Moderate Pain. 30 Tablet 0    methocarbamol (ROBAXIN) 500 MG Tab Take 1 Tablet by mouth 4 times a day. 120 Tablet 0    traMADol (ULTRAM) 50 MG Tab Take 1 Tablet by mouth every four hours as needed for Mild Pain for up to 7 days. 30 Tablet 0    acetaminophen (TYLENOL) 500 MG Tab Take 2 Tablets by mouth every 6 hours as needed for Mild Pain or Moderate Pain.      gabapentin (NEURONTIN) 300 MG Cap Take 1 Capsule by mouth 3 times a day. 90 Capsule 0    [START ON 3/6/2025] vitamin D2, Ergocalciferol, (DRISDOL) 1.25 MG (03512 UT) Cap capsule Take 1 Capsule by mouth every 7 days. 5 Capsule 0    ALPRAZolam (XANAX) 0.25 MG Tab Take 0.25 mg by mouth 1 time a day as needed for Sleep or Anxiety.      amphetamine-dextroamphetamine (ADDERALL) 30 MG tablet Take 30 mg by mouth every day.      lamotrigine (LAMICTAL) 150 MG tablet Take 300 mg by mouth every day. 2 tablets= 300mg          Current Outpatient Medications on File Prior to Visit   Medication Sig Dispense Refill    meloxicam (MOBIC) 15 MG tablet Take 1 Tablet by mouth 1 time a day as needed for Mild Pain or Moderate Pain. 30 Tablet 0    methocarbamol (ROBAXIN) 500 MG Tab  "Take 1 Tablet by mouth 4 times a day. 120 Tablet 0    traMADol (ULTRAM) 50 MG Tab Take 1 Tablet by mouth every four hours as needed for Mild Pain for up to 7 days. 30 Tablet 0    acetaminophen (TYLENOL) 500 MG Tab Take 2 Tablets by mouth every 6 hours as needed for Mild Pain or Moderate Pain.      gabapentin (NEURONTIN) 300 MG Cap Take 1 Capsule by mouth 3 times a day. 90 Capsule 0    [START ON 3/6/2025] vitamin D2, Ergocalciferol, (DRISDOL) 1.25 MG (49123 UT) Cap capsule Take 1 Capsule by mouth every 7 days. 5 Capsule 0    ALPRAZolam (XANAX) 0.25 MG Tab Take 0.25 mg by mouth 1 time a day as needed for Sleep or Anxiety.      amphetamine-dextroamphetamine (ADDERALL) 30 MG tablet Take 30 mg by mouth every day.      lamotrigine (LAMICTAL) 150 MG tablet Take 300 mg by mouth every day. 2 tablets= 300mg      lidocaine (ASPERFLEX) 4 % Patch Place 1 Patch on the skin every 24 hours. (12 hours on, 12 hours off) (Patient not taking: Reported on 3/5/2025) 30 Patch 0     No current facility-administered medications on file prior to visit.         EXAMINATION     Physical Exam:   /70 (BP Location: Right arm, Patient Position: Sitting, BP Cuff Size: Adult)   Pulse 92   Temp 36.7 °C (98.1 °F) (Temporal)   Ht 1.6 m (5' 3\")   Wt 64.4 kg (141 lb 15.6 oz)   SpO2 97%     Constitutional:   Body Habitus: Body mass index is 25.15 kg/m².  Cooperation: Fully cooperates with exam  Appearance: Well-groomed, well-nourished.    Eyes: No scleral icterus to suggest severe liver disease, no proptosis to suggest severe hyperthyroidism    ENT -no obvious auditory deficits, no noticeable facial droop     Skin -no rashes or lesions noted     Respiratory-  breathing comfortably on room air, no audible wheezing    Cardiovascular-distal extremities warm and well perfused.  No lower extremity edema is noted.     Gastrointestinal - no obvious abdominal masses, non-distended    Psychiatric- alert and oriented ×3. Normal affect.     Gait stable, " steady      Thoracic/Lumbar Spine/Sacral Spine/Hips   Inspection: No evidence of atrophy in bilateral lower extremities throughout      Palpation:   Tenderness to palpation over the  bilateral glutes, bilateral lumbar paraspinal muscles, bilateral thoracic paraspinal muscles reproducing patient's pain.     Lumbar spine /hip provocative exam maneuvers  Straight leg raise positive bilaterally for reproducing right gluteal pain  FADIR test negative bilaterally     SI joint tests  GALA test negative bilaterally     Key points for the international standards for neurological classification of spinal cord injury (ISNCSCI) to light touch.   Dermatome R L   L2 2 2   L3 2 2   L4 1 1   L5 1 1   S1 1 1   S2 2 2         Motor Exam Lower Extremities  ? Myotome R L   Hip flexion L2 5 5   Knee extension L3 5 5   Ankle dorsiflexion L4 5 5   Toe extension L5 5 5   Ankle plantarflexion S1 5 5           MEDICAL DECISION MAKING    Medical records review: see under HPI section.     DATA    Labs: No new labs available for review since last visit.   Lab Results   Component Value Date/Time    SODIUM 138 03/04/2025 04:29 AM    POTASSIUM 4.2 03/04/2025 04:29 AM    CHLORIDE 104 03/04/2025 04:29 AM    CO2 22 03/04/2025 04:29 AM    ANION 12.0 02/24/2025 01:41 AM    GLUCOSE 108 (H) 03/04/2025 04:29 AM    BUN 8 03/04/2025 04:29 AM    CREATININE 0.58 03/04/2025 04:29 AM    CALCIUM 9.2 03/04/2025 04:29 AM    ASTSGOT 18 11/08/2024 02:46 PM    ALTSGPT 25 11/08/2024 02:46 PM    TBILIRUBIN 0.3 11/08/2024 02:46 PM    ALBUMIN 4.3 03/04/2025 04:29 AM    TOTPROTEIN 7.9 11/08/2024 02:46 PM    GLOBULIN 3.3 11/08/2024 02:46 PM    AGRATIO 1.4 11/08/2024 02:46 PM       Lab Results   Component Value Date/Time    PROTHROMBTM 13.0 04/28/2017 01:19 AM    INR 0.95 04/28/2017 01:19 AM        Lab Results   Component Value Date/Time    WBC 7.5 03/04/2025 02:55 AM    RBC 4.59 03/04/2025 02:55 AM    HEMOGLOBIN 13.7 03/04/2025 02:55 AM    HEMATOCRIT 43.2 03/04/2025 02:55  "AM    MCV 94.1 03/04/2025 02:55 AM    MCH 29.8 03/04/2025 02:55 AM    MCHC 31.7 (L) 03/04/2025 02:55 AM    MPV 9.6 03/04/2025 02:55 AM    NEUTSPOLYS 65.00 02/23/2025 02:59 PM    LYMPHOCYTES 29.20 02/23/2025 02:59 PM    MONOCYTES 4.90 02/23/2025 02:59 PM    EOSINOPHILS 0.50 02/23/2025 02:59 PM    BASOPHILS 0.20 02/23/2025 02:59 PM    HYPOCHROMIA 1+ 06/06/2017 04:40 AM    ANISOCYTOSIS 2+ 06/29/2017 04:00 AM        No results found for: \"HBA1C\"     EMG 12/4/24 with Dr. Chambers  Electrophysiologic Impression:This is a normal study of the bilateral lower extremities. There is no electrophysiologic evidence for large fiber polyneuropathy or lumbosacral radiculopathy.     Imaging:   I personally reviewed following images, these are my reads  MRI lumbar spine 2/23/2025  Disc bulge at L4-5 contributing to mild left lateral recess stenosis and mild bilateral neuroforaminal stenosis.  Disc bulge at L5-S1 with no significant neuroforaminal stenosis. See formal radiology report for further details.    MRI thoracic spine 2/24/2025  No significant central canal stenosis or neuroforaminal stenosis.  See formal radiology report for further details.    MRI cervical spine 2/24/2025  At C5-6 there is mild left neuroforaminal stenosis.  No significant central canal stenosis or neuroforaminal stenosis elsewhere.See formal radiology report for further details.    MRI lumbar spine 8/19/2024  Annular fissure at L5-S1.  Disc bulge at L4-5 and L5-S1 without significant neuroforaminal stenosis or central canal stenosis.See formal radiology report for further details.      MRI pelvis 8/19/2024  No evidence of sacroiliitis.  See formal radiology report for further details.      IMAGING radiology reads. I reviewed the following radiology reads                      Results for orders placed in visit on 08/19/24    MR-LUMBAR SPINE-W/O    Impression  Central disc protrusion at L4-5 and L5-S1 without significant canal stenosis or cauda equina " impingement.    Small midline dorsal annular fissure at L5-S1. Annular fissures can represent an independent source of back pain and radicular symptoms by referred pain mechanisms.    Results for orders placed during the hospital encounter of 05/01/19    MR-LUMBAR SPINE-WITH & W/O    Impression  1.  Mild posterior disc bulging again seen at the L4-5 and L5-S1 levels, without evidence for neural compression.  2.  No lumbar spine fracture or segmental malalignment.  3.  No abnormal enhancement within the lumbar spine or spinal canal.    These findings were discussed with FRAN AKHTAR by Dr. Alvarado on the morning of 5/2/2019.      Results for orders placed in visit on 08/19/24    MR-LUMBAR SPINE-W/O    Impression  Central disc protrusion at L4-5 and L5-S1 without significant canal stenosis or cauda equina impingement.    Small midline dorsal annular fissure at L5-S1. Annular fissures can represent an independent source of back pain and radicular symptoms by referred pain mechanisms.   Results for orders placed during the hospital encounter of 05/01/19    MR-THORACIC SPINE-WITH & W/O    Impression  1.  Multilevel mild disc bulging, without significant neural compression.  2.  No focal marrow edema to indicate fracture.  3.  No focal abnormal marrow signal to indicate tumor.  4.  No abnormal enhancement within the thoracic spine or spinal canal.    These findings were discussed with FRAN AKHTAR by Dr. Alvarado on the morning of 5/2/2019.              Results for orders placed in visit on 08/19/24    MR-PELVIS W/O    Impression  1. No sacroiliitis. No degenerative change or joint effusion.  2. No pulmonary edema or bone marrow replacement.  3. Apparent diffuse wall thickening of the visualized distal colon and rectum.                             Results for orders placed during the hospital encounter of 11/08/18    DX-CHEST-2 VIEWS    Impression  No acute cardiopulmonary disease.                    Results for orders  placed during the hospital encounter of 17    DX-KNEES-AP BILATERAL STANDING    Impression  Slight varus angulation of the right knee joint.    Results for orders placed during the hospital encounter of 23    DX-LUMBAR SPINE-2 OR 3 VIEWS    Impression  Mild degenerative disc disease at L4-5 and L5-S1.  No malalignment.    Results for orders placed during the hospital encounter of 17    DX-PELVIS-1 OR 2 VIEWS    Impression  No acute fracture identified. If symptoms are persistent, MRI would be recommended for further evaluation.                Results for orders placed in visit on 24    DX-WRIST-COMPLETE 3+ LEFT   Results for orders placed in visit on 24    DX-WRIST-LIMITED 2- LEFT        Diagnosis  Visit Diagnoses     ICD-10-CM   1. Myalgia  M79.10   2. Lumbar radiculopathy  M54.16   3. Numbness and tingling of both legs  R20.0    R20.2   4. Chronic bilateral low back pain without sciatica  M54.50    G89.29   5. Other chronic pain  G89.29           ASSESSMENT AND PLAN:  Ngoc Morales (: 2000) is a female with      Ngoc was seen today for follow-up.    Diagnoses and all orders for this visit:    Myalgia  -     Referral to Pain Clinic  -     Consent for all Surgical, Special Diagnostic or Therapeutic Procedures  -     Referral to Physical Therapy  -     Consent for all Surgical, Special Diagnostic or Therapeutic Procedures    Lumbar radiculopathy  -     Referral to Pain Clinic  -     Referral to Physical Therapy    Numbness and tingling of both legs  -     Referral to Physical Therapy    Chronic bilateral low back pain without sciatica  -     Referral to Physical Therapy    Other chronic pain  -     Referral to Physical Therapy    Other orders  -     lidocaine (Xylocaine) 1 % injection 5 mL  -     bupivacaine (pf) (Marcaine/Sensorcaine) 0.5 % injection 5 mL      The patient's back pain is likely due to a combination of factors, including bulging discs at L4-5 and L5-S1,  and potential muscle strain. The pain is described as immense, fluctuating between a 6 and 9 on the pain scale. She has been taking gabapentin 100 mg three times a day, which has helped with numbness and tingling. Tramadol has been less effective, requiring two tablets every 4 hours to manage the pain partially.  I have discussed that the numbness and tingling in her legs could be a side effect of vincristine, a drug used in her chemotherapy.     Imaging:  Personally reviewed at today's visit:    MRI lumbar spine 2/23/2025 and MRI thoracic and cervical spine 2/24/2025, raw data from EMG 12/4/24    Medications:  - of note taking tramadol 2 tabs Q4H for pain relief. Patient notes she was taking this before this recent flare up as prescribed by Dr. Alvarenga  - continue gabapentin 100mg TID. Improving numbness and tingling in legs  - continue meloxicam PRN   - continue methocarbamol QHS PRN     Injections:  - Trigger point injections under ultrasound guidance at today's visit.  We noted that the patient's insurance would not be able to provide us authorization time for today's procedure.  A cost estimate was given.  The patient noted understanding and requested that we proceed with today's injections without insurance coverage due to the severity of her pain.  The risks, benefits, and alternatives to this procedure were discussed and the patient wishes to proceed with the procedure. Risks include but are not limited to damage to surrounding structures, infection, bleeding, worsening of pain which can be permanent, and collapsed lung. Benefits include pain relief and improved function. Alternatives include not doing the procedure.  -Bilateral L5-S1 transforaminal epidural steroid injection under fluoroscopic guidance.  This would be for diagnostic and therapeutic purposes.  The risks, benefits, and alternatives to this procedure were discussed and the patient wishes to proceed with the procedure. Risks include but are not  limited to damage to surrounding structures, infection, bleeding, worsening of pain which can be permanent, and weakness which can be permanent. Benefits include pain relief and improved function. Alternatives include not doing the procedure.      Other:  Follow-up with urology regarding kidney damage and difficulty urinating.  Based on my review of her imaging, there is no significant neuraxial nerve impingement that could explain her difficulty urinating.    Follow-up: 3 weeks after epidural steroid injection or sooner as needed    Orders Placed This Encounter    Referral to Pain Clinic    Referral to Pain Clinic    Referral to Physical Therapy    lidocaine (Xylocaine) 1 % injection 5 mL    bupivacaine (pf) (Marcaine/Sensorcaine) 0.5 % injection 5 mL    Consent for all Surgical, Special Diagnostic or Therapeutic Procedures    Consent for all Surgical, Special Diagnostic or Therapeutic Procedures       Jenelle Nguyen MD  Interventional Pain and Spine  Physical Medicine and Rehabilitation  [facility]    Total time: 57 minutes. I spent greater than 50% of the time for patient care and coordination on this date, including face-to-face time with the patient as per assessment and plan above.     The above note documents my personal evaluation of this patient. In addition, I have reviewed and confirmed with the patient and MA the supportive information documented in today's Patient Health Questionnaire and Office Note.     Please note that this dictation was created using voice recognition software. I have made every reasonable attempt to correct obvious errors, but I expect that there are errors of grammar and possibly content that I did not discover before finalizing the note.

## 2025-03-06 NOTE — PROCEDURES
Patient Name: Ngoc Morales  : 2000  Date of Service: 3/5/2025    Physician/s: Jenelle Nguyen MD    Pre-operative Diagnosis: Myalgia (M79.1)    Post-operative Diagnosis: Myalgia (M79.1)    Procedure: trigger point injections of the following muscles:    Site R L   Splenius capitis     Semispinalis capitis     Splenius cervicis     Sternocleidomastoid     Upper trapezius     Levator scapulae     Rhomboids     Pectoralis minor     Pectoralis major     Serratus anterior     Supraspinatus     Infraspinatus     Teres minor     Teres major     Quadratus lumborum     Latissimus dorsi          Paravertebral, cervical     Paravertebral, thoracic x x   Paravertebral, lumbar x x   Gluteus gabrielle x x   Gluteus medius     Gluteus minimus     Tensor fascia charis     Vastus lateralis     Adductor david     Adductor longus     Occipitalis     Cervical paraspinal     Trapezius, mid     Trapezius, lower       Description of procedure:    The risks, benefits, and alternatives of the procedure were reviewed and discussed with the patient.  Written informed consent was freely obtained. A pre-procedural time-out was conducted by the physician verifying patient’s identity, procedure to be performed, procedure site and side, and allergy verification. Appropriate equipment was determined to be in place for the procedure.     In the office suite exam room the patient was placed in a prone position and the skin areas for injection over the above muscles were marked. The areas of pain were then prepped and draped in the usual sterile fashion. A solution was prepared with 5 mL of 1% lidocaine and 5 mL of 0.5% bupivacaine. Ultrasound was confirmed to view the adjacent structures for blood vessels and nerves and to confirm the needle path was not within the structures. A 27g needle was placed into each of the markings at the areas above under ultrasound guidance with an out of plane approach. After negative aspiration, approximately  0.8-1.5 mL of the above solution was injected. The needle was removed intact after each trigger point injection, and the patient's back was covered with a 4x4 gauze, the area was cleansed with sterile normal saline, and a dressing was applied. There were no complications noted. The images were uploaded to our media tab for permanent storage.      Jenelle Nguyen MD  Interventional Pain and Spine  Physical Medicine and Rehabilitation  [facility]

## 2025-03-07 NOTE — Clinical Note
REFERRAL APPROVAL NOTICE         Sent on March 7, 2025                   Ngoc Morales  [address]  [address]  [place] [address]                   Dear Ms. Morales,    After a careful review of the medical information and benefit coverage, [facility] has processed your referral. See below for additional details.    If applicable, you must be actively enrolled with your insurance for coverage of the authorized service. If you have any questions regarding your coverage, please contact your insurance directly.    REFERRAL INFORMATION   Referral #:  [id number]  Referred-To Department    Referred-By Provider:  Physical Medicine and Rehab    GAVI Flores   Physiatry Justyn      [address]  [place] [id number] [phone] [address] [facility]  [place] [address] [phone]    Referral Start Date:  02/24/2025  Referral End Date:   02/24/2026             SCHEDULING  If you do not already have an appointment, please call [phone] to make an appointment.     MORE INFORMATION  If you do not already have a "[employer] account, sign up at: "[email or web address]  You can access your medical information, make appointments, see lab results, billing information, and more.  If you have questions regarding this referral, please contact  the [facility] Referrals department at:             [phone]. Monday - Friday 8:00AM - 5:00PM.     Sincerely,    [facility]

## 2025-03-10 NOTE — Clinical Note
REFERRAL APPROVAL NOTICE         Sent on March 10, 2025                   Ngoc Morales  [address]  [address]  [facility] [address]                   Dear Ms. Morales,    After a careful review of the medical information and benefit coverage, [facility] has processed your referral. See below for additional details.    If applicable, you must be actively enrolled with your insurance for coverage of the authorized service. If you have any questions regarding your coverage, please contact your insurance directly.    REFERRAL INFORMATION   Referral #:  [id number]  Referred-To Department    Referred-By Provider:  Physical Medicine and Rehab    Jenelle Nguyen M.D.   Pain Management       [address]  [address]  [facility] [id number] [phone] [address] [facility] [id number] [phone]    Referral Start Date:  03/06/2025  Referral End Date:   07/04/2025             SCHEDULING  If you do not already have an appointment, please call [phone] to make an appointment.     MORE INFORMATION  If you do not already have a [employer] account, sign up at: [email or web address]  You can access your medical information, make appointments, see lab results, billing information, and more.  If you have questions regarding this referral, please contact  the [facility] Referrals department at:             [phone]. Monday - Friday 8:00AM - 5:00PM.     Sincerely,    [facility]

## 2025-03-11 NOTE — Clinical Note
REFERRAL APPROVAL NOTICE         Sent on March 11, 2025                   Ngoc Morales  [address]  [address]  [facility] [address]                   Dear Ms. Morales,    After a careful review of the medical information and benefit coverage, [facility] has processed your referral. See below for additional details.    If applicable, you must be actively enrolled with your insurance for coverage of the authorized service. If you have any questions regarding your coverage, please contact your insurance directly.    REFERRAL INFORMATION   Referral #:  [id number]  Referred-To Provider    Referred-By Provider:  Physical Therapy    Jenelle Nguyen M.D.   [facility]      [address]  [address]  [place] [address]  [phone] [address] [place] [address], [facility] [address]  [facility] [address]  [phone]    Referral Start Date:  03/05/2025  Referral End Date:   03/05/2026             SCHEDULING  If you do not already have an appointment, please call [phone] to make an appointment.     MORE INFORMATION  If you do not already have a [employer] account, sign up at: [email or web address]  You can access your medical information, make appointments, see lab results, billing information, and more.  If you have questions regarding this referral, please contact  the [facility] Referrals department at:             [phone]. Monday - Friday 8:00AM - 5:00PM.     Sincerely,    [facility]     Clear

## 2025-03-13 ENCOUNTER — HOSPITAL ENCOUNTER (OUTPATIENT)
Facility: MEDICAL CENTER | Age: 25
End: 2025-03-13
Attending: STUDENT IN AN ORGANIZED HEALTH CARE EDUCATION/TRAINING PROGRAM | Admitting: STUDENT IN AN ORGANIZED HEALTH CARE EDUCATION/TRAINING PROGRAM
Payer: COMMERCIAL

## 2025-03-13 ENCOUNTER — APPOINTMENT (OUTPATIENT)
Dept: RADIOLOGY | Facility: MEDICAL CENTER | Age: 25
End: 2025-03-13
Attending: STUDENT IN AN ORGANIZED HEALTH CARE EDUCATION/TRAINING PROGRAM
Payer: COMMERCIAL

## 2025-03-13 VITALS
DIASTOLIC BLOOD PRESSURE: 66 MMHG | HEIGHT: 63 IN | HEART RATE: 88 BPM | WEIGHT: 138.23 LBS | SYSTOLIC BLOOD PRESSURE: 113 MMHG | OXYGEN SATURATION: 97 % | TEMPERATURE: 98.6 F | BODY MASS INDEX: 24.49 KG/M2 | RESPIRATION RATE: 16 BRPM

## 2025-03-13 PROCEDURE — 160048 HCHG OR STATISTICAL LEVEL 1-5: Performed by: STUDENT IN AN ORGANIZED HEALTH CARE EDUCATION/TRAINING PROGRAM

## 2025-03-13 PROCEDURE — 99152 MOD SED SAME PHYS/QHP 5/>YRS: CPT

## 2025-03-13 PROCEDURE — 700111 HCHG RX REV CODE 636 W/ 250 OVERRIDE (IP): Performed by: STUDENT IN AN ORGANIZED HEALTH CARE EDUCATION/TRAINING PROGRAM

## 2025-03-13 PROCEDURE — 160015 HCHG STAT PREOP MINUTES: Performed by: STUDENT IN AN ORGANIZED HEALTH CARE EDUCATION/TRAINING PROGRAM

## 2025-03-13 PROCEDURE — 160028 HCHG SURGERY MINUTES - 1ST 30 MINS LEVEL 3: Performed by: STUDENT IN AN ORGANIZED HEALTH CARE EDUCATION/TRAINING PROGRAM

## 2025-03-13 PROCEDURE — 160046 HCHG PACU - 1ST 60 MINS PHASE II: Performed by: STUDENT IN AN ORGANIZED HEALTH CARE EDUCATION/TRAINING PROGRAM

## 2025-03-13 PROCEDURE — 99152 MOD SED SAME PHYS/QHP 5/>YRS: CPT | Performed by: STUDENT IN AN ORGANIZED HEALTH CARE EDUCATION/TRAINING PROGRAM

## 2025-03-13 PROCEDURE — 160025 RECOVERY II MINUTES (STATS): Performed by: STUDENT IN AN ORGANIZED HEALTH CARE EDUCATION/TRAINING PROGRAM

## 2025-03-13 PROCEDURE — 700117 HCHG RX CONTRAST REV CODE 255: Performed by: STUDENT IN AN ORGANIZED HEALTH CARE EDUCATION/TRAINING PROGRAM

## 2025-03-13 RX ORDER — DEXAMETHASONE SODIUM PHOSPHATE 4 MG/ML
INJECTION, SOLUTION INTRA-ARTICULAR; INTRALESIONAL; INTRAMUSCULAR; INTRAVENOUS; SOFT TISSUE
Status: DISCONTINUED | OUTPATIENT
Start: 2025-03-13 | End: 2025-03-13 | Stop reason: HOSPADM

## 2025-03-13 RX ORDER — LIDOCAINE HYDROCHLORIDE 10 MG/ML
INJECTION, SOLUTION INFILTRATION; PERINEURAL
Status: DISCONTINUED | OUTPATIENT
Start: 2025-03-13 | End: 2025-03-13 | Stop reason: HOSPADM

## 2025-03-13 RX ORDER — MIDAZOLAM HYDROCHLORIDE 1 MG/ML
INJECTION INTRAMUSCULAR; INTRAVENOUS
Status: DISCONTINUED | OUTPATIENT
Start: 2025-03-13 | End: 2025-03-13 | Stop reason: HOSPADM

## 2025-03-13 ASSESSMENT — PAIN DESCRIPTION - PAIN TYPE
TYPE: CHRONIC PAIN
TYPE: CHRONIC PAIN

## 2025-03-13 ASSESSMENT — FIBROSIS 4 INDEX: FIB4 SCORE: 0.27

## 2025-03-13 NOTE — INTERVAL H&P NOTE
Consented Procedure: RIGHT AND LEFT L5-S1 TRANSFORAMINAL EPIDURAL STEROID INJECTION  I have examined the patient, provided the risks, benefits, and alternatives to the procedure(s) indicated on the signed consent form, and the patient wishes to proceed.    H&P reviewed. The patient was examined and there are no changes to the H&P      Jenelle Nguyen M.D.  03/13/25 4:01 PM

## 2025-03-14 ENCOUNTER — TELEPHONE (OUTPATIENT)
Dept: PHYSICAL MEDICINE AND REHAB | Facility: MEDICAL CENTER | Age: 25
End: 2025-03-14
Payer: COMMERCIAL

## 2025-03-14 NOTE — TELEPHONE ENCOUNTER
Called for post-sp check-up. Pt reported the following regarding the procedure site:right and left L5-S1 transforaminal epidural steroid injection with fluoroscopic guidance .      Change in pain?: LVM     Concerns?: LVM     Confirmed [facility] appt?: LVM

## 2025-03-14 NOTE — OP REPORT
Date of Service: 3/13/2025     Patient: Ngoc Morales 24 y.o. female     MRN: [id number]     Physician/s: Jenelle Nguyen MD    Pre-operative Diagnosis: Lumbar radiculopathy    Post-operative Diagnosis: Lumbar radiculopathy    Procedure: Lumbar Transforaminal Epidural Steroid Injection at the  right and left  L5-S1 levels.     Description of procedure:    The risks, benefits, and alternatives of the procedure were reviewed and discussed with the patient.  Written informed consent was freely obtained. A pre-procedural time-out was conducted by the physician verifying patient’s identity, procedure to be performed, procedure site and side, and allergy verification. Appropriate equipment was determined to be in place for the procedure.     Moderation sedation was achieved with Versed (2mg) and Fentanyl (50mcg). Monitoring of the patients vital signs and respiratory status was provided by trained independent registered nurse during the entire course of the procedures and under my supervision and recoded in the patient’s medical record. The duration of sedation was over 10 minutes.     The patient's vital signs were carefully monitored before, throughout, and after the procedure.     In the fluoroscopy suite the patient was placed in a prone position, a pillow placed underneath their umbilicus. The skin was prepped and draped in the usual sterile fashion.     The fluoroscope was placed over the lumbar spine and adjusted into the proper AP/Oblique view to enter the transforaminal space just adjacent to the pedicle at the levels below. The targets for injection were then marked at the right and left L5-S1. A 27g 1.5 inch needle was placed into the marked site, and approximately 1mL of 1% Lidocaine was injected subcutaneously into the epidermal and dermal layers. The needle was removed intact.  A 25g 3.5 inch spinal needle was then placed and advanced under fluoroscopic guidance in an oblique view towards the epidural space  of the levels noted above. The needle position was confirmed to not be past the 6 o'clock position in the AP view and it was in the neuroforamen in the lateral view.     Under live fluoroscopic guidance in the AP view, contrast dye was used to highlight the epidural space spread of each level above. Final fluoroscopic images were saved.  Following negative aspiration, 1.5mL of 1% lidocaine preservative free with 0.5mL of 10mg/mL of dexamethasone was then injected at each level above, and the needles were removed intact after being restyleted. The patient's back was covered with a 4x4 gauze, the area was cleansed with sterile normal saline, and a dressing was applied. There were no complications noted.     The patient was then evaluated post-procedure, and was hemodynamically stable prior to leaving the post-operative care unit.     Follow-up as scheduled    Jenelle Nguyen MD  Interventional Pain and Spine  Physical Medicine and Rehabilitation  [facility]    Pain score prior to injection: 6/10 on NRS  Pain score immediately after injection: 3/10 on NRS    CPT codes  Transforaminal epidural injection- lumbar or sacral (first level):  11337-96  moderate procedural sedation first 15 minutes: 95672

## 2025-04-04 ENCOUNTER — APPOINTMENT (OUTPATIENT)
Dept: PHYSICAL MEDICINE AND REHAB | Facility: MEDICAL CENTER | Age: 25
End: 2025-04-04
Payer: COMMERCIAL

## 2025-04-11 ENCOUNTER — OFFICE VISIT (OUTPATIENT)
Dept: PHYSICAL MEDICINE AND REHAB | Facility: MEDICAL CENTER | Age: 25
End: 2025-04-11
Payer: COMMERCIAL

## 2025-04-11 VITALS
HEIGHT: 63 IN | DIASTOLIC BLOOD PRESSURE: 66 MMHG | SYSTOLIC BLOOD PRESSURE: 111 MMHG | TEMPERATURE: 98.2 F | WEIGHT: 135.8 LBS | OXYGEN SATURATION: 98 % | HEART RATE: 79 BPM | BODY MASS INDEX: 24.06 KG/M2

## 2025-04-11 DIAGNOSIS — M79.10 MYALGIA: ICD-10-CM

## 2025-04-11 DIAGNOSIS — M51.26 LUMBAR DISC HERNIATION: ICD-10-CM

## 2025-04-11 DIAGNOSIS — M54.2 CERVICALGIA: ICD-10-CM

## 2025-04-11 DIAGNOSIS — R20.2 NUMBNESS AND TINGLING OF BOTH LEGS: ICD-10-CM

## 2025-04-11 DIAGNOSIS — M54.16 LUMBAR RADICULOPATHY: ICD-10-CM

## 2025-04-11 DIAGNOSIS — R20.0 NUMBNESS AND TINGLING OF BOTH LEGS: ICD-10-CM

## 2025-04-11 DIAGNOSIS — G89.29 CHRONIC BILATERAL LOW BACK PAIN WITHOUT SCIATICA: ICD-10-CM

## 2025-04-11 DIAGNOSIS — G89.29 OTHER CHRONIC PAIN: ICD-10-CM

## 2025-04-11 DIAGNOSIS — M53.3 SACROILIAC JOINT DYSFUNCTION OF BOTH SIDES: ICD-10-CM

## 2025-04-11 DIAGNOSIS — M54.50 CHRONIC BILATERAL LOW BACK PAIN WITHOUT SCIATICA: ICD-10-CM

## 2025-04-11 PROCEDURE — 1125F AMNT PAIN NOTED PAIN PRSNT: CPT | Performed by: STUDENT IN AN ORGANIZED HEALTH CARE EDUCATION/TRAINING PROGRAM

## 2025-04-11 PROCEDURE — 99214 OFFICE O/P EST MOD 30 MIN: CPT | Performed by: STUDENT IN AN ORGANIZED HEALTH CARE EDUCATION/TRAINING PROGRAM

## 2025-04-11 PROCEDURE — 3074F SYST BP LT 130 MM HG: CPT | Performed by: STUDENT IN AN ORGANIZED HEALTH CARE EDUCATION/TRAINING PROGRAM

## 2025-04-11 PROCEDURE — 3078F DIAST BP <80 MM HG: CPT | Performed by: STUDENT IN AN ORGANIZED HEALTH CARE EDUCATION/TRAINING PROGRAM

## 2025-04-11 ASSESSMENT — PAIN SCALES - GENERAL: PAINLEVEL_OUTOF10: 7=MODERATE-SEVERE PAIN

## 2025-04-11 ASSESSMENT — FIBROSIS 4 INDEX: FIB4 SCORE: 0.27

## 2025-04-11 NOTE — PROGRESS NOTES
Verbal consent was acquired by the patient to use [employer] ambient listening note generation during this visit Yes     Follow-up patient Note    Interventional Pain and Spine  Physiatry (Physical Medicine and Rehabilitation)     Patient Name: Ngoc Morales  : 2000  Date of service: 2025    Chief Complaint:   Chief Complaint   Patient presents with    Follow-Up     Myalgia       HISTORY  Please see new patient note by Dr. Nguyen for more details.     HPI  Today's visit   Ngoc Morales ( 2000) is a female with Diagnoses of Lumbar radiculopathy, Chronic bilateral low back pain without sciatica, Numbness and tingling of both legs, Myalgia, Other chronic pain, Lumbar disc herniation, Sacroiliac joint dysfunction of both sides, and Cervicalgia were pertinent to this visit.    History of Present Illness  The patient is a 24-year-old female who presents for follow-up.    Lower Back Pain  - Reports trigger point injections from last visit provided immediate relief for 3-4 days.  - Epidural injection alleviated 50% of lower back pain starting about 1 week after the injection  -Now she has less pain with bending forward  - Discontinued gabapentin due to mood alterations (irritability, mood swings), mood improved since stopping.  - Continues muscle relaxants as needed, reduced Mobic intake for severe pain.    Shoulder Blade Pain  - Currently experiencing some discomfort mainly at her right upper trapezius, managed with stretching and muscle strengthening exercises.  - Experiencing worsening tightness and pain in shoulder blade area, present since age 13, recently intensified.  - Uncertain cause, possibly related to pillow.  - Self-stretching and massage gun provide temporary relief.  - Previous physical therapy resulted in numbness, discontinued due to other commitments.    Supplemental information: History of wrist fracture with residual pain and limited motion. Underwent physical therapy for  wrist.    MEDICATIONS  - Current: Mobic  - Current: Robaxin  - Discontinued: gabapentin    Procedure history:  -3/5/2025 trigger point injections -significant relief x 3-4 days  -3/13/2025 Lumbar Transforaminal Epidural Steroid Injection at the  right and left  L5-S1 levels.  50% improvement in index pain    ROS:   Red Flags ROS:   Fever, Chills, Sweats: Denies  Involuntary Weight Loss: Denies  Bladder Incontinence: Denies  Bowel Incontinence: denies  Saddle Anesthesia: Denies    All other systems reviewed and negative.     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/3/2019    Psychiatric problem     depression, anxiety    Urinary tract infection 5/3/2019       PSHx:   Past Surgical History:   Procedure Laterality Date    BLOCK EPIDURAL STEROID INJECTION Bilateral 3/13/2025    Procedure: RIGHT AND LEFT L5-S1 TRANSFORAMINAL EPIDURAL STEROID INJECTION;  Surgeon: Jenelle Nguyen M.D.;  Location: SURGERY DAY SURGERY [facility];  Service: Pain Management    CATH REMOVAL  9/21/2017    Procedure: CATH REMOVAL- PORT;  Surgeon: oYli Grullon M.D.;  Location: SURGERY [facility];  Service: General    GASTROSCOPY-ENDO  4/28/2017    Procedure: GASTROSCOPY-ENDO;  Surgeon: Everette Jett M.D.;  Location: ENDOSCOPY [facility];  Service:     CATH PLACEMENT Left 4/13/2017    Procedure: PORT PLACEMENT  ;  Surgeon: Yoli Grullon M.D.;  Location: SURGERY [facility];  Service:     BIOPSY GENERAL N/A 4/13/2017    Procedure: BONE MARROW BIOPSY AND ASPIRATION; LUMBAR PUNCTURE  ;  Surgeon: Yoli Grullon M.D.;  Location: SURGERY [facility];  Service:        Family Hx:   History reviewed. No pertinent family history.    Social Hx:  Social History     Socioeconomic History    Marital status: Single     Spouse name: Not on file    Number of children: Not on file    Years of  education: Not on file    Highest education level: Not on file   Occupational History    Not on file   Tobacco Use    Smoking status: Never    Smokeless tobacco: Former    Tobacco comments:     Vapes nicotine   Vaping Use    Vaping status: Every Day    Substances: THC   Substance and Sexual Activity    Alcohol use: Yes     Alcohol/week: 2.4 oz     Types: 2 Glasses of wine, 2 Cans of beer per week    Drug use: Yes     Types: Marijuana     Comment: marijuana    Sexual activity: Not on file   Other Topics Concern    Not on file   Social History Narrative    Not on file     Social Drivers of Health     Financial Resource Strain: Not on file   Food Insecurity: No Food Insecurity (2/23/2025)    Hunger Vital Sign     Worried About Running Out of Food in the Last Year: Never true     Ran Out of Food in the Last Year: Never true   Transportation Needs: No Transportation Needs (2/23/2025)    PRAPARE - Transportation     Lack of Transportation (Medical): No     Lack of Transportation (Non-Medical): No   Physical Activity: Not on file   Stress: Not on file   Social Connections: Not on file   Intimate Partner Violence: Not At Risk (2/23/2025)    Humiliation, Afraid, Rape, and Kick questionnaire     Fear of Current or Ex-Partner: No     Emotionally Abused: No     Physically Abused: No     Sexually Abused: No   Housing Stability: Low Risk  (2/23/2025)    Housing Stability Vital Sign     Unable to Pay for Housing in the Last Year: No     Number of Times Moved in the Last Year: 0     Homeless in the Last Year: No       Allergies:  Allergies   Allergen Reactions    Bactrim [Sulfamethoxazole W-Trimethoprim] Vomiting and Nausea       Medications: reviewed on epic.   Outpatient Medications Marked as Taking for the 4/11/25 encounter (Office Visit) with Jenelle Nguyen M.D.   Medication Sig Dispense Refill    meloxicam (MOBIC) 15 MG tablet Take 1 Tablet by mouth 1 time a day as needed for Mild Pain or Moderate Pain. 30 Tablet 0     methocarbamol (ROBAXIN) 500 MG Tab Take 1 Tablet by mouth 4 times a day. 120 Tablet 0    acetaminophen (TYLENOL) 500 MG Tab Take 2 Tablets by mouth every 6 hours as needed for Mild Pain or Moderate Pain.      gabapentin (NEURONTIN) 300 MG Cap Take 1 Capsule by mouth 3 times a day. 90 Capsule 0    lidocaine (ASPERFLEX) 4 % Patch Place 1 Patch on the skin every 24 hours. (12 hours on, 12 hours off) 30 Patch 0    vitamin D2, Ergocalciferol, (DRISDOL) 1.25 MG (89661 UT) Cap capsule Take 1 Capsule by mouth every 7 days. 5 Capsule 0    ALPRAZolam (XANAX) 0.25 MG Tab Take 0.25 mg by mouth 1 time a day as needed for Sleep or Anxiety.      amphetamine-dextroamphetamine (ADDERALL) 30 MG tablet Take 30 mg by mouth every day.      lamotrigine (LAMICTAL) 150 MG tablet Take 300 mg by mouth every day. 2 tablets= 300mg          Current Outpatient Medications on File Prior to Visit   Medication Sig Dispense Refill    meloxicam (MOBIC) 15 MG tablet Take 1 Tablet by mouth 1 time a day as needed for Mild Pain or Moderate Pain. 30 Tablet 0    methocarbamol (ROBAXIN) 500 MG Tab Take 1 Tablet by mouth 4 times a day. 120 Tablet 0    acetaminophen (TYLENOL) 500 MG Tab Take 2 Tablets by mouth every 6 hours as needed for Mild Pain or Moderate Pain.      gabapentin (NEURONTIN) 300 MG Cap Take 1 Capsule by mouth 3 times a day. 90 Capsule 0    lidocaine (ASPERFLEX) 4 % Patch Place 1 Patch on the skin every 24 hours. (12 hours on, 12 hours off) 30 Patch 0    vitamin D2, Ergocalciferol, (DRISDOL) 1.25 MG (89488 UT) Cap capsule Take 1 Capsule by mouth every 7 days. 5 Capsule 0    ALPRAZolam (XANAX) 0.25 MG Tab Take 0.25 mg by mouth 1 time a day as needed for Sleep or Anxiety.      amphetamine-dextroamphetamine (ADDERALL) 30 MG tablet Take 30 mg by mouth every day.      lamotrigine (LAMICTAL) 150 MG tablet Take 300 mg by mouth every day. 2 tablets= 300mg       No current facility-administered medications on file prior to visit.         EXAMINATION  "    Physical Exam:   /66 (BP Location: Left arm, Patient Position: Sitting, BP Cuff Size: Adult)   Pulse 79   Temp 36.8 °C (98.2 °F) (Temporal)   Ht 1.6 m (5' 3\")   Wt 61.6 kg (135 lb 12.9 oz)   SpO2 98%     Constitutional:   Body Habitus: Body mass index is 24.06 kg/m².  Cooperation: Fully cooperates with exam  Appearance: Well-groomed, well-nourished.    Eyes: No scleral icterus to suggest severe liver disease, no proptosis to suggest severe hyperthyroidism    ENT -no obvious auditory deficits, no noticeable facial droop     Skin -no rashes or lesions noted     Respiratory-  breathing comfortably on room air, no audible wheezing    Cardiovascular-distal extremities warm and well perfused.  No lower extremity edema is noted.     Gastrointestinal - no obvious abdominal masses, non-distended    Psychiatric- alert and oriented ×3. Normal affect.     Gait stable, steady    Cervical spine   Inspection: No deformities of the skin over the cervical spine. No rashes or lesions.    Spurling's sign  negative bilaterally  Cervical facet loading maneuver  negative bilaterally    Tenderness to palpation at right upper trapezius reproducing the patient's pain     No signs of muscular atrophy in bilateral upper extremities     Key points for the international standards for neurological classification of spinal cord injury (ISNCSCI) to light touch.     Dermatome R L   C4 2 2   C5 2 2   C6 2 2   C7 2 2   C8 2 2   T1 2 2   T2 2 2       Motor Exam Upper Extremities   ? Myotome R L   Shoulder abduction C5 5 5   Elbow flexion C5 5 5   Wrist extension C6 5 5   Elbow extension C7 5 5   Finger flexion C8 5 5   Finger abduction T1 5 5      Thoracic/Lumbar Spine/Sacral Spine/Hips   Inspection: No evidence of atrophy in bilateral lower extremities throughout      Palpation:   Slight tenderness to palpation over the  bilateral glutes, bilateral lumbar paraspinal muscles, bilateral thoracic paraspinal muscles reproducing patient's " "pain.      Key points for the international standards for neurological classification of spinal cord injury (ISNCSCI) to light touch.   Dermatome R L   L2 2 2   L3 2 2   L4 2 2   L5 2 2   S1 2 2   S2 2 2         Motor Exam Lower Extremities  ? Myotome R L   Hip flexion L2 5 5   Knee extension L3 5 5   Ankle dorsiflexion L4 5 5   Toe extension L5 5 5   Ankle plantarflexion S1 5 5           MEDICAL DECISION MAKING    Medical records review: see under HPI section.     DATA    Labs: No new labs available for review since last visit.   Lab Results   Component Value Date/Time    SODIUM 138 03/04/2025 04:29 AM    POTASSIUM 4.2 03/04/2025 04:29 AM    CHLORIDE 104 03/04/2025 04:29 AM    CO2 22 03/04/2025 04:29 AM    ANION 12.0 02/24/2025 01:41 AM    GLUCOSE 108 (H) 03/04/2025 04:29 AM    BUN 8 03/04/2025 04:29 AM    CREATININE 0.58 03/04/2025 04:29 AM    CALCIUM 9.2 03/04/2025 04:29 AM    ASTSGOT 18 11/08/2024 02:46 PM    ALTSGPT 25 11/08/2024 02:46 PM    TBILIRUBIN 0.3 11/08/2024 02:46 PM    ALBUMIN 4.3 03/04/2025 04:29 AM    TOTPROTEIN 7.9 11/08/2024 02:46 PM    GLOBULIN 3.3 11/08/2024 02:46 PM    AGRATIO 1.4 11/08/2024 02:46 PM       Lab Results   Component Value Date/Time    PROTHROMBTM 13.0 04/28/2017 01:19 AM    INR 0.95 04/28/2017 01:19 AM        Lab Results   Component Value Date/Time    WBC 7.5 03/04/2025 02:55 AM    RBC 4.59 03/04/2025 02:55 AM    HEMOGLOBIN 13.7 03/04/2025 02:55 AM    HEMATOCRIT 43.2 03/04/2025 02:55 AM    MCV 94.1 03/04/2025 02:55 AM    MCH 29.8 03/04/2025 02:55 AM    MCHC 31.7 (L) 03/04/2025 02:55 AM    MPV 9.6 03/04/2025 02:55 AM    NEUTSPOLYS 65.00 02/23/2025 02:59 PM    LYMPHOCYTES 29.20 02/23/2025 02:59 PM    MONOCYTES 4.90 02/23/2025 02:59 PM    EOSINOPHILS 0.50 02/23/2025 02:59 PM    BASOPHILS 0.20 02/23/2025 02:59 PM    HYPOCHROMIA 1+ 06/06/2017 04:40 AM    ANISOCYTOSIS 2+ 06/29/2017 04:00 AM        No results found for: \"HBA1C\"     EMG 12/4/24 with Dr. Chambers  Electrophysiologic " Impression:This is a normal study of the bilateral lower extremities. There is no electrophysiologic evidence for large fiber polyneuropathy or lumbosacral radiculopathy.     Imaging:   I personally reviewed following images, these are my reads  MRI lumbar spine 2/23/2025  Disc bulge at L4-5 contributing to mild left lateral recess stenosis and mild bilateral neuroforaminal stenosis.  Disc bulge at L5-S1 with no significant neuroforaminal stenosis. See formal radiology report for further details.    MRI thoracic spine 2/24/2025  No significant central canal stenosis or neuroforaminal stenosis.  See formal radiology report for further details.    MRI cervical spine 2/24/2025  At C5-6 there is mild left neuroforaminal stenosis.  No significant central canal stenosis or neuroforaminal stenosis elsewhere.See formal radiology report for further details.    MRI lumbar spine 8/19/2024  Annular fissure at L5-S1.  Disc bulge at L4-5 and L5-S1 without significant neuroforaminal stenosis or central canal stenosis.See formal radiology report for further details.      MRI pelvis 8/19/2024  No evidence of sacroiliitis.  See formal radiology report for further details.      IMAGING radiology reads. I reviewed the following radiology reads                      Results for orders placed in visit on 08/19/24    MR-LUMBAR SPINE-W/O    Impression  Central disc protrusion at L4-5 and L5-S1 without significant canal stenosis or cauda equina impingement.    Small midline dorsal annular fissure at L5-S1. Annular fissures can represent an independent source of back pain and radicular symptoms by referred pain mechanisms.    Results for orders placed during the hospital encounter of 05/01/19    MR-LUMBAR SPINE-WITH & W/O    Impression  1.  Mild posterior disc bulging again seen at the L4-5 and L5-S1 levels, without evidence for neural compression.  2.  No lumbar spine fracture or segmental malalignment.  3.  No abnormal enhancement within  the lumbar spine or spinal canal.    These findings were discussed with FRAN AKHTAR by Dr. Alvarado on the morning of 5/2/2019.      Results for orders placed in visit on 08/19/24    MR-LUMBAR SPINE-W/O    Impression  Central disc protrusion at L4-5 and L5-S1 without significant canal stenosis or cauda equina impingement.    Small midline dorsal annular fissure at L5-S1. Annular fissures can represent an independent source of back pain and radicular symptoms by referred pain mechanisms.   Results for orders placed during the hospital encounter of 05/01/19    MR-THORACIC SPINE-WITH & W/O    Impression  1.  Multilevel mild disc bulging, without significant neural compression.  2.  No focal marrow edema to indicate fracture.  3.  No focal abnormal marrow signal to indicate tumor.  4.  No abnormal enhancement within the thoracic spine or spinal canal.    These findings were discussed with FRAN AKHTAR by Dr. Alvarado on the morning of 5/2/2019.              Results for orders placed in visit on 08/19/24    MR-PELVIS W/O    Impression  1. No sacroiliitis. No degenerative change or joint effusion.  2. No pulmonary edema or bone marrow replacement.  3. Apparent diffuse wall thickening of the visualized distal colon and rectum.                             Results for orders placed during the hospital encounter of 11/08/18    DX-CHEST-2 VIEWS    Impression  No acute cardiopulmonary disease.                    Results for orders placed during the hospital encounter of 04/06/17    DX-KNEES-AP BILATERAL STANDING    Impression  Slight varus angulation of the right knee joint.    Results for orders placed during the hospital encounter of 12/19/23    DX-LUMBAR SPINE-2 OR 3 VIEWS    Impression  Mild degenerative disc disease at L4-5 and L5-S1.  No malalignment.    Results for orders placed during the hospital encounter of 04/06/17    DX-PELVIS-1 OR 2 VIEWS    Impression  No acute fracture identified. If symptoms are persistent,  MRI would be recommended for further evaluation.                Results for orders placed in visit on 24    DX-WRIST-COMPLETE 3+ LEFT   Results for orders placed in visit on 24    DX-WRIST-LIMITED 2- LEFT        Diagnosis  Visit Diagnoses     ICD-10-CM   1. Lumbar radiculopathy  M54.16   2. Chronic bilateral low back pain without sciatica  M54.50    G89.29   3. Numbness and tingling of both legs  R20.0    R20.2   4. Myalgia  M79.10   5. Other chronic pain  G89.29   6. Lumbar disc herniation  M51.26   7. Sacroiliac joint dysfunction of both sides  M53.3   8. Cervicalgia  M54.2             ASSESSMENT AND PLAN:  Ngoc Morales (: 2000) is a female with      Ngoc was seen today for follow-up.    Diagnoses and all orders for this visit:    Lumbar radiculopathy  -     Referral to Physical Therapy    Chronic bilateral low back pain without sciatica  -     Referral to Physical Therapy    Numbness and tingling of both legs  -     Referral to Physical Therapy    Myalgia  -     Referral to Physical Therapy  -     Referral to Pain Clinic    Other chronic pain  -     Referral to Physical Therapy    Lumbar disc herniation  -     Referral to Physical Therapy    Sacroiliac joint dysfunction of both sides  -     Referral to Physical Therapy    Cervicalgia  -     Referral to Physical Therapy        The patient's back pain is likely due to a combination of factors, including bulging discs at L4-5 and L5-S1, and potential muscle strain.  I have discussed that the numbness and tingling in her legs could be a side effect of vincristine, a drug used in her chemotherapy.     Physical therapy:  -Referral to physical therapy today    Imaging:  Personally reviewed at today's visit:   Fluoroscopic images from 3/13/2025 indicating successful right and left L5-S1 transforaminal epidural steroid injection under fluoroscopic guidance    Medications:  - of note taking tramadol 2 tabs Q4H for pain relief. Patient notes she  was taking this before this recent flare up as prescribed by Dr. Alvarenga  - agree with discontinuation of gabapentin 100mg TID given SE of mood issues  - continue meloxicam PRN   - continue methocarbamol QHS PRN     Injections:  - Trigger point injections under ultrasound guidance at next visit to address myalgia pain including right upper trapezius  -Bilateral L5-S1 transforaminal epidural steroid injection under fluoroscopic guidance as needed    Follow-up: Next available for trigger point injection    Orders Placed This Encounter    Referral to Physical Therapy    Referral to Pain Clinic       Jenelle Nguyen MD  Interventional Pain and Spine  Physical Medicine and Rehabilitation  [facility]    The above note documents my personal evaluation of this patient. In addition, I have reviewed and confirmed with the patient and MA the supportive information documented in today's Patient Health Questionnaire and Office Note.     Please note that this dictation was created using voice recognition software. I have made every reasonable attempt to correct obvious errors, but I expect that there are errors of grammar and possibly content that I did not discover before finalizing the note.

## 2025-04-15 ENCOUNTER — OFFICE VISIT (OUTPATIENT)
Dept: PHYSICAL MEDICINE AND REHAB | Facility: MEDICAL CENTER | Age: 25
End: 2025-04-15
Payer: COMMERCIAL

## 2025-04-15 VITALS
HEART RATE: 90 BPM | HEIGHT: 63 IN | OXYGEN SATURATION: 100 % | WEIGHT: 136.91 LBS | TEMPERATURE: 97.3 F | SYSTOLIC BLOOD PRESSURE: 110 MMHG | BODY MASS INDEX: 24.26 KG/M2 | DIASTOLIC BLOOD PRESSURE: 66 MMHG

## 2025-04-15 DIAGNOSIS — M79.10 MYALGIA: ICD-10-CM

## 2025-04-15 PROCEDURE — 76942 ECHO GUIDE FOR BIOPSY: CPT | Performed by: STUDENT IN AN ORGANIZED HEALTH CARE EDUCATION/TRAINING PROGRAM

## 2025-04-15 PROCEDURE — 3078F DIAST BP <80 MM HG: CPT | Performed by: STUDENT IN AN ORGANIZED HEALTH CARE EDUCATION/TRAINING PROGRAM

## 2025-04-15 PROCEDURE — 3074F SYST BP LT 130 MM HG: CPT | Performed by: STUDENT IN AN ORGANIZED HEALTH CARE EDUCATION/TRAINING PROGRAM

## 2025-04-15 PROCEDURE — 20553 NJX 1/MLT TRIGGER POINTS 3/>: CPT | Performed by: STUDENT IN AN ORGANIZED HEALTH CARE EDUCATION/TRAINING PROGRAM

## 2025-04-15 RX ORDER — BUPIVACAINE HYDROCHLORIDE 5 MG/ML
5 INJECTION, SOLUTION EPIDURAL; INTRACAUDAL; PERINEURAL ONCE
Status: COMPLETED | OUTPATIENT
Start: 2025-04-15 | End: 2025-04-15

## 2025-04-15 RX ADMIN — Medication 5 ML: at 17:15

## 2025-04-15 RX ADMIN — BUPIVACAINE HYDROCHLORIDE 5 ML: 5 INJECTION, SOLUTION EPIDURAL; INTRACAUDAL; PERINEURAL at 17:15

## 2025-04-15 ASSESSMENT — FIBROSIS 4 INDEX: FIB4 SCORE: 0.27

## 2025-04-15 ASSESSMENT — PAIN SCALES - GENERAL: PAINLEVEL_OUTOF10: 8=MODERATE-SEVERE PAIN

## 2025-04-15 NOTE — Clinical Note
REFERRAL APPROVAL NOTICE         Sent on April 15, 2025                   Ngoc Morales  [address]  [place] [address]                   Dear Ms. Morales,    After a careful review of the medical information and benefit coverage, [facility] has processed your referral. See below for additional details.    If applicable, you must be actively enrolled with your insurance for coverage of the authorized service. If you have any questions regarding your coverage, please contact your insurance directly.    REFERRAL INFORMATION   Referral #:  [id number]  Referred-To Provider    Referred-By Provider:  Physical Therapy    Jenelle Nguyen M.D.   [facility]      [address]  [address]  [facility] [facility] [id number] [phone] [address] [facility] [address]  [phone]    Referral Start Date:  04/11/2025  Referral End Date:   04/11/2026             SCHEDULING  If you do not already have an appointment, please call [phone] to make an appointment.     MORE INFORMATION  If you do not already have a [email or web address] account, sign up at: [email or web address]  You can access your medical information, make appointments, see lab results, billing information, and more.  If you have questions regarding this referral, please contact  the [facility] Referrals department at:             [phone]. Monday - Friday 8:00AM - 5:00PM.     Sincerely,    [facility]

## 2025-04-16 NOTE — PROCEDURES
Patient Name: Ngoc Morales  : 2000  Date of Service: 2025    Physician/s: Jenelle Nguyen MD    Pre-operative Diagnosis: Myalgia (M79.1)    Post-operative Diagnosis: Myalgia (M79.1)    Procedure: trigger point injections of the following muscles:      Site R L   Splenius capitis     Semispinalis capitis     Splenius cervicis     Sternocleidomastoid     Upper trapezius x x   Levator scapulae x x   Rhomboids x x   Pectoralis minor     Pectoralis major     Serratus anterior     Supraspinatus     Infraspinatus     Teres minor     Teres major     Quadratus lumborum     Latissimus dorsi          Paravertebral, cervical     Paravertebral, thoracic     Paravertebral, lumbar x x   Gluteus gabrielle x x   Gluteus medius     Gluteus minimus     Tensor fascia charis     Vastus lateralis     Adductor david     Adductor longus     Occipitalis     Cervical paraspinal     Trapezius, mid     Trapezius, lower       Description of procedure:    The risks, benefits, and alternatives of the procedure were reviewed and discussed with the patient.  Written informed consent was freely obtained. A pre-procedural time-out was conducted by the physician verifying patient’s identity, procedure to be performed, procedure site and side, and allergy verification. Appropriate equipment was determined to be in place for the procedure.     In the office suite exam room the patient was placed in a prone position and the skin areas for injection over the above muscles were marked. The areas of pain were then prepped and draped in the usual sterile fashion. A solution was prepared with 5 mL of 1% lidocaine and 5 mL of 0.5% bupivacaine. Ultrasound was confirmed to view the adjacent structures for blood vessels and nerves and to confirm the needle path was not within the structures. A 27g needle was placed into each of the markings at the areas above under ultrasound guidance with an out of plane approach. After negative aspiration,  approximately 0.8-1.5 mL of the above solution was injected. The needle was removed intact after each trigger point injection, and the patient's back was covered with a 4x4 gauze, the area was cleansed with sterile normal saline, and a dressing was applied. There were no complications noted. The images were uploaded to our media tab for permanent storage.    Jenelle Nguyen MD  Interventional Pain and Spine  Physical Medicine and Rehabilitation  [facility]

## 2025-06-10 ENCOUNTER — OFFICE VISIT (OUTPATIENT)
Dept: URGENT CARE | Facility: CLINIC | Age: 25
End: 2025-06-10
Payer: COMMERCIAL

## 2025-06-10 ENCOUNTER — HOSPITAL ENCOUNTER (OUTPATIENT)
Facility: MEDICAL CENTER | Age: 25
End: 2025-06-10
Attending: STUDENT IN AN ORGANIZED HEALTH CARE EDUCATION/TRAINING PROGRAM
Payer: COMMERCIAL

## 2025-06-10 ENCOUNTER — APPOINTMENT (OUTPATIENT)
Dept: RADIOLOGY | Facility: IMAGING CENTER | Age: 25
End: 2025-06-10
Attending: STUDENT IN AN ORGANIZED HEALTH CARE EDUCATION/TRAINING PROGRAM
Payer: COMMERCIAL

## 2025-06-10 VITALS
SYSTOLIC BLOOD PRESSURE: 118 MMHG | RESPIRATION RATE: 18 BRPM | HEART RATE: 126 BPM | HEIGHT: 63 IN | OXYGEN SATURATION: 95 % | TEMPERATURE: 98.8 F | WEIGHT: 130 LBS | BODY MASS INDEX: 23.04 KG/M2 | DIASTOLIC BLOOD PRESSURE: 76 MMHG

## 2025-06-10 DIAGNOSIS — Z71.1 CONCERN ABOUT STD IN FEMALE WITHOUT DIAGNOSIS: ICD-10-CM

## 2025-06-10 DIAGNOSIS — J40 BRONCHITIS: ICD-10-CM

## 2025-06-10 DIAGNOSIS — R39.9 LOWER URINARY TRACT SYMPTOMS (LUTS): ICD-10-CM

## 2025-06-10 DIAGNOSIS — T36.95XA ANTIBIOTIC-INDUCED YEAST INFECTION: ICD-10-CM

## 2025-06-10 DIAGNOSIS — B37.9 ANTIBIOTIC-INDUCED YEAST INFECTION: ICD-10-CM

## 2025-06-10 DIAGNOSIS — J02.0 PHARYNGITIS DUE TO GROUP A BETA HEMOLYTIC STREPTOCOCCI: ICD-10-CM

## 2025-06-10 LAB
APPEARANCE UR: CLEAR
BILIRUB UR STRIP-MCNC: NEGATIVE MG/DL
COLOR UR AUTO: YELLOW
GLUCOSE UR STRIP.AUTO-MCNC: NEGATIVE MG/DL
HETEROPH AB SER QL LA: NEGATIVE
KETONES UR STRIP.AUTO-MCNC: NEGATIVE MG/DL
LEUKOCYTE ESTERASE UR QL STRIP.AUTO: NEGATIVE
NITRITE UR QL STRIP.AUTO: NEGATIVE
PH UR STRIP.AUTO: 7 [PH] (ref 5–8)
POCT INT CON NEG: NEGATIVE
POCT INT CON NEG: NEGATIVE
POCT INT CON POS: POSITIVE
POCT INT CON POS: POSITIVE
POCT URINE PREGNANCY TEST: NEGATIVE
PROT UR QL STRIP: NEGATIVE MG/DL
RBC UR QL AUTO: NORMAL
S PYO DNA SPEC NAA+PROBE: DETECTED
SP GR UR STRIP.AUTO: 1.02
UROBILINOGEN UR STRIP-MCNC: 0.2 MG/DL

## 2025-06-10 PROCEDURE — 3074F SYST BP LT 130 MM HG: CPT | Performed by: STUDENT IN AN ORGANIZED HEALTH CARE EDUCATION/TRAINING PROGRAM

## 2025-06-10 PROCEDURE — 86308 HETEROPHILE ANTIBODY SCREEN: CPT | Performed by: STUDENT IN AN ORGANIZED HEALTH CARE EDUCATION/TRAINING PROGRAM

## 2025-06-10 PROCEDURE — 87491 CHLMYD TRACH DNA AMP PROBE: CPT

## 2025-06-10 PROCEDURE — 81025 URINE PREGNANCY TEST: CPT | Performed by: STUDENT IN AN ORGANIZED HEALTH CARE EDUCATION/TRAINING PROGRAM

## 2025-06-10 PROCEDURE — 71046 X-RAY EXAM CHEST 2 VIEWS: CPT | Mod: TC | Performed by: RADIOLOGY

## 2025-06-10 PROCEDURE — 99215 OFFICE O/P EST HI 40 MIN: CPT | Mod: 25 | Performed by: STUDENT IN AN ORGANIZED HEALTH CARE EDUCATION/TRAINING PROGRAM

## 2025-06-10 PROCEDURE — 81002 URINALYSIS NONAUTO W/O SCOPE: CPT | Performed by: STUDENT IN AN ORGANIZED HEALTH CARE EDUCATION/TRAINING PROGRAM

## 2025-06-10 PROCEDURE — 87651 STREP A DNA AMP PROBE: CPT | Performed by: STUDENT IN AN ORGANIZED HEALTH CARE EDUCATION/TRAINING PROGRAM

## 2025-06-10 PROCEDURE — 87510 GARDNER VAG DNA DIR PROBE: CPT

## 2025-06-10 PROCEDURE — 3078F DIAST BP <80 MM HG: CPT | Performed by: STUDENT IN AN ORGANIZED HEALTH CARE EDUCATION/TRAINING PROGRAM

## 2025-06-10 PROCEDURE — 87480 CANDIDA DNA DIR PROBE: CPT

## 2025-06-10 PROCEDURE — 94640 AIRWAY INHALATION TREATMENT: CPT | Performed by: STUDENT IN AN ORGANIZED HEALTH CARE EDUCATION/TRAINING PROGRAM

## 2025-06-10 PROCEDURE — 87591 N.GONORRHOEAE DNA AMP PROB: CPT

## 2025-06-10 PROCEDURE — 87660 TRICHOMONAS VAGIN DIR PROBE: CPT

## 2025-06-10 PROCEDURE — 87086 URINE CULTURE/COLONY COUNT: CPT

## 2025-06-10 RX ORDER — AZITHROMYCIN 250 MG/1
TABLET, FILM COATED ORAL
Qty: 8 TABLET | Refills: 0 | Status: SHIPPED | OUTPATIENT
Start: 2025-06-10

## 2025-06-10 RX ORDER — IPRATROPIUM BROMIDE AND ALBUTEROL SULFATE 2.5; .5 MG/3ML; MG/3ML
3 SOLUTION RESPIRATORY (INHALATION) ONCE
Status: COMPLETED | OUTPATIENT
Start: 2025-06-10 | End: 2025-06-10

## 2025-06-10 RX ORDER — IPRATROPIUM BROMIDE AND ALBUTEROL SULFATE 2.5; .5 MG/3ML; MG/3ML
3 SOLUTION RESPIRATORY (INHALATION) ONCE
Status: DISCONTINUED | OUTPATIENT
Start: 2025-06-10 | End: 2025-06-10

## 2025-06-10 RX ORDER — FLUCONAZOLE 150 MG/1
TABLET ORAL
Qty: 2 TABLET | Refills: 0 | Status: SHIPPED | OUTPATIENT
Start: 2025-06-10

## 2025-06-10 RX ORDER — ALBUTEROL SULFATE 90 UG/1
1-2 INHALANT RESPIRATORY (INHALATION) EVERY 6 HOURS PRN
Qty: 8.5 G | Refills: 0 | Status: SHIPPED | OUTPATIENT
Start: 2025-06-10

## 2025-06-10 RX ORDER — CETIRIZINE HYDROCHLORIDE 10 MG/1
10 TABLET ORAL DAILY
Qty: 30 TABLET | Refills: 1 | Status: SHIPPED | OUTPATIENT
Start: 2025-06-10

## 2025-06-10 RX ADMIN — IPRATROPIUM BROMIDE AND ALBUTEROL SULFATE 3 ML: 2.5; .5 SOLUTION RESPIRATORY (INHALATION) at 15:48

## 2025-06-10 ASSESSMENT — FIBROSIS 4 INDEX: FIB4 SCORE: 0.27

## 2025-06-10 NOTE — PROGRESS NOTES
Subjective:   CHIEF COMPLAINT  Chief Complaint   Patient presents with    Cough     X 2 weeks     UTI       HPI    History of Present Illness  Ngoc Morales is a 24 y.o. female who presents for evaluation of urinary symptoms and cough.    3 weeks ago patient was in [place] and had unprotected sex which was followed by onset of dysuria, urinary frequency and severe pruritus with discharge.  She is inquiring about STI testing.  Reports a history of chronic UTIs but was unable to follow-up with urologist for evaluation.  Overall over the last 2 to 3 days her symptoms have improved and is no longer experiencing urgency or dysuria however is experiencing some vaginal itching with continued discharge.  She has no history of penicillin allergies.  Does get yeast infections with use of antibiotics.    Patient also presents with c/o of a cough which she has been experiencing for approximately 2 weeks.  Also experiencing a mild sore throat.  States she gets uncontrollable coughing fits.  Reports her cough is productive which keeps her up at night and most notable for thing in the morning with green sputum.  Positive ROS for fatigue, wheezing and shortness of breath.  Denies any previous history of asthma or use of inhalers.  Smokes cigarettes.  States her boyfriend/ tested positive for strep approximately 2 days ago and he is currently on antibiotics.  Patient reports they have an infant in home and she is concerned with possibly spreading the illness to her infant therefore is inquiring about testing.    Reports a history of chronic tonsillar hypertrophy        SOCIAL HISTORY  She vapes.    FAMILY HISTORY  Her mother and father are allergic to SULFA.        REVIEW OF SYSTEMS  General: no fever or chills  GI: no nausea or vomiting  See HPI for further details.    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    ALLERGIES  Allergies[1]    CURRENT MEDICATIONS  Home Medications       Reviewed by Christoph Taylor Ass't (Medical Assistant) on 06/10/25 at 1515  Med List Status: <None>     Medication Last Dose Status   acetaminophen (TYLENOL) 500 MG Tab PRN Active   ALPRAZolam (XANAX) 0.25 MG Tab Taking Active   amphetamine-dextroamphetamine (ADDERALL) 30 MG tablet Taking Active   gabapentin (NEURONTIN) 300 MG Cap Taking Active   lamotrigine (LAMICTAL) 150 MG tablet Taking Active   lidocaine (ASPERFLEX) 4 % Patch Not Taking Active   meloxicam (MOBIC) 15 MG tablet Taking Active   methocarbamol (ROBAXIN) 500 MG Tab Taking Active   vitamin D2, Ergocalciferol, (DRISDOL) 1.25 MG (51846 UT) Cap capsule Taking Active                    SOCIAL HISTORY  Social History     Tobacco Use    Smoking status: Never    Smokeless tobacco: Former    Tobacco comments:     Vapes nicotine   Vaping Use    Vaping status: Every Day    Substances: THC   Substance and Sexual Activity    Alcohol use: Yes     Alcohol/week: 2.4 oz     Types: 2 Glasses of wine, 2 Cans of beer per week    Drug use: Yes     Types: Marijuana     Comment: marijuana    Sexual activity: Not on file       FAMILY HISTORY  No family history on file.       Objective:   PHYSICAL EXAM  VITAL SIGNS: /76   Pulse (!) 126   Temp 37.1 °C (98.8 °F) (Temporal)   Resp 18   Ht 1.6 m (5' 3\")   Wt 59 kg (130 lb)   SpO2 95%   BMI 23.03 kg/m²     Gen: no acute distress, normal voice  Skin: dry, " intact, moist mucosal membranes  Eyes: No conjunctival injection b/l  Neck: Normal range of motion. No meningeal signs.   ENT: 3+ tonsils bilaterally with mild erythema.  No exudates.  Uvula midline without any edema of the soft palate.  No lymphadenopathy.  Lungs: No increased work of breathing.  CTAB w/ symmetric expansion  CV: Sinus tachycardia w/o murmurs or clicks  Psych: normal affect, normal judgement, alert, awake      RADIOLOGY RESULTS   DX-CHEST-2 VIEWS  Result Date: 6/10/2025  6/10/2025 4:06 PM HISTORY/REASON FOR EXAM:  Cough TECHNIQUE/EXAM DESCRIPTION: PA and lateral views of the chest. COMPARISON:  Chest x-ray 11/8/2018 FINDINGS: The lungs are clear. The cardiac silhouette is normal in size. No effusions or pneumothoraces are present. There are no significant osseous abnormalities. The visualized portions of the upper abdomen are within normal limits.     NEGATIVE TWO VIEWS OF THE CHEST.             Assessment/Plan:     1. Pharyngitis due to group A beta hemolytic Streptococci  POCT Mononucleosis (mono)    POCT CEPHEID GROUP A STREP - PCR    azithromycin (ZITHROMAX) 250 MG Tab      2. Bronchitis  albuterol 108 (90 Base) MCG/ACT Aero Soln inhalation aerosol    cetirizine (ZYRTEC) 10 MG Tab    ipratropium-albuterol (DUONEB) nebulizer solution    DX-CHEST-2 VIEWS    DISCONTINUED: ipratropium-albuterol (DUONEB) nebulizer solution      3. Concern about STD in female without diagnosis  cefTRIAXone (Rocephin) 500 mg in lidocaine (Xylocaine) 1 % 2 mL for IM use    azithromycin (ZITHROMAX) 250 MG Tab      4. Lower urinary tract symptoms (LUTS)  POCT PREGNANCY    Chlamydia/GC, PCR (Genital/Anal swab)    VAGINAL PATHOGENS DNA PANEL    URINE CULTURE(NEW)    POCT Urinalysis    cefTRIAXone (Rocephin) 500 mg in lidocaine (Xylocaine) 1 % 2 mL for IM use      5. Antibiotic-induced yeast infection  fluconazole (DIFLUCAN) 150 MG tablet      1) patient tested positive for group A strep.  Known exposure.  She is also being  seen today for STI rule out and beginning empiric treatment, therefore will treat  with azithromycin, to also cover for chlamydia  -Ordered  azithromycin 1 g p.o. today followed by 250 mg p.o. daily for the next 4 days.    2) chest x-ray unremarkable.  Patient was given a breathing treatment with improved breathing however did demonstrate continued symmetric wheezing throughout on follow-up exam.  Patient is also current smoker which is likely contributing to the lower airway reactivity.  -Ordered albuterol.  Spacer was provided.  Proper technique was reviewed  -Ordered Zyrtec    3-5) recent unprotected sex when visiting [place] so we will begin empiric treatment for gonorrhea and chlamydia.  -Rocephin 500 mg IM x 1 given in clinic  -Ordered azithromycin as described above to also cover for group A strep  -Ordered vaginal pathogens  -Ordered urine culture  -Ordered GC/CT  -Ordered Diflucan if develop yeast infection from above medications  -Return to urgent care any new/worsening symptoms or further questions or concerns.  Patient understood everything discussed.  All questions were answered.    43 minutes was spent on this encounter including review of previous medical records, face-to-face time, discussing the diagnosis, medical management, follow-up, return precautions and charting. This does not include time spent on separately billable procedures/tests.      Please note that this dictation was created using voice recognition software. I have made a reasonable attempt to correct obvious errors, but I expect that there are errors of grammar and possibly content that I did not discover before finalizing the note.                [1]   Allergies  Allergen Reactions    Bactrim [Sulfamethoxazole W-Trimethoprim] Vomiting and Nausea

## 2025-06-11 ENCOUNTER — RESULTS FOLLOW-UP (OUTPATIENT)
Dept: URGENT CARE | Facility: PHYSICIAN GROUP | Age: 25
End: 2025-06-11

## 2025-06-11 LAB
C TRACH DNA GENITAL QL NAA+PROBE: NEGATIVE
CANDIDA DNA VAG QL PROBE+SIG AMP: NEGATIVE
G VAGINALIS DNA VAG QL PROBE+SIG AMP: NEGATIVE
N GONORRHOEA DNA GENITAL QL NAA+PROBE: NEGATIVE
SPECIMEN SOURCE: NORMAL
T VAGINALIS DNA VAG QL PROBE+SIG AMP: NEGATIVE

## 2025-06-13 LAB
BACTERIA UR CULT: NORMAL
SIGNIFICANT IND 70042: NORMAL
SITE SITE: NORMAL
SOURCE SOURCE: NORMAL

## 2025-06-17 ENCOUNTER — OFFICE VISIT (OUTPATIENT)
Dept: URGENT CARE | Facility: CLINIC | Age: 25
End: 2025-06-17
Payer: COMMERCIAL

## 2025-06-17 ENCOUNTER — APPOINTMENT (OUTPATIENT)
Dept: RADIOLOGY | Facility: IMAGING CENTER | Age: 25
End: 2025-06-17
Attending: NURSE PRACTITIONER
Payer: COMMERCIAL

## 2025-06-17 VITALS
WEIGHT: 129 LBS | SYSTOLIC BLOOD PRESSURE: 110 MMHG | RESPIRATION RATE: 16 BRPM | HEART RATE: 100 BPM | BODY MASS INDEX: 22.86 KG/M2 | DIASTOLIC BLOOD PRESSURE: 76 MMHG | OXYGEN SATURATION: 99 % | TEMPERATURE: 97.6 F | HEIGHT: 63 IN

## 2025-06-17 DIAGNOSIS — R05.8 POST-VIRAL COUGH SYNDROME: ICD-10-CM

## 2025-06-17 DIAGNOSIS — Z32.02 NEGATIVE PREGNANCY TEST: Primary | ICD-10-CM

## 2025-06-17 DIAGNOSIS — R07.81 RIB PAIN ON RIGHT SIDE: ICD-10-CM

## 2025-06-17 DIAGNOSIS — R06.02 SOB (SHORTNESS OF BREATH): ICD-10-CM

## 2025-06-17 LAB
POCT INT CON NEG: NEGATIVE
POCT INT CON POS: POSITIVE
POCT URINE PREGNANCY TEST: NEGATIVE

## 2025-06-17 PROCEDURE — 81025 URINE PREGNANCY TEST: CPT | Performed by: NURSE PRACTITIONER

## 2025-06-17 PROCEDURE — 99213 OFFICE O/P EST LOW 20 MIN: CPT | Performed by: NURSE PRACTITIONER

## 2025-06-17 PROCEDURE — 3074F SYST BP LT 130 MM HG: CPT | Performed by: NURSE PRACTITIONER

## 2025-06-17 PROCEDURE — 71046 X-RAY EXAM CHEST 2 VIEWS: CPT | Mod: TC | Performed by: NURSE PRACTITIONER

## 2025-06-17 PROCEDURE — 3078F DIAST BP <80 MM HG: CPT | Performed by: NURSE PRACTITIONER

## 2025-06-17 RX ORDER — DEXTROMETHORPHAN HYDROBROMIDE AND PROMETHAZINE HYDROCHLORIDE 15; 6.25 MG/5ML; MG/5ML
5 SYRUP ORAL EVERY 6 HOURS PRN
Qty: 120 ML | Refills: 0 | Status: SHIPPED | OUTPATIENT
Start: 2025-06-17 | End: 2025-06-24

## 2025-06-17 RX ORDER — BENZONATATE 100 MG/1
100 CAPSULE ORAL 3 TIMES DAILY PRN
Qty: 30 CAPSULE | Refills: 0 | Status: SHIPPED | OUTPATIENT
Start: 2025-06-17

## 2025-06-17 RX ORDER — MELOXICAM 7.5 MG/1
7.5-15 TABLET ORAL DAILY
Qty: 30 TABLET | Refills: 0 | Status: SHIPPED | OUTPATIENT
Start: 2025-06-17

## 2025-06-17 ASSESSMENT — ENCOUNTER SYMPTOMS
VOMITING: 0
DIARRHEA: 0
COUGH: 1
FEVER: 0
WHEEZING: 1
SORE THROAT: 0
HEMOPTYSIS: 0
SHORTNESS OF BREATH: 1
SPUTUM PRODUCTION: 1

## 2025-06-17 ASSESSMENT — FIBROSIS 4 INDEX: FIB4 SCORE: 0.27

## 2025-06-17 NOTE — PROGRESS NOTES
Subjective:     Ngoc Morales is a 24 y.o. female who presents for Follow-Up (From last visit, coughing  , right rib pain)      Presents for right rib pain, starting last night. Lower rib pain increased with respirations. She had a persistent cough.     Also inquiring about pregnancy testing. Stating she's had breast tenderness, and irregular menses. LMP 5/31/2025.     Cough  The current episode started 1 to 4 weeks ago. Associated symptoms include shortness of breath and wheezing. Pertinent negatives include no fever, hemoptysis or sore throat. She has tried a beta-agonist inhaler for the symptoms.       Past Medical History[1]    Past Surgical History[2]    Social History     Socioeconomic History    Marital status: Single     Spouse name: Not on file    Number of children: Not on file    Years of education: Not on file    Highest education level: Not on file   Occupational History    Not on file   Tobacco Use    Smoking status: Never    Smokeless tobacco: Former    Tobacco comments:     Vapes nicotine   Vaping Use    Vaping status: Every Day    Substances: THC   Substance and Sexual Activity    Alcohol use: Yes     Alcohol/week: 2.4 oz     Types: 2 Glasses of wine, 2 Cans of beer per week    Drug use: Yes     Types: Marijuana     Comment: marijuana    Sexual activity: Not on file   Other Topics Concern    Not on file   Social History Narrative    Not on file     Social Drivers of Health     Financial Resource Strain: Not on file   Food Insecurity: No Food Insecurity (2/23/2025)    Hunger Vital Sign     Worried About Running Out of Food in the Last Year: Never true     Ran Out of Food in the Last Year: Never true   Transportation Needs: No Transportation Needs (2/23/2025)    PRAPARE - Transportation     Lack of Transportation (Medical): No     Lack of Transportation (Non-Medical): No   Physical Activity: Not on file   Stress: Not on file   Social Connections: Not on file   Intimate Partner Violence: Not At  "Risk (2/23/2025)    Humiliation, Afraid, Rape, and Kick questionnaire     Fear of Current or Ex-Partner: No     Emotionally Abused: No     Physically Abused: No     Sexually Abused: No   Housing Stability: Low Risk  (2/23/2025)    Housing Stability Vital Sign     Unable to Pay for Housing in the Last Year: No     Number of Times Moved in the Last Year: 0     Homeless in the Last Year: No        History reviewed. No pertinent family history.     Allergies[3]    Review of Systems   Constitutional:  Negative for fever.   HENT:  Negative for sore throat.    Respiratory:  Positive for cough, sputum production, shortness of breath and wheezing. Negative for hemoptysis.    Gastrointestinal:  Negative for diarrhea and vomiting.   All other systems reviewed and are negative.       Objective:   /76   Pulse 100   Temp 36.4 °C (97.6 °F) (Temporal)   Resp 16   Ht 1.6 m (5' 3\")   Wt 58.5 kg (129 lb)   SpO2 99%   Breastfeeding No   BMI 22.85 kg/m²     Physical Exam  Vitals reviewed.   Constitutional:       General: She is not in acute distress.     Appearance: She is well-developed. She is not toxic-appearing.   HENT:      Head: Normocephalic and atraumatic.      Right Ear: External ear normal. Tympanic membrane is not erythematous.      Left Ear: External ear normal. Tympanic membrane is not erythematous.      Mouth/Throat:      Lips: Pink.      Mouth: Mucous membranes are moist.      Pharynx: Oropharynx is clear.      Tonsils: No tonsillar exudate. 2+ on the right. 2+ on the left.   Eyes:      Conjunctiva/sclera: Conjunctivae normal.   Cardiovascular:      Rate and Rhythm: Normal rate.   Pulmonary:      Effort: Pulmonary effort is normal. No tachypnea, bradypnea, accessory muscle usage, prolonged expiration or respiratory distress.      Breath sounds: No stridor. No decreased breath sounds, wheezing, rhonchi or rales.      Comments: Cough noted.   Chest:      Chest wall: Tenderness present.      Comments: Lateral " lower right rib TTP.   Skin:     General: Skin is warm and dry.      Findings: No rash.   Neurological:      Mental Status: She is alert and oriented to person, place, and time.      GCS: GCS eye subscore is 4. GCS verbal subscore is 5. GCS motor subscore is 6.   Psychiatric:         Speech: Speech normal.         Assessment/Plan:   1. Post-viral cough syndrome  - benzonatate (TESSALON) 100 MG Cap; Take 1 Capsule by mouth 3 times a day as needed for Cough.  Dispense: 30 Capsule; Refill: 0  - promethazine-dextromethorphan (PROMETHAZINE-DM) 6.25-15 MG/5ML syrup; Take 5 mL by mouth every 6 hours as needed for Cough for up to 7 days.  Dispense: 120 mL; Refill: 0    2. Rib pain on right side  - DX-CHEST-2 VIEWS; Future  - meloxicam (MOBIC) 7.5 MG Tab; Take 1-2 Tablets by mouth every day.  Dispense: 30 Tablet; Refill: 0    3. SOB (shortness of breath)  - DX-CHEST-2 VIEWS; Future    4. Negative pregnancy test  - POCT Pregnancy    Results for orders placed or performed in visit on 06/17/25   POCT Pregnancy    Collection Time: 06/17/25  3:17 PM   Result Value Ref Range    POC Urine Pregnancy Test Negative     Internal Control Positive Positive     Internal Control Negative Negative      DX-CHEST-2 VIEWS  Result Date: 6/17/2025 6/17/2025 3:18 PM HISTORY/REASON FOR EXAM:  Shortness of Breath. TECHNIQUE/EXAM DESCRIPTION AND NUMBER OF VIEWS: Two views of the chest. COMPARISON:  6/10/2025 FINDINGS: The lungs are clear. The cardiac silhouette is normal in size. There is no evidence of pleural effusion or pneumothorax. Imaged osseous structures are grossly unremarkable.     No active disease.    Symptomatic care.  -Oral hydration and rest.   -Cough control: nonpharmacologic options for cough relief such as throat lozenges, hot tea, honey.  -Tylenol or NSAIDs for pain and fever as directed.   -Temperature therapy: Ice or heat.     Seek emergency medical care immediately for: Trouble breathing, persistent pain or pressure in the  chest, confusion, inability to wake or stay awake, bluish lips or face, persistent tachycardia (fast heart rate), prolonged dizziness, persistent high grade fevers, blood in sputum. Follow up for prolonged cough, persistent wheezing, persistent throat pain, difficulty swallowing, persistent fevers, leg swelling, or any other concerns. Follow up with your Primary Care Provider.     -Presents with persistent cough following a viral respiratory infection. Stable Vitals. Reviewed noted from her previous visit. No pneumonia noted on imaging at that time.  She was given Azithromycin and rocephin in her 6/10 visit. No pneumonia noted on exam. Imaging ordered with rib pain and subjective SOB. No active disease noted. She's not on birth control. Negative Wells Score. She has an appointment with Pain Mgmt on the 20 th.     Differential diagnosis, natural history, supportive care, and indications for immediate follow-up discussed.         [1]   Past Medical History:  Diagnosis Date    Dental disorder     front tooth flipper    DLBCL (diffuse large B cell lymphoma) (HCC) 4/14/2017    Osteopenia due to cancer therapy 5/3/2019    Psychiatric problem     depression, anxiety    Urinary tract infection 5/3/2019   [2]   Past Surgical History:  Procedure Laterality Date    BLOCK EPIDURAL STEROID INJECTION Bilateral 3/13/2025    Procedure: RIGHT AND LEFT L5-S1 TRANSFORAMINAL EPIDURAL STEROID INJECTION;  Surgeon: Jenelle Nguyen M.D.;  Location: SURGERY [facility]ERY [facility];  Service: Pain Management    CATH REMOVAL  9/21/2017    Procedure: CATH REMOVAL- PORT;  Surgeon: Yoli Grullon M.D.;  Location: SURGERY [facility];  Service: General    GASTROSCOPY-ENDO  4/28/2017    Procedure: GASTROSCOPY-ENDO;  Surgeon: Everette Jett M.D.;  Location: ENDOSCOPY [facility];  Service:     CATH PLACEMENT Left 4/13/2017    Procedure: PORT PLACEMENT  ;  Surgeon: Yoli Grullon M.D.;  Location: SURGERY [facility];  Service:      BIOPSY GENERAL N/A 4/13/2017    Procedure: BONE MARROW BIOPSY AND ASPIRATION; LUMBAR PUNCTURE  ;  Surgeon: Yoli Grullon M.D.;  Location: SURGERY [facility];  Service:    [3]   Allergies  Allergen Reactions    Bactrim [Sulfamethoxazole W-Trimethoprim] Vomiting and Nausea

## 2025-06-17 NOTE — PATIENT INSTRUCTIONS
Symptomatic care.  -Oral hydration and rest.   -Cough control: nonpharmacologic options for cough relief such as throat lozenges, hot tea, honey.  -Tylenol or NSAIDs for pain and fever as directed.   -Temperature therapy: Ice or heat.     Seek emergency medical care immediately for: Trouble breathing, persistent pain or pressure in the chest, confusion, inability to wake or stay awake, bluish lips or face, persistent tachycardia (fast heart rate), prolonged dizziness, persistent high grade fevers, blood in sputum. Follow up for prolonged cough, persistent wheezing, persistent throat pain, difficulty swallowing, persistent fevers, leg swelling, or any other concerns. Follow up with your Primary Care Provider.

## 2025-06-19 ENCOUNTER — APPOINTMENT (OUTPATIENT)
Dept: RADIOLOGY | Facility: MEDICAL CENTER | Age: 25
End: 2025-06-19
Attending: EMERGENCY MEDICINE
Payer: COMMERCIAL

## 2025-06-19 ENCOUNTER — HOSPITAL ENCOUNTER (EMERGENCY)
Facility: MEDICAL CENTER | Age: 25
End: 2025-06-19
Attending: EMERGENCY MEDICINE
Payer: COMMERCIAL

## 2025-06-19 VITALS
DIASTOLIC BLOOD PRESSURE: 73 MMHG | OXYGEN SATURATION: 99 % | SYSTOLIC BLOOD PRESSURE: 110 MMHG | WEIGHT: 127.87 LBS | HEART RATE: 76 BPM | BODY MASS INDEX: 22.65 KG/M2 | TEMPERATURE: 97.3 F | RESPIRATION RATE: 20 BRPM

## 2025-06-19 DIAGNOSIS — D72.829 LEUKOCYTOSIS, UNSPECIFIED TYPE: ICD-10-CM

## 2025-06-19 DIAGNOSIS — M79.10 MYALGIA: Primary | ICD-10-CM

## 2025-06-19 DIAGNOSIS — R07.81 RIB PAIN ON RIGHT SIDE: Primary | ICD-10-CM

## 2025-06-19 LAB
ALBUMIN SERPL BCP-MCNC: 3.9 G/DL (ref 3.2–4.9)
ALBUMIN/GLOB SERPL: 1.3 G/DL
ALP SERPL-CCNC: 90 U/L (ref 30–99)
ALT SERPL-CCNC: 10 U/L (ref 2–50)
ANION GAP SERPL CALC-SCNC: 10 MMOL/L (ref 7–16)
AST SERPL-CCNC: 15 U/L (ref 12–45)
BASOPHILS # BLD AUTO: 0.3 % (ref 0–1.8)
BASOPHILS # BLD: 0.03 K/UL (ref 0–0.12)
BILIRUB SERPL-MCNC: 0.3 MG/DL (ref 0.1–1.5)
BUN SERPL-MCNC: 12 MG/DL (ref 8–22)
CALCIUM ALBUM COR SERPL-MCNC: 8.7 MG/DL (ref 8.5–10.5)
CALCIUM SERPL-MCNC: 8.6 MG/DL (ref 8.5–10.5)
CHLORIDE SERPL-SCNC: 106 MMOL/L (ref 96–112)
CO2 SERPL-SCNC: 20 MMOL/L (ref 20–33)
CREAT SERPL-MCNC: 0.61 MG/DL (ref 0.5–1.4)
EOSINOPHIL # BLD AUTO: 0.07 K/UL (ref 0–0.51)
EOSINOPHIL NFR BLD: 0.6 % (ref 0–6.9)
ERYTHROCYTE [DISTWIDTH] IN BLOOD BY AUTOMATED COUNT: 47 FL (ref 35.9–50)
GFR SERPLBLD CREATININE-BSD FMLA CKD-EPI: 128 ML/MIN/1.73 M 2
GLOBULIN SER CALC-MCNC: 3 G/DL (ref 1.9–3.5)
GLUCOSE SERPL-MCNC: 92 MG/DL (ref 65–99)
HCT VFR BLD AUTO: 37.2 % (ref 37–47)
HGB BLD-MCNC: 12.1 G/DL (ref 12–16)
IMM GRANULOCYTES # BLD AUTO: 0.04 K/UL (ref 0–0.11)
IMM GRANULOCYTES NFR BLD AUTO: 0.4 % (ref 0–0.9)
LYMPHOCYTES # BLD AUTO: 3.69 K/UL (ref 1–4.8)
LYMPHOCYTES NFR BLD: 32.5 % (ref 22–41)
MCH RBC QN AUTO: 30.4 PG (ref 27–33)
MCHC RBC AUTO-ENTMCNC: 32.5 G/DL (ref 32.2–35.5)
MCV RBC AUTO: 93.5 FL (ref 81.4–97.8)
MONOCYTES # BLD AUTO: 0.66 K/UL (ref 0–0.85)
MONOCYTES NFR BLD AUTO: 5.8 % (ref 0–13.4)
NEUTROPHILS # BLD AUTO: 6.87 K/UL (ref 1.82–7.42)
NEUTROPHILS NFR BLD: 60.4 % (ref 44–72)
NRBC # BLD AUTO: 0 K/UL
NRBC BLD-RTO: 0 /100 WBC (ref 0–0.2)
PLATELET # BLD AUTO: 372 K/UL (ref 164–446)
PMV BLD AUTO: 9.9 FL (ref 9–12.9)
POTASSIUM SERPL-SCNC: 4 MMOL/L (ref 3.6–5.5)
PROT SERPL-MCNC: 6.9 G/DL (ref 6–8.2)
RBC # BLD AUTO: 3.98 M/UL (ref 4.2–5.4)
SODIUM SERPL-SCNC: 136 MMOL/L (ref 135–145)
WBC # BLD AUTO: 11.4 K/UL (ref 4.8–10.8)

## 2025-06-19 PROCEDURE — 36415 COLL VENOUS BLD VENIPUNCTURE: CPT

## 2025-06-19 PROCEDURE — A9270 NON-COVERED ITEM OR SERVICE: HCPCS | Performed by: EMERGENCY MEDICINE

## 2025-06-19 PROCEDURE — 71045 X-RAY EXAM CHEST 1 VIEW: CPT

## 2025-06-19 PROCEDURE — 85025 COMPLETE CBC W/AUTO DIFF WBC: CPT

## 2025-06-19 PROCEDURE — 71275 CT ANGIOGRAPHY CHEST: CPT

## 2025-06-19 PROCEDURE — 700111 HCHG RX REV CODE 636 W/ 250 OVERRIDE (IP): Mod: JZ | Performed by: EMERGENCY MEDICINE

## 2025-06-19 PROCEDURE — 80053 COMPREHEN METABOLIC PANEL: CPT

## 2025-06-19 PROCEDURE — 96374 THER/PROPH/DIAG INJ IV PUSH: CPT

## 2025-06-19 PROCEDURE — 700101 HCHG RX REV CODE 250: Performed by: EMERGENCY MEDICINE

## 2025-06-19 PROCEDURE — 700117 HCHG RX CONTRAST REV CODE 255: Performed by: EMERGENCY MEDICINE

## 2025-06-19 PROCEDURE — 99284 EMERGENCY DEPT VISIT MOD MDM: CPT

## 2025-06-19 PROCEDURE — 700102 HCHG RX REV CODE 250 W/ 637 OVERRIDE(OP): Performed by: EMERGENCY MEDICINE

## 2025-06-19 RX ORDER — KETOROLAC TROMETHAMINE 15 MG/ML
15 INJECTION, SOLUTION INTRAMUSCULAR; INTRAVENOUS ONCE
Status: COMPLETED | OUTPATIENT
Start: 2025-06-19 | End: 2025-06-19

## 2025-06-19 RX ORDER — LIDOCAINE 4 G/G
1 PATCH TOPICAL EVERY 24 HOURS
Status: DISCONTINUED | OUTPATIENT
Start: 2025-06-19 | End: 2025-06-19 | Stop reason: HOSPADM

## 2025-06-19 RX ORDER — ACETAMINOPHEN 325 MG/1
325 TABLET ORAL ONCE
Status: COMPLETED | OUTPATIENT
Start: 2025-06-19 | End: 2025-06-19

## 2025-06-19 RX ORDER — HYDROCODONE BITARTRATE AND ACETAMINOPHEN 5; 325 MG/1; MG/1
1 TABLET ORAL ONCE
Refills: 0 | Status: COMPLETED | OUTPATIENT
Start: 2025-06-19 | End: 2025-06-19

## 2025-06-19 RX ORDER — OXYCODONE AND ACETAMINOPHEN 5; 325 MG/1; MG/1
1 TABLET ORAL ONCE
Refills: 0 | Status: COMPLETED | OUTPATIENT
Start: 2025-06-19 | End: 2025-06-19

## 2025-06-19 RX ORDER — TRAMADOL HYDROCHLORIDE 50 MG/1
50 TABLET ORAL EVERY 6 HOURS PRN
Qty: 10 TABLET | Refills: 0 | Status: SHIPPED | OUTPATIENT
Start: 2025-06-19 | End: 2025-06-22

## 2025-06-19 RX ORDER — TRAMADOL HYDROCHLORIDE 50 MG/1
50 TABLET ORAL ONCE
Status: COMPLETED | OUTPATIENT
Start: 2025-06-19 | End: 2025-06-19

## 2025-06-19 RX ADMIN — TRAMADOL HYDROCHLORIDE 50 MG: 50 TABLET ORAL at 11:18

## 2025-06-19 RX ADMIN — ACETAMINOPHEN 325 MG: 325 TABLET ORAL at 09:07

## 2025-06-19 RX ADMIN — HYDROCODONE BITARTRATE AND ACETAMINOPHEN 1 TABLET: 5; 325 TABLET ORAL at 09:07

## 2025-06-19 RX ADMIN — LIDOCAINE 1 PATCH: 4 PATCH TOPICAL at 09:07

## 2025-06-19 RX ADMIN — OXYCODONE HYDROCHLORIDE AND ACETAMINOPHEN 1 TABLET: 5; 325 TABLET ORAL at 11:51

## 2025-06-19 RX ADMIN — IOHEXOL 50 ML: 350 INJECTION, SOLUTION INTRAVENOUS at 10:45

## 2025-06-19 RX ADMIN — KETOROLAC TROMETHAMINE 15 MG: 15 INJECTION, SOLUTION INTRAMUSCULAR; INTRAVENOUS at 10:52

## 2025-06-19 ASSESSMENT — FIBROSIS 4 INDEX: FIB4 SCORE: 0.27

## 2025-06-19 NOTE — ED PROVIDER NOTES
"ED Provider Note    CHIEF COMPLAINT  Chief Complaint   Patient presents with    Cough       EXTERNAL RECORDS REVIEWED  PDMP prescription narcotic records    HPI/ROS  LIMITATION TO HISTORY   Select: : None    OUTSIDE HISTORIAN(S):  EMS per report as below    Ngoc Morales is a 24 y.o. female with history of lymphoma and osteopenia who presents for right rib pain.    Patient states that she went to urgent care several weeks ago with complaints of a cough.  She was diagnosed with strep and given a Z-Rico which she completed.  She has been coughing and wheezing ever since.  The cough is productive of clear sputum.  There is been no hemoptysis.  2 days ago she coughed hard and experienced acute onset of right sided rib pain.  She went to urgent care and was prescribed meloxicam.  She felt better yesterday but rolled over last night and heard a \"pop\" and \"crackling\" in her right chest and now has severe pain.  She called 911.  She stated the fentanyl that she received did not help her pain.  Patient ran out of tramadol 1 to 2 weeks ago and is scheduled to see her pain management doctor tomorrow.    Patient denies fever, chills, calf pain, leg swelling, history of PE/DVT.    Patient reports osteopenia due to chemotherapy for lymphoma when she was 17 years old.    PAST MEDICAL HISTORY   has a past medical history of Dental disorder, DLBCL (diffuse large B cell lymphoma) (HCC) (4/14/2017), Osteopenia due to cancer therapy (5/3/2019), Psychiatric problem, and Urinary tract infection (5/3/2019).    SURGICAL HISTORY   has a past surgical history that includes cath placement (Left, 4/13/2017); biopsy general (N/A, 4/13/2017); gastroscopy-endo (4/28/2017); cath removal (9/21/2017); and block epidural steroid injection (Bilateral, 3/13/2025).    FAMILY HISTORY  No h/o PE/DVT    SOCIAL HISTORY  Social History     Tobacco Use    Smoking status: Never    Smokeless tobacco: Former    Tobacco comments:     Vapes nicotine   Vaping " Use    Vaping status: Every Day    Substances: THC   Substance and Sexual Activity    Alcohol use: Yes     Alcohol/week: 2.4 oz     Types: 2 Glasses of wine, 2 Cans of beer per week    Drug use: Yes     Types: Marijuana     Comment: marijuana    Sexual activity: Not on file       CURRENT MEDICATIONS  Home Medications    **Home medications have not yet been reviewed for this encounter**       Audit from Redirected Encounters    **Home medications have not yet been reviewed for this encounter**         ALLERGIES  Allergies[1]    PHYSICAL EXAM  VITAL SIGNS: /55   Pulse 70   Temp 36.3 °C (97.3 °F)   Resp 20   Wt 58 kg (127 lb 13.9 oz)   SpO2 100%   BMI 22.65 kg/m²    General:  WDWN female, nontoxic but uncomfortable appearing; A+Ox3; V/S as above; afebrile, not hypoxic  Skin: warm and dry; good color; no rash  HEENT: NCAT; EOMs intact; PERRL; no scleral icterus   Neck: FROM; no stridor  Cardiovascular: Regular heart rate and rhythm.  No murmurs, rubs, or gallops; pulses 2+ bilaterally radially and DP areas  Chest wall: No ecchymosis, rash, or crepitus; reproducible tenderness of the right lateral rib area  Lungs: No respiratory distress or tachypnea; Clear to auscultation with good air movement bilaterally.  No wheezes, rhonchi, or rales.   Abdomen: soft; NTND; no rebound, guarding, or rigidity.  No organomegaly or pulsatile mass; no mottling  Extremities: VALLEJO x 4; no e/o trauma; no pedal edema; neg Cassandra's  Neurologic: CNs III-XII grossly intact; speech clear  Psychiatric: Appropriate affect, normal mood      EKG/LABS  Results for orders placed or performed during the hospital encounter of 06/19/25   CBC WITH DIFFERENTIAL    Collection Time: 06/19/25 11:53 AM   Result Value Ref Range    WBC 11.4 (H) 4.8 - 10.8 K/uL    RBC 3.98 (L) 4.20 - 5.40 M/uL    Hemoglobin 12.1 12.0 - 16.0 g/dL    Hematocrit 37.2 37.0 - 47.0 %    MCV 93.5 81.4 - 97.8 fL    MCH 30.4 27.0 - 33.0 pg    MCHC 32.5 32.2 - 35.5 g/dL    RDW  47.0 35.9 - 50.0 fL    Platelet Count 372 164 - 446 K/uL    MPV 9.9 9.0 - 12.9 fL    Neutrophils-Polys 60.40 44.00 - 72.00 %    Lymphocytes 32.50 22.00 - 41.00 %    Monocytes 5.80 0.00 - 13.40 %    Eosinophils 0.60 0.00 - 6.90 %    Basophils 0.30 0.00 - 1.80 %    Immature Granulocytes 0.40 0.00 - 0.90 %    Nucleated RBC 0.00 0.00 - 0.20 /100 WBC    Neutrophils (Absolute) 6.87 1.82 - 7.42 K/uL    Lymphs (Absolute) 3.69 1.00 - 4.80 K/uL    Monos (Absolute) 0.66 0.00 - 0.85 K/uL    Eos (Absolute) 0.07 0.00 - 0.51 K/uL    Baso (Absolute) 0.03 0.00 - 0.12 K/uL    Immature Granulocytes (abs) 0.04 0.00 - 0.11 K/uL    NRBC (Absolute) 0.00 K/uL   CMP    Collection Time: 06/19/25 11:53 AM   Result Value Ref Range    Sodium 136 135 - 145 mmol/L    Potassium 4.0 3.6 - 5.5 mmol/L    Chloride 106 96 - 112 mmol/L    Co2 20 20 - 33 mmol/L    Anion Gap 10.0 7.0 - 16.0    Glucose 92 65 - 99 mg/dL    Bun 12 8 - 22 mg/dL    Creatinine 0.61 0.50 - 1.40 mg/dL    Calcium 8.6 8.5 - 10.5 mg/dL    Correct Calcium 8.7 8.5 - 10.5 mg/dL    AST(SGOT) 15 12 - 45 U/L    ALT(SGPT) 10 2 - 50 U/L    Alkaline Phosphatase 90 30 - 99 U/L    Total Bilirubin 0.3 0.1 - 1.5 mg/dL    Albumin 3.9 3.2 - 4.9 g/dL    Total Protein 6.9 6.0 - 8.2 g/dL    Globulin 3.0 1.9 - 3.5 g/dL    A-G Ratio 1.3 g/dL   ESTIMATED GFR    Collection Time: 06/19/25 11:53 AM   Result Value Ref Range    GFR (CKD-EPI) 128 >60 mL/min/1.73 m 2       RADIOLOGY/PROCEDURES   I have independently interpreted the diagnostic imaging associated with this visit and am waiting the final reading from the radiologist.   My preliminary interpretation is as follows:   Chest x-ray negative for focal consolidation    Radiologist interpretation:  CT-CTA CHEST PULMONARY ARTERY W/ RECONS   Final Result      No evidence of pulmonary embolus.      DX-CHEST-PORTABLE (1 VIEW)   Final Result      No evidence of acute cardiopulmonary process.          COURSE & MEDICAL DECISION MAKING    ASSESSMENT, COURSE AND  PLAN  Care Narrative: This is a 24-year-old female who arrives via EMS for severe right chest wall pain.  This is reproducible on palpation and deep inspiration.  She has a history of osteopenia and has had a lingering cough following URI 3 weeks ago. She appears uncomfortable but nontoxic.  She is not in respiratory distress.  She is not tachycardic or hypoxic.  I considered pneumonia, pneumothorax, costochondritis, rib fracture, PE, pathologic fracture.  Patient has also run out of her home tramadol.    Chest x-ray showed no obvious fracture, pneumothorax, or pneumonia.  I advised the patient of this.  She appears quite concerned about the degree of pain and wonders if she may have a rib fracture.  I explained that it is reassuring that the underlying lung looks healthy on the chest x-ray.  She mentions her history of lymphoma and that the chemotherapy affected her bones.  She states it is very unusual for her to have pain like this outside of her back pain.  CT a PE study was ordered.  No blood work was obtained prior to this given no history of kidney issues and the patient reported negative hCG 2 days ago.    I advised the patient of the CTA results that were negative for PE, rib fracture, and pneumonia.  She expressed concern that we had not performed any blood tests today.  I explained that I did not feel this would have changed my management.  Had I found the pathology on the imaging, I may have needed to order labs.  She reports it has been quite sometime since she has had labs and requests them today.      12:57 PM  Patient reevaluated.  There is a male now at the bedside.  I advised the patient that her labs are reassuring with some mild leukocytosis but no concern for anemia or electrolyte abnormalities.  Patient is amenable to being discharged and thanked me for doing labs.  She will follow-up with her primary care doctor and pain doctor as scheduled.  We discussed return precautions.  We will provide  an incentive spirometer.      Narcotics Script: In prescribing controlled substances to this patient, I certify that I have obtained and reviewed the medical history of Ngoc Morales. I have also made a good js effort to obtain applicable records from other providers who have treated the patient and records demonstrating the following: Prescriptions for tramadol.     I have conducted a physical exam and documented it. I have reviewed Ms. Morales’s prescription history as maintained by the [place] Prescription Monitoring Program.     I have assessed the patient’s risk for abuse, dependency, and addiction using the validated Opioid Risk Tool available at https://www.mdcalc.com/rxkydw-knqb-tcyd-ort-narcotic-abuse.     Given the above, I believe the benefits of controlled substance therapy outweigh the risks. The reasons for prescribing controlled substances include non-narcotic, oral analgesic alternatives have been inadequate for pain control. Accordingly, I have discussed the risk and benefits, treatment plan, and alternative therapies with the patient.           DISPOSITION AND DISCUSSIONS  I have discussed management of the patient with the following physicians and DUDLEY's: None    Discussion of management with other Q[facility] or appropriate source(s): None     Decision tools and prescription drugs considered including, but not limited to: Tramadol.    FINAL DIAGNOSIS  1. Rib pain on right side    2. Leukocytosis, unspecified type         Electronically signed by: Alecia Stevens M.D., 6/19/2025 8:12 AM           [1]   Allergies  Allergen Reactions    Bactrim [Sulfamethoxazole W-Trimethoprim] Vomiting and Nausea

## 2025-06-19 NOTE — DISCHARGE INSTRUCTIONS
Follow-up with your pain management doctor as scheduled tomorrow.    Do not drink alcohol or drive today as you received fentanyl.    Take tramadol as needed for severe pain that is not controlled by Tylenol and/or ibuprofen    Follow-up with a primary care provider.  We have placed a referral in the system for you today.    Wear the lidocaine patch for the next 24 hours.  After that you may use Salonpas or other lidocaine patches as needed for pain    Take Tylenol 650 mg every 4 hours as needed for pain    Take ibuprofen (also known as Advil or Motrin) 600 mg every 6 hours with food and water as needed for pain    Return to the ER for worsening pain, difficulty breathing, fever, or other concerns    You are being prescribed a narcotic. Narcotics are addictive. Please take the narcotic sparingly and only as needed for pain. Do not drink alcohol, operate heavy machinery, or drive while taking a narcotic as it may impair your judgment and motor skills. If the narcotic contains acetaminophen, you should not take other acetaminophen-containing products, such as Tylenol, while taking this medication as it may affect your liver.  Also, taking a stool softener while taking narcotics is advised as they cause constipation.      Use the incentive spirometer we are providing to you 3 times daily

## 2025-06-19 NOTE — ED TRIAGE NOTES
Pt to ed c/o cough x 3 weeks  Right sided chest wall pain  Strep throat 2 weeks ago    Iv placed pta  100 fentanyl

## 2025-06-20 ENCOUNTER — APPOINTMENT (OUTPATIENT)
Dept: PHYSICAL MEDICINE AND REHAB | Facility: MEDICAL CENTER | Age: 25
End: 2025-06-20
Payer: COMMERCIAL

## 2025-06-25 ENCOUNTER — OFFICE VISIT (OUTPATIENT)
Dept: PHYSICAL MEDICINE AND REHAB | Facility: MEDICAL CENTER | Age: 25
End: 2025-06-25
Attending: STUDENT IN AN ORGANIZED HEALTH CARE EDUCATION/TRAINING PROGRAM
Payer: COMMERCIAL

## 2025-06-25 VITALS
SYSTOLIC BLOOD PRESSURE: 102 MMHG | OXYGEN SATURATION: 98 % | DIASTOLIC BLOOD PRESSURE: 67 MMHG | HEIGHT: 63 IN | WEIGHT: 129.63 LBS | BODY MASS INDEX: 22.97 KG/M2 | TEMPERATURE: 97.5 F | HEART RATE: 90 BPM

## 2025-06-25 DIAGNOSIS — R20.0 NUMBNESS AND TINGLING OF BOTH LEGS: ICD-10-CM

## 2025-06-25 DIAGNOSIS — G89.29 CHRONIC BILATERAL LOW BACK PAIN WITHOUT SCIATICA: ICD-10-CM

## 2025-06-25 DIAGNOSIS — R20.2 NUMBNESS AND TINGLING OF BOTH LEGS: ICD-10-CM

## 2025-06-25 DIAGNOSIS — M54.2 CERVICALGIA: ICD-10-CM

## 2025-06-25 DIAGNOSIS — M79.10 MYALGIA: ICD-10-CM

## 2025-06-25 DIAGNOSIS — G89.29 OTHER CHRONIC PAIN: ICD-10-CM

## 2025-06-25 DIAGNOSIS — M51.26 LUMBAR DISC HERNIATION: ICD-10-CM

## 2025-06-25 DIAGNOSIS — M54.16 LUMBAR RADICULOPATHY: Primary | ICD-10-CM

## 2025-06-25 DIAGNOSIS — M54.50 CHRONIC BILATERAL LOW BACK PAIN WITHOUT SCIATICA: ICD-10-CM

## 2025-06-25 DIAGNOSIS — M53.3 SACROILIAC JOINT DYSFUNCTION OF BOTH SIDES: ICD-10-CM

## 2025-06-25 RX ORDER — BUPIVACAINE HYDROCHLORIDE 5 MG/ML
5 INJECTION, SOLUTION EPIDURAL; INTRACAUDAL; PERINEURAL ONCE
Status: COMPLETED | OUTPATIENT
Start: 2025-06-25 | End: 2025-06-25

## 2025-06-25 RX ADMIN — BUPIVACAINE HYDROCHLORIDE 5 ML: 5 INJECTION, SOLUTION EPIDURAL; INTRACAUDAL; PERINEURAL at 11:49

## 2025-06-25 RX ADMIN — Medication 5 ML: at 11:49

## 2025-06-25 ASSESSMENT — PATIENT HEALTH QUESTIONNAIRE - PHQ9: CLINICAL INTERPRETATION OF PHQ2 SCORE: 0

## 2025-06-25 ASSESSMENT — FIBROSIS 4 INDEX: FIB4 SCORE: 0.31

## 2025-06-25 ASSESSMENT — PAIN SCALES - GENERAL: PAINLEVEL_OUTOF10: 8=MODERATE-SEVERE PAIN

## 2025-06-25 NOTE — H&P (VIEW-ONLY)
Verbal consent was acquired by the patient to use SCHEDit ambient listening note generation during this visit Yes     Follow-up patient Note    Interventional Pain and Spine  Physiatry (Physical Medicine and Rehabilitation)     Patient Name: Ngoc Morales  : 2000  Date of service: 2025    Chief Complaint:   Chief Complaint   Patient presents with    Follow-Up     Back pain       HISTORY  Please see new patient note by Dr. Nguyen for more details.     HPI  Today's visit   Ngoc Morales ( 2000) is a female with The primary encounter diagnosis was Lumbar radiculopathy. Diagnoses of Myalgia, Chronic bilateral low back pain without sciatica, Numbness and tingling of both legs, Other chronic pain, Lumbar disc herniation, Sacroiliac joint dysfunction of both sides, and Cervicalgia were also pertinent to this visit.    History of Present Illness  The patient is a 24-year-old female who presents for follow-up.      The patient presents for evaluation of back and hip pain.    Bilateral low back pain  - Severe back pain, described as dull and sharp  - Radiates to her hips and mid-lateral thigh  - Prolonged sitting, standing, or walking exacerbates the pain  - Less severe pain between her shoulder blades    Also has pain at her bilateral rhomboid area that feels muscular in nature    Leg Weakness and Fatigue  - Mild leg weakness and fatigue, particularly when standing for extended periods    Pain Management  - Manages the pain with methocarbamol at night and meloxicam during the day  - Previous treatments, including an epidural and trigger point injections, provided some relief  - Epidural effects lasted longer  - Has not yet tried physical therapy    Procedure history:  -3/5/2025 trigger point injections -significant relief x 3-4 days  -3/13/2025 Lumbar Transforaminal Epidural Steroid Injection at the  right and left  L5-S1 levels.  50% improvement in index pain  - 25 trigger point  injections     ROS:   Red Flags ROS:   Fever, Chills, Sweats: Denies  Involuntary Weight Loss: Denies  Bladder Incontinence: Denies  Bowel Incontinence: denies  Saddle Anesthesia: Denies    All other systems reviewed and negative.     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;  Service: Pain Management    CATH REMOVAL  9/21/2017    Procedure: CATH REMOVAL- PORT;  Surgeon: Yoli Grullon M.D.;  Location: SURGERY [facility];  Service: General    GASTROSCOPY-ENDO  4/28/2017    Procedure: GASTROSCOPY-ENDO;  Surgeon: Evertete Jett M.D.;  Location: ENDOSCOPY [facility]  Service:     CATH PLACEMENT Left 4/13/2017    Procedure: PORT PLACEMENT  ;  Surgeon: Yoli Grullon M.D.;  Location: SURGERY [facility];  Service:     BIOPSY GENERAL N/A 4/13/2017    Procedure: BONE MARROW BIOPSY AND ASPIRATION; LUMBAR PUNCTURE  ;  Surgeon: Yoli Grullon M.D.;  Location: SURGERY [facility];  Service:        Family Hx:   History reviewed. No pertinent family history.    Social Hx:  Social History     Socioeconomic History    Marital status: Single     Spouse name: Not on file    Number of children: Not on file    Years of education: Not on file    Highest education level: Not on file   Occupational History    Not on file   Tobacco Use    Smoking status: Never    Smokeless tobacco: Former    Tobacco comments:     Vapes nicotine   Vaping Use    Vaping status: Every Day    Substances: THC   Substance and Sexual  Activity    Alcohol use: Yes     Alcohol/week: 2.4 oz     Types: 2 Glasses of wine, 2 Cans of beer per week    Drug use: Yes     Types: Marijuana     Comment: marijuana    Sexual activity: Not on file   Other Topics Concern    Not on file   Social History Narrative    Not on file     Social Drivers of Health     Financial Resource Strain: Not on file   Food Insecurity: No Food Insecurity (2/23/2025)    Hunger Vital Sign     Worried About Running Out of Food in the Last Year: Never true     Ran Out of Food in the Last Year: Never true   Transportation Needs: No Transportation Needs (2/23/2025)    PRAPARE - Transportation     Lack of Transportation (Medical): No     Lack of Transportation (Non-Medical): No   Physical Activity: Not on file   Stress: Not on file   Social Connections: Not on file   Intimate Partner Violence: Not At Risk (2/23/2025)    Humiliation, Afraid, Rape, and Kick questionnaire     Fear of Current or Ex-Partner: No     Emotionally Abused: No     Physically Abused: No     Sexually Abused: No   Housing Stability: Low Risk  (2/23/2025)    Housing Stability Vital Sign     Unable to Pay for Housing in the Last Year: No     Number of Times Moved in the Last Year: 0     Homeless in the Last Year: No       Allergies:  Allergies   Allergen Reactions    Bactrim [Sulfamethoxazole W-Trimethoprim] Vomiting and Nausea       Medications: reviewed on epic.   Outpatient Medications Marked as Taking for the 6/25/25 encounter (Office Visit) with Jenelle Nguyen M.D.   Medication Sig Dispense Refill    benzonatate (TESSALON) 100 MG Cap Take 1 Capsule by mouth 3 times a day as needed for Cough. 30 Capsule 0    meloxicam (MOBIC) 7.5 MG Tab Take 1-2 Tablets by mouth every day. 30 Tablet 0    albuterol 108 (90 Base) MCG/ACT Aero Soln inhalation aerosol Inhale 1-2 Puffs every 6 hours as needed for Shortness of Breath (cough). 8.5 g 0    cetirizine (ZYRTEC) 10 MG Tab Take 1 Tablet by mouth every day. 30 Tablet 1     fluconazole (DIFLUCAN) 150 MG tablet If develop yeast infection, take 1 tablet.  If still symptomatic in 3 days, take second tablet. 2 Tablet 0    azithromycin (ZITHROMAX) 250 MG Tab Take 4 tablets today.  Then take 1 tablet po qd for the next 4 days. 8 Tablet 0    meloxicam (MOBIC) 15 MG tablet Take 1 Tablet by mouth 1 time a day as needed for Mild Pain or Moderate Pain. 30 Tablet 0    methocarbamol (ROBAXIN) 500 MG Tab Take 1 Tablet by mouth 4 times a day. 120 Tablet 0    acetaminophen (TYLENOL) 500 MG Tab Take 2 Tablets by mouth every 6 hours as needed for Mild Pain or Moderate Pain.      gabapentin (NEURONTIN) 300 MG Cap Take 1 Capsule by mouth 3 times a day. 90 Capsule 0    vitamin D2, Ergocalciferol, (DRISDOL) 1.25 MG (73917 UT) Cap capsule Take 1 Capsule by mouth every 7 days. 5 Capsule 0    ALPRAZolam (XANAX) 0.25 MG Tab Take 0.25 mg by mouth 1 time a day as needed for Sleep or Anxiety.      amphetamine-dextroamphetamine (ADDERALL) 30 MG tablet Take 30 mg by mouth every day.      lamotrigine (LAMICTAL) 150 MG tablet Take 300 mg by mouth every day. 2 tablets= 300mg       Current Facility-Administered Medications for the 6/25/25 encounter (Office Visit) with Jenelle Nguyen M.D.   Medication Dose Route Frequency Provider Last Rate Last Admin    lidocaine (Xylocaine) 1 % injection 5 mL  5 mL Injection Once         bupivacaine (pf) (Marcaine/Sensorcaine) 0.5 % injection 5 mL  5 mL Injection Once             Current Outpatient Medications on File Prior to Visit   Medication Sig Dispense Refill    benzonatate (TESSALON) 100 MG Cap Take 1 Capsule by mouth 3 times a day as needed for Cough. 30 Capsule 0    meloxicam (MOBIC) 7.5 MG Tab Take 1-2 Tablets by mouth every day. 30 Tablet 0    albuterol 108 (90 Base) MCG/ACT Aero Soln inhalation aerosol Inhale 1-2 Puffs every 6 hours as needed for Shortness of Breath (cough). 8.5 g 0    cetirizine (ZYRTEC) 10 MG Tab Take 1 Tablet by mouth every day. 30 Tablet 1     "fluconazole (DIFLUCAN) 150 MG tablet If develop yeast infection, take 1 tablet.  If still symptomatic in 3 days, take second tablet. 2 Tablet 0    azithromycin (ZITHROMAX) 250 MG Tab Take 4 tablets today.  Then take 1 tablet po qd for the next 4 days. 8 Tablet 0    meloxicam (MOBIC) 15 MG tablet Take 1 Tablet by mouth 1 time a day as needed for Mild Pain or Moderate Pain. 30 Tablet 0    methocarbamol (ROBAXIN) 500 MG Tab Take 1 Tablet by mouth 4 times a day. 120 Tablet 0    acetaminophen (TYLENOL) 500 MG Tab Take 2 Tablets by mouth every 6 hours as needed for Mild Pain or Moderate Pain.      gabapentin (NEURONTIN) 300 MG Cap Take 1 Capsule by mouth 3 times a day. 90 Capsule 0    vitamin D2, Ergocalciferol, (DRISDOL) 1.25 MG (74946 UT) Cap capsule Take 1 Capsule by mouth every 7 days. 5 Capsule 0    ALPRAZolam (XANAX) 0.25 MG Tab Take 0.25 mg by mouth 1 time a day as needed for Sleep or Anxiety.      amphetamine-dextroamphetamine (ADDERALL) 30 MG tablet Take 30 mg by mouth every day.      lamotrigine (LAMICTAL) 150 MG tablet Take 300 mg by mouth every day. 2 tablets= 300mg      lidocaine (ASPERFLEX) 4 % Patch Place 1 Patch on the skin every 24 hours. (12 hours on, 12 hours off) (Patient not taking: Reported on 6/25/2025) 30 Patch 0     No current facility-administered medications on file prior to visit.         EXAMINATION     Physical Exam:   /67   Pulse 90   Temp 36.4 °C (97.5 °F) (Temporal)   Ht 1.6 m (5' 3\")   Wt 58.8 kg (129 lb 10.1 oz)   SpO2 98%     Constitutional:   Body Habitus: Body mass index is 22.96 kg/m².  Cooperation: Fully cooperates with exam  Appearance: Well-groomed, well-nourished.    Eyes: No scleral icterus to suggest severe liver disease, no proptosis to suggest severe hyperthyroidism    ENT -no obvious auditory deficits, no noticeable facial droop     Skin -no rashes or lesions noted     Respiratory-  breathing comfortably on room air, no audible wheezing    Cardiovascular-distal " extremities warm and well perfused.  No lower extremity edema is noted.     Gastrointestinal - no obvious abdominal masses, non-distended    Psychiatric- alert and oriented ×3. Normal affect.     Gait stable, steady    Cervical spine   Inspection: No deformities of the skin over the cervical spine. No rashes or lesions.    Spurling's sign  negative bilaterally  Cervical facet loading maneuver  negative bilaterally    Tenderness to palpation at right and left upper trapezius reproducing the patient's pain     No signs of muscular atrophy in bilateral upper extremities     Key points for the international standards for neurological classification of spinal cord injury (ISNCSCI) to light touch.     Dermatome R L   C4 2 2   C5 2 2   C6 2 2   C7 2 2   C8 2 2   T1 2 2   T2 2 2       Motor Exam Upper Extremities   ? Myotome R L   Shoulder abduction C5 5 5   Elbow flexion C5 5 5   Wrist extension C6 5 5   Elbow extension C7 5 5   Finger flexion C8 5 5   Finger abduction T1 5 5      Thoracic/Lumbar Spine/Sacral Spine/Hips   Inspection: No evidence of atrophy in bilateral lower extremities throughout      Palpation:   SLR reproduces ipsilateral low back pain bilaterally    Slight tenderness to palpation over the  bilateral glutes, bilateral lumbar paraspinal muscles, bilateral thoracic paraspinal muscles reproducing patient's pain.  No tenderness to palpation of bilateral greater trochanters      Key points for the international standards for neurological classification of spinal cord injury (ISNCSCI) to light touch.   Dermatome R L   L2 2 2   L3 2 2   L4 2 2   L5 2 2   S1 2 2   S2 2 2         Motor Exam Lower Extremities  ? Myotome R L   Hip flexion L2 5 5   Knee extension L3 5 5   Ankle dorsiflexion L4 5 5   Toe extension L5 5 5   Ankle plantarflexion S1 5 5           MEDICAL DECISION MAKING    Medical records review: see under HPI section.     DATA    Labs: No new labs available for review since last visit.   Lab Results  "  Component Value Date/Time    SODIUM 136 06/19/2025 11:53 AM    POTASSIUM 4.0 06/19/2025 11:53 AM    CHLORIDE 106 06/19/2025 11:53 AM    CO2 20 06/19/2025 11:53 AM    ANION 10.0 06/19/2025 11:53 AM    GLUCOSE 92 06/19/2025 11:53 AM    BUN 12 06/19/2025 11:53 AM    CREATININE 0.61 06/19/2025 11:53 AM    CALCIUM 8.6 06/19/2025 11:53 AM    ASTSGOT 15 06/19/2025 11:53 AM    ALTSGPT 10 06/19/2025 11:53 AM    TBILIRUBIN 0.3 06/19/2025 11:53 AM    ALBUMIN 3.9 06/19/2025 11:53 AM    TOTPROTEIN 6.9 06/19/2025 11:53 AM    GLOBULIN 3.0 06/19/2025 11:53 AM    AGRATIO 1.3 06/19/2025 11:53 AM       Lab Results   Component Value Date/Time    PROTHROMBTM 13.0 04/28/2017 01:19 AM    INR 0.95 04/28/2017 01:19 AM        Lab Results   Component Value Date/Time    WBC 11.4 (H) 06/19/2025 11:53 AM    RBC 3.98 (L) 06/19/2025 11:53 AM    HEMOGLOBIN 12.1 06/19/2025 11:53 AM    HEMATOCRIT 37.2 06/19/2025 11:53 AM    MCV 93.5 06/19/2025 11:53 AM    MCH 30.4 06/19/2025 11:53 AM    MCHC 32.5 06/19/2025 11:53 AM    MPV 9.9 06/19/2025 11:53 AM    NEUTSPOLYS 60.40 06/19/2025 11:53 AM    LYMPHOCYTES 32.50 06/19/2025 11:53 AM    MONOCYTES 5.80 06/19/2025 11:53 AM    EOSINOPHILS 0.60 06/19/2025 11:53 AM    BASOPHILS 0.30 06/19/2025 11:53 AM    HYPOCHROMIA 1+ 06/06/2017 04:40 AM    ANISOCYTOSIS 2+ 06/29/2017 04:00 AM        No results found for: \"HBA1C\"     EMG 12/4/24 with Dr. Chambers  Electrophysiologic Impression:This is a normal study of the bilateral lower extremities. There is no electrophysiologic evidence for large fiber polyneuropathy or lumbosacral radiculopathy.     Imaging:   I personally reviewed following images, these are my reads  MRI lumbar spine 2/23/2025  Disc bulge at L4-5 contributing to mild left lateral recess stenosis and mild bilateral neuroforaminal stenosis.  Disc bulge at L5-S1 with no significant neuroforaminal stenosis. See formal radiology report for further details.    MRI thoracic spine 2/24/2025  No significant " central canal stenosis or neuroforaminal stenosis.  See formal radiology report for further details.    MRI cervical spine 2/24/2025  At C5-6 there is mild left neuroforaminal stenosis.  No significant central canal stenosis or neuroforaminal stenosis elsewhere.See formal radiology report for further details.    MRI lumbar spine 8/19/2024  Annular fissure at L5-S1.  Disc bulge at L4-5 and L5-S1 without significant neuroforaminal stenosis or central canal stenosis.See formal radiology report for further details.      MRI pelvis 8/19/2024  No evidence of sacroiliitis.  See formal radiology report for further details.      IMAGING radiology reads. I reviewed the following radiology reads                      Results for orders placed in visit on 08/19/24    MR-LUMBAR SPINE-W/O    Impression  Central disc protrusion at L4-5 and L5-S1 without significant canal stenosis or cauda equina impingement.    Small midline dorsal annular fissure at L5-S1. Annular fissures can represent an independent source of back pain and radicular symptoms by referred pain mechanisms.    Results for orders placed during the hospital encounter of 05/01/19    MR-LUMBAR SPINE-WITH & W/O    Impression  1.  Mild posterior disc bulging again seen at the L4-5 and L5-S1 levels, without evidence for neural compression.  2.  No lumbar spine fracture or segmental malalignment.  3.  No abnormal enhancement within the lumbar spine or spinal canal.    These findings were discussed with FRAN AKHTAR by Dr. Alvarado on the morning of 5/2/2019.      Results for orders placed in visit on 08/19/24    MR-LUMBAR SPINE-W/O    Impression  Central disc protrusion at L4-5 and L5-S1 without significant canal stenosis or cauda equina impingement.    Small midline dorsal annular fissure at L5-S1. Annular fissures can represent an independent source of back pain and radicular symptoms by referred pain mechanisms.   Results for orders placed during the hospital encounter  of 05/01/19    MR-THORACIC SPINE-WITH & W/O    Impression  1.  Multilevel mild disc bulging, without significant neural compression.  2.  No focal marrow edema to indicate fracture.  3.  No focal abnormal marrow signal to indicate tumor.  4.  No abnormal enhancement within the thoracic spine or spinal canal.    These findings were discussed with FRAN AKHTAR by Dr. Alvarado on the morning of 5/2/2019.              Results for orders placed in visit on 08/19/24    MR-PELVIS W/O    Impression  1. No sacroiliitis. No degenerative change or joint effusion.  2. No pulmonary edema or bone marrow replacement.  3. Apparent diffuse wall thickening of the visualized distal colon and rectum.                             Results for orders placed during the hospital encounter of 11/08/18    DX-CHEST-2 VIEWS    Impression  No acute cardiopulmonary disease.                    Results for orders placed during the hospital encounter of 04/06/17    DX-KNEES-AP BILATERAL STANDING    Impression  Slight varus angulation of the right knee joint.    Results for orders placed during the hospital encounter of 12/19/23    DX-LUMBAR SPINE-2 OR 3 VIEWS    Impression  Mild degenerative disc disease at L4-5 and L5-S1.  No malalignment.    Results for orders placed during the hospital encounter of 04/06/17    DX-PELVIS-1 OR 2 VIEWS    Impression  No acute fracture identified. If symptoms are persistent, MRI would be recommended for further evaluation.                Results for orders placed in visit on 06/07/24    DX-WRIST-COMPLETE 3+ LEFT   Results for orders placed in visit on 09/04/24    DX-WRIST-LIMITED 2- LEFT        Diagnosis  Visit Diagnoses     ICD-10-CM   1. Lumbar radiculopathy  M54.16   2. Myalgia  M79.10   3. Chronic bilateral low back pain without sciatica  M54.50    G89.29   4. Numbness and tingling of both legs  R20.0    R20.2   5. Other chronic pain  G89.29   6. Lumbar disc herniation  M51.26   7. Sacroiliac joint dysfunction of  both sides  M53.3   8. Cervicalgia  M54.2             ASSESSMENT AND PLAN:  Ngoc Morales (: 2000) is a female with      Ngoc was seen today for follow-up.    Diagnoses and all orders for this visit:    Lumbar radiculopathy  -     Pain Hospital Procedure; Future    Myalgia  -     Trigger Point Injections  -     Consent for all Surgical, Special Diagnostic or Therapeutic Procedures    Chronic bilateral low back pain without sciatica    Numbness and tingling of both legs    Other chronic pain    Lumbar disc herniation    Sacroiliac joint dysfunction of both sides    Cervicalgia    Other orders  -     lidocaine (Xylocaine) 1 % injection 5 mL  -     bupivacaine (pf) (Marcaine/Sensorcaine) 0.5 % injection 5 mL        The patient's back pain is likely due to a combination of factors, including bulging discs at L4-5 and L5-S1, and potential muscle strain.  I have discussed that the numbness and tingling in her legs could be a side effect of vincristine, a drug used in her chemotherapy.     Physical therapy:  -Referral to physical therapy previously placed, authorized.  Advised patient to pursue this    Imaging:  no new imaging needed at this time    Medications:  - agree with discontinuation of gabapentin 100mg TID given SE of mood issues  - continue meloxicam PRN   - continue methocarbamol QHS PRN     Injections:  - Repeat trigger point injections under ultrasound guidance given recurrence of pain which previously improved with this injection  - Repeat bilateral L5-S1 transforaminal epidural steroid injection under fluoroscopic guidance given recurrence of pain was previously improved with this injection. The risks, benefits, and alternatives to this procedure were discussed and the patient wishes to proceed with the procedure. Risks include but are not limited to damage to surrounding structures, infection, bleeding, worsening of pain which can be permanent, and weakness which can be permanent. Benefits  include pain relief and improved function. Alternatives include not doing the procedure.      Follow-up: 1 month after epidural    Orders Placed This [facility] Procedure    lidocaine (Xylocaine) 1 % injection 5 mL    bupivacaine (pf) (Marcaine/Sensorcaine) 0.5 % injection 5 mL    Consent for all Surgical, Special Diagnostic or Therapeutic Procedures       Jenelle Nguyen MD  Interventional Pain and Spine  Physical Medicine and Rehabilitation  [facility]    The above note documents my personal evaluation of this patient. In addition, I have reviewed and confirmed with the patient and MA the supportive information documented in today's Patient Health Questionnaire and Office Note.     Please note that this dictation was created using voice recognition software. I have made every reasonable attempt to correct obvious errors, but I expect that there are errors of grammar and possibly content that I did not discover before finalizing the note.

## 2025-06-25 NOTE — PROGRESS NOTES
Verbal consent was acquired by the patient to use [employer] ambient listening note generation during this visit Yes     Follow-up patient Note    Interventional Pain and Spine  Physiatry (Physical Medicine and Rehabilitation)     Patient Name: Ngoc Morales  : 2000  Date of service: 2025    Chief Complaint:   Chief Complaint   Patient presents with    Follow-Up     Back pain       HISTORY  Please see new patient note by Dr. Nguyen for more details.     HPI  Today's visit   Ngoc Morales ( 2000) is a female with The primary encounter diagnosis was Lumbar radiculopathy. Diagnoses of Myalgia, Chronic bilateral low back pain without sciatica, Numbness and tingling of both legs, Other chronic pain, Lumbar disc herniation, Sacroiliac joint dysfunction of both sides, and Cervicalgia were also pertinent to this visit.    History of Present Illness  The patient is a 24-year-old female who presents for follow-up.      The patient presents for evaluation of back and hip pain.    Bilateral low back pain  - Severe back pain, described as dull and sharp  - Radiates to her hips and mid-lateral thigh  - Prolonged sitting, standing, or walking exacerbates the pain  - Less severe pain between her shoulder blades    Also has pain at her bilateral rhomboid area that feels muscular in nature    Leg Weakness and Fatigue  - Mild leg weakness and fatigue, particularly when standing for extended periods    Pain Management  - Manages the pain with methocarbamol at night and meloxicam during the day  - Previous treatments, including an epidural and trigger point injections, provided some relief  - Epidural effects lasted longer  - Has not yet tried physical therapy    Procedure history:  -3/5/2025 trigger point injections -significant relief x 3-4 days  -3/13/2025 Lumbar Transforaminal Epidural Steroid Injection at the  right and left  L5-S1 levels.  50% improvement in index pain  - 25 trigger point  injections     ROS:   Red Flags ROS:   Fever, Chills, Sweats: Denies  Involuntary Weight Loss: Denies  Bladder Incontinence: Denies  Bowel Incontinence: denies  Saddle Anesthesia: Denies    All other systems reviewed and negative.     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/3/2019    Psychiatric problem     depression, anxiety    Urinary tract infection 5/3/2019       PSHx:   Past Surgical History:   Procedure Laterality Date    BLOCK EPIDURAL STEROID INJECTION Bilateral 3/13/2025    Procedure: RIGHT AND LEFT L5-S1 TRANSFORAMINAL EPIDURAL STEROID INJECTION;  Surgeon: Jenelle Nguyen M.D.;  Location: SURGERY [facility]GERY [facility];  Service: Pain Management    CATH REMOVAL  9/21/2017    Procedure: CATH REMOVAL- PORT;  Surgeon: Yoli Grullon M.D.;  Location: SURGERY [facility];  Service: General    GASTROSCOPY-ENDO  4/28/2017    Procedure: GASTROSCOPY-ENDO;  Surgeon: Everette Jett M.D.;  Location: ENDOSCOPY [facility];  Service:     CATH PLACEMENT Left 4/13/2017    Procedure: PORT PLACEMENT  ;  Surgeon: Yoli Grullon M.D.;  Location: SURGERY [facility];  Service:     BIOPSY GENERAL N/A 4/13/2017    Procedure: BONE MARROW BIOPSY AND ASPIRATION; LUMBAR PUNCTURE  ;  Surgeon: Yoli Grullon M.D.;  Location: SURGERY [facility];  Service:        Family Hx:   History reviewed. No pertinent family history.    Social Hx:  Social History     Socioeconomic History    Marital status: Single     Spouse name: Not on file    Number of children: Not on file    Years of education: Not on file    Highest education level: Not on file   Occupational History    Not on file   Tobacco Use    Smoking status: Never    Smokeless tobacco: Former    Tobacco comments:     Vapes nicotine   Vaping Use    Vaping status: Every Day    Substances: THC   Substance and Sexual  Activity    Alcohol use: Yes     Alcohol/week: 2.4 oz     Types: 2 Glasses of wine, 2 Cans of beer per week    Drug use: Yes     Types: Marijuana     Comment: marijuana    Sexual activity: Not on file   Other Topics Concern    Not on file   Social History Narrative    Not on file     Social Drivers of Health     Financial Resource Strain: Not on file   Food Insecurity: No Food Insecurity (2/23/2025)    Hunger Vital Sign     Worried About Running Out of Food in the Last Year: Never true     Ran Out of Food in the Last Year: Never true   Transportation Needs: No Transportation Needs (2/23/2025)    PRAPARE - Transportation     Lack of Transportation (Medical): No     Lack of Transportation (Non-Medical): No   Physical Activity: Not on file   Stress: Not on file   Social Connections: Not on file   Intimate Partner Violence: Not At Risk (2/23/2025)    Humiliation, Afraid, Rape, and Kick questionnaire     Fear of Current or Ex-Partner: No     Emotionally Abused: No     Physically Abused: No     Sexually Abused: No   Housing Stability: Low Risk  (2/23/2025)    Housing Stability Vital Sign     Unable to Pay for Housing in the Last Year: No     Number of Times Moved in the Last Year: 0     Homeless in the Last Year: No       Allergies:  Allergies   Allergen Reactions    Bactrim [Sulfamethoxazole W-Trimethoprim] Vomiting and Nausea       Medications: reviewed on epic.   Outpatient Medications Marked as Taking for the 6/25/25 encounter (Office Visit) with Jenelle Nguyen M.D.   Medication Sig Dispense Refill    benzonatate (TESSALON) 100 MG Cap Take 1 Capsule by mouth 3 times a day as needed for Cough. 30 Capsule 0    meloxicam (MOBIC) 7.5 MG Tab Take 1-2 Tablets by mouth every day. 30 Tablet 0    albuterol 108 (90 Base) MCG/ACT Aero Soln inhalation aerosol Inhale 1-2 Puffs every 6 hours as needed for Shortness of Breath (cough). 8.5 g 0    cetirizine (ZYRTEC) 10 MG Tab Take 1 Tablet by mouth every day. 30 Tablet 1     fluconazole (DIFLUCAN) 150 MG tablet If develop yeast infection, take 1 tablet.  If still symptomatic in 3 days, take second tablet. 2 Tablet 0    azithromycin (ZITHROMAX) 250 MG Tab Take 4 tablets today.  Then take 1 tablet po qd for the next 4 days. 8 Tablet 0    meloxicam (MOBIC) 15 MG tablet Take 1 Tablet by mouth 1 time a day as needed for Mild Pain or Moderate Pain. 30 Tablet 0    methocarbamol (ROBAXIN) 500 MG Tab Take 1 Tablet by mouth 4 times a day. 120 Tablet 0    acetaminophen (TYLENOL) 500 MG Tab Take 2 Tablets by mouth every 6 hours as needed for Mild Pain or Moderate Pain.      gabapentin (NEURONTIN) 300 MG Cap Take 1 Capsule by mouth 3 times a day. 90 Capsule 0    vitamin D2, Ergocalciferol, (DRISDOL) 1.25 MG (33736 UT) Cap capsule Take 1 Capsule by mouth every 7 days. 5 Capsule 0    ALPRAZolam (XANAX) 0.25 MG Tab Take 0.25 mg by mouth 1 time a day as needed for Sleep or Anxiety.      amphetamine-dextroamphetamine (ADDERALL) 30 MG tablet Take 30 mg by mouth every day.      lamotrigine (LAMICTAL) 150 MG tablet Take 300 mg by mouth every day. 2 tablets= 300mg       Current Facility-Administered Medications for the 6/25/25 encounter (Office Visit) with Jenelle Nguyen M.D.   Medication Dose Route Frequency Provider Last Rate Last Admin    lidocaine (Xylocaine) 1 % injection 5 mL  5 mL Injection Once         bupivacaine (pf) (Marcaine/Sensorcaine) 0.5 % injection 5 mL  5 mL Injection Once             Current Outpatient Medications on File Prior to Visit   Medication Sig Dispense Refill    benzonatate (TESSALON) 100 MG Cap Take 1 Capsule by mouth 3 times a day as needed for Cough. 30 Capsule 0    meloxicam (MOBIC) 7.5 MG Tab Take 1-2 Tablets by mouth every day. 30 Tablet 0    albuterol 108 (90 Base) MCG/ACT Aero Soln inhalation aerosol Inhale 1-2 Puffs every 6 hours as needed for Shortness of Breath (cough). 8.5 g 0    cetirizine (ZYRTEC) 10 MG Tab Take 1 Tablet by mouth every day. 30 Tablet 1     "fluconazole (DIFLUCAN) 150 MG tablet If develop yeast infection, take 1 tablet.  If still symptomatic in 3 days, take second tablet. 2 Tablet 0    azithromycin (ZITHROMAX) 250 MG Tab Take 4 tablets today.  Then take 1 tablet po qd for the next 4 days. 8 Tablet 0    meloxicam (MOBIC) 15 MG tablet Take 1 Tablet by mouth 1 time a day as needed for Mild Pain or Moderate Pain. 30 Tablet 0    methocarbamol (ROBAXIN) 500 MG Tab Take 1 Tablet by mouth 4 times a day. 120 Tablet 0    acetaminophen (TYLENOL) 500 MG Tab Take 2 Tablets by mouth every 6 hours as needed for Mild Pain or Moderate Pain.      gabapentin (NEURONTIN) 300 MG Cap Take 1 Capsule by mouth 3 times a day. 90 Capsule 0    vitamin D2, Ergocalciferol, (DRISDOL) 1.25 MG (14744 UT) Cap capsule Take 1 Capsule by mouth every 7 days. 5 Capsule 0    ALPRAZolam (XANAX) 0.25 MG Tab Take 0.25 mg by mouth 1 time a day as needed for Sleep or Anxiety.      amphetamine-dextroamphetamine (ADDERALL) 30 MG tablet Take 30 mg by mouth every day.      lamotrigine (LAMICTAL) 150 MG tablet Take 300 mg by mouth every day. 2 tablets= 300mg      lidocaine (ASPERFLEX) 4 % Patch Place 1 Patch on the skin every 24 hours. (12 hours on, 12 hours off) (Patient not taking: Reported on 6/25/2025) 30 Patch 0     No current facility-administered medications on file prior to visit.         EXAMINATION     Physical Exam:   /67   Pulse 90   Temp 36.4 °C (97.5 °F) (Temporal)   Ht 1.6 m (5' 3\")   Wt 58.8 kg (129 lb 10.1 oz)   SpO2 98%     Constitutional:   Body Habitus: Body mass index is 22.96 kg/m².  Cooperation: Fully cooperates with exam  Appearance: Well-groomed, well-nourished.    Eyes: No scleral icterus to suggest severe liver disease, no proptosis to suggest severe hyperthyroidism    ENT -no obvious auditory deficits, no noticeable facial droop     Skin -no rashes or lesions noted     Respiratory-  breathing comfortably on room air, no audible wheezing    Cardiovascular-distal " extremities warm and well perfused.  No lower extremity edema is noted.     Gastrointestinal - no obvious abdominal masses, non-distended    Psychiatric- alert and oriented ×3. Normal affect.     Gait stable, steady    Cervical spine   Inspection: No deformities of the skin over the cervical spine. No rashes or lesions.    Spurling's sign  negative bilaterally  Cervical facet loading maneuver  negative bilaterally    Tenderness to palpation at right and left upper trapezius reproducing the patient's pain     No signs of muscular atrophy in bilateral upper extremities     Key points for the international standards for neurological classification of spinal cord injury (ISNCSCI) to light touch.     Dermatome R L   C4 2 2   C5 2 2   C6 2 2   C7 2 2   C8 2 2   T1 2 2   T2 2 2       Motor Exam Upper Extremities   ? Myotome R L   Shoulder abduction C5 5 5   Elbow flexion C5 5 5   Wrist extension C6 5 5   Elbow extension C7 5 5   Finger flexion C8 5 5   Finger abduction T1 5 5      Thoracic/Lumbar Spine/Sacral Spine/Hips   Inspection: No evidence of atrophy in bilateral lower extremities throughout      Palpation:   SLR reproduces ipsilateral low back pain bilaterally    Slight tenderness to palpation over the  bilateral glutes, bilateral lumbar paraspinal muscles, bilateral thoracic paraspinal muscles reproducing patient's pain.  No tenderness to palpation of bilateral greater trochanters      Key points for the international standards for neurological classification of spinal cord injury (ISNCSCI) to light touch.   Dermatome R L   L2 2 2   L3 2 2   L4 2 2   L5 2 2   S1 2 2   S2 2 2         Motor Exam Lower Extremities  ? Myotome R L   Hip flexion L2 5 5   Knee extension L3 5 5   Ankle dorsiflexion L4 5 5   Toe extension L5 5 5   Ankle plantarflexion S1 5 5           MEDICAL DECISION MAKING    Medical records review: see under HPI section.     DATA    Labs: No new labs available for review since last visit.   Lab Results  "  Component Value Date/Time    SODIUM 136 06/19/2025 11:53 AM    POTASSIUM 4.0 06/19/2025 11:53 AM    CHLORIDE 106 06/19/2025 11:53 AM    CO2 20 06/19/2025 11:53 AM    ANION 10.0 06/19/2025 11:53 AM    GLUCOSE 92 06/19/2025 11:53 AM    BUN 12 06/19/2025 11:53 AM    CREATININE 0.61 06/19/2025 11:53 AM    CALCIUM 8.6 06/19/2025 11:53 AM    ASTSGOT 15 06/19/2025 11:53 AM    ALTSGPT 10 06/19/2025 11:53 AM    TBILIRUBIN 0.3 06/19/2025 11:53 AM    ALBUMIN 3.9 06/19/2025 11:53 AM    TOTPROTEIN 6.9 06/19/2025 11:53 AM    GLOBULIN 3.0 06/19/2025 11:53 AM    AGRATIO 1.3 06/19/2025 11:53 AM       Lab Results   Component Value Date/Time    PROTHROMBTM 13.0 04/28/2017 01:19 AM    INR 0.95 04/28/2017 01:19 AM        Lab Results   Component Value Date/Time    WBC 11.4 (H) 06/19/2025 11:53 AM    RBC 3.98 (L) 06/19/2025 11:53 AM    HEMOGLOBIN 12.1 06/19/2025 11:53 AM    HEMATOCRIT 37.2 06/19/2025 11:53 AM    MCV 93.5 06/19/2025 11:53 AM    MCH 30.4 06/19/2025 11:53 AM    MCHC 32.5 06/19/2025 11:53 AM    MPV 9.9 06/19/2025 11:53 AM    NEUTSPOLYS 60.40 06/19/2025 11:53 AM    LYMPHOCYTES 32.50 06/19/2025 11:53 AM    MONOCYTES 5.80 06/19/2025 11:53 AM    EOSINOPHILS 0.60 06/19/2025 11:53 AM    BASOPHILS 0.30 06/19/2025 11:53 AM    HYPOCHROMIA 1+ 06/06/2017 04:40 AM    ANISOCYTOSIS 2+ 06/29/2017 04:00 AM        No results found for: \"HBA1C\"     EMG 12/4/24 with Dr. Chambers  Electrophysiologic Impression:This is a normal study of the bilateral lower extremities. There is no electrophysiologic evidence for large fiber polyneuropathy or lumbosacral radiculopathy.     Imaging:   I personally reviewed following images, these are my reads  MRI lumbar spine 2/23/2025  Disc bulge at L4-5 contributing to mild left lateral recess stenosis and mild bilateral neuroforaminal stenosis.  Disc bulge at L5-S1 with no significant neuroforaminal stenosis. See formal radiology report for further details.    MRI thoracic spine 2/24/2025  No significant " central canal stenosis or neuroforaminal stenosis.  See formal radiology report for further details.    MRI cervical spine 2/24/2025  At C5-6 there is mild left neuroforaminal stenosis.  No significant central canal stenosis or neuroforaminal stenosis elsewhere.See formal radiology report for further details.    MRI lumbar spine 8/19/2024  Annular fissure at L5-S1.  Disc bulge at L4-5 and L5-S1 without significant neuroforaminal stenosis or central canal stenosis.See formal radiology report for further details.      MRI pelvis 8/19/2024  No evidence of sacroiliitis.  See formal radiology report for further details.      IMAGING radiology reads. I reviewed the following radiology reads                      Results for orders placed in visit on 08/19/24    MR-LUMBAR SPINE-W/O    Impression  Central disc protrusion at L4-5 and L5-S1 without significant canal stenosis or cauda equina impingement.    Small midline dorsal annular fissure at L5-S1. Annular fissures can represent an independent source of back pain and radicular symptoms by referred pain mechanisms.    Results for orders placed during the hospital encounter of 05/01/19    MR-LUMBAR SPINE-WITH & W/O    Impression  1.  Mild posterior disc bulging again seen at the L4-5 and L5-S1 levels, without evidence for neural compression.  2.  No lumbar spine fracture or segmental malalignment.  3.  No abnormal enhancement within the lumbar spine or spinal canal.    These findings were discussed with FRAN AKHTAR by Dr. Alvarado on the morning of 5/2/2019.      Results for orders placed in visit on 08/19/24    MR-LUMBAR SPINE-W/O    Impression  Central disc protrusion at L4-5 and L5-S1 without significant canal stenosis or cauda equina impingement.    Small midline dorsal annular fissure at L5-S1. Annular fissures can represent an independent source of back pain and radicular symptoms by referred pain mechanisms.   Results for orders placed during the hospital encounter  of 05/01/19    MR-THORACIC SPINE-WITH & W/O    Impression  1.  Multilevel mild disc bulging, without significant neural compression.  2.  No focal marrow edema to indicate fracture.  3.  No focal abnormal marrow signal to indicate tumor.  4.  No abnormal enhancement within the thoracic spine or spinal canal.    These findings were discussed with FRAN AKHTAR by Dr. Alvarado on the morning of 5/2/2019.              Results for orders placed in visit on 08/19/24    MR-PELVIS W/O    Impression  1. No sacroiliitis. No degenerative change or joint effusion.  2. No pulmonary edema or bone marrow replacement.  3. Apparent diffuse wall thickening of the visualized distal colon and rectum.                             Results for orders placed during the hospital encounter of 11/08/18    DX-CHEST-2 VIEWS    Impression  No acute cardiopulmonary disease.                    Results for orders placed during the hospital encounter of 04/06/17    DX-KNEES-AP BILATERAL STANDING    Impression  Slight varus angulation of the right knee joint.    Results for orders placed during the hospital encounter of 12/19/23    DX-LUMBAR SPINE-2 OR 3 VIEWS    Impression  Mild degenerative disc disease at L4-5 and L5-S1.  No malalignment.    Results for orders placed during the hospital encounter of 04/06/17    DX-PELVIS-1 OR 2 VIEWS    Impression  No acute fracture identified. If symptoms are persistent, MRI would be recommended for further evaluation.                Results for orders placed in visit on 06/07/24    DX-WRIST-COMPLETE 3+ LEFT   Results for orders placed in visit on 09/04/24    DX-WRIST-LIMITED 2- LEFT        Diagnosis  Visit Diagnoses     ICD-10-CM   1. Lumbar radiculopathy  M54.16   2. Myalgia  M79.10   3. Chronic bilateral low back pain without sciatica  M54.50    G89.29   4. Numbness and tingling of both legs  R20.0    R20.2   5. Other chronic pain  G89.29   6. Lumbar disc herniation  M51.26   7. Sacroiliac joint dysfunction of  both sides  M53.3   8. Cervicalgia  M54.2             ASSESSMENT AND PLAN:  Ngoc Morales (: 2000) is a female with      Ngoc was seen today for follow-up.    Diagnoses and all orders for this visit:    Lumbar radiculopathy  -     Pain Hospital Procedure; Future    Myalgia  -     Trigger Point Injections  -     Consent for all Surgical, Special Diagnostic or Therapeutic Procedures    Chronic bilateral low back pain without sciatica    Numbness and tingling of both legs    Other chronic pain    Lumbar disc herniation    Sacroiliac joint dysfunction of both sides    Cervicalgia    Other orders  -     lidocaine (Xylocaine) 1 % injection 5 mL  -     bupivacaine (pf) (Marcaine/Sensorcaine) 0.5 % injection 5 mL        The patient's back pain is likely due to a combination of factors, including bulging discs at L4-5 and L5-S1, and potential muscle strain.  I have discussed that the numbness and tingling in her legs could be a side effect of vincristine, a drug used in her chemotherapy.     Physical therapy:  -Referral to physical therapy previously placed, authorized.  Advised patient to pursue this    Imaging:  no new imaging needed at this time    Medications:  - agree with discontinuation of gabapentin 100mg TID given SE of mood issues  - continue meloxicam PRN   - continue methocarbamol QHS PRN     Injections:  - Repeat trigger point injections under ultrasound guidance given recurrence of pain which previously improved with this injection  - Repeat bilateral L5-S1 transforaminal epidural steroid injection under fluoroscopic guidance given recurrence of pain was previously improved with this injection. The risks, benefits, and alternatives to this procedure were discussed and the patient wishes to proceed with the procedure. Risks include but are not limited to damage to surrounding structures, infection, bleeding, worsening of pain which can be permanent, and weakness which can be permanent. Benefits  include pain relief and improved function. Alternatives include not doing the procedure.      Follow-up: 1 month after epidural    Orders Placed This [facility] Procedure    lidocaine (Xylocaine) 1 % injection 5 mL    bupivacaine (pf) (Marcaine/Sensorcaine) 0.5 % injection 5 mL    Consent for all Surgical, Special Diagnostic or Therapeutic Procedures       Jenelle Nguyen MD  Interventional Pain and Spine  Physical Medicine and Rehabilitation  [facility]    The above note documents my personal evaluation of this patient. In addition, I have reviewed and confirmed with the patient and MA the supportive information documented in today's Patient Health Questionnaire and Office Note.     Please note that this dictation was created using voice recognition software. I have made every reasonable attempt to correct obvious errors, but I expect that there are errors of grammar and possibly content that I did not discover before finalizing the note.

## 2025-07-07 ENCOUNTER — APPOINTMENT (OUTPATIENT)
Dept: RADIOLOGY | Facility: MEDICAL CENTER | Age: 25
End: 2025-07-07
Attending: STUDENT IN AN ORGANIZED HEALTH CARE EDUCATION/TRAINING PROGRAM
Payer: COMMERCIAL

## 2025-07-07 ENCOUNTER — HOSPITAL ENCOUNTER (EMERGENCY)
Facility: MEDICAL CENTER | Age: 25
End: 2025-07-07
Attending: STUDENT IN AN ORGANIZED HEALTH CARE EDUCATION/TRAINING PROGRAM
Payer: COMMERCIAL

## 2025-07-07 VITALS
DIASTOLIC BLOOD PRESSURE: 66 MMHG | WEIGHT: 130 LBS | BODY MASS INDEX: 23.04 KG/M2 | OXYGEN SATURATION: 97 % | TEMPERATURE: 97.7 F | HEART RATE: 86 BPM | SYSTOLIC BLOOD PRESSURE: 103 MMHG | HEIGHT: 63 IN | RESPIRATION RATE: 20 BRPM

## 2025-07-07 DIAGNOSIS — R07.9 CHEST PAIN, UNSPECIFIED TYPE: Primary | ICD-10-CM

## 2025-07-07 DIAGNOSIS — R06.2 WHEEZING: ICD-10-CM

## 2025-07-07 DIAGNOSIS — R05.3 CHRONIC COUGH: ICD-10-CM

## 2025-07-07 LAB
ALBUMIN SERPL BCP-MCNC: 4.6 G/DL (ref 3.2–4.9)
ALBUMIN/GLOB SERPL: 1.3 G/DL
ALP SERPL-CCNC: 120 U/L (ref 30–99)
ALT SERPL-CCNC: 12 U/L (ref 2–50)
ANION GAP SERPL CALC-SCNC: 12 MMOL/L (ref 7–16)
AST SERPL-CCNC: 20 U/L (ref 12–45)
BASOPHILS # BLD AUTO: 0.3 % (ref 0–1.8)
BASOPHILS # BLD: 0.03 K/UL (ref 0–0.12)
BILIRUB SERPL-MCNC: 0.5 MG/DL (ref 0.1–1.5)
BUN SERPL-MCNC: 13 MG/DL (ref 8–22)
CALCIUM ALBUM COR SERPL-MCNC: 9 MG/DL (ref 8.5–10.5)
CALCIUM SERPL-MCNC: 9.5 MG/DL (ref 8.5–10.5)
CHLORIDE SERPL-SCNC: 105 MMOL/L (ref 96–112)
CO2 SERPL-SCNC: 20 MMOL/L (ref 20–33)
CREAT SERPL-MCNC: 0.66 MG/DL (ref 0.5–1.4)
D DIMER PPP IA.FEU-MCNC: <0.27 UG/ML (FEU) (ref 0–0.5)
EKG IMPRESSION: NORMAL
EOSINOPHIL # BLD AUTO: 0.05 K/UL (ref 0–0.51)
EOSINOPHIL NFR BLD: 0.4 % (ref 0–6.9)
ERYTHROCYTE [DISTWIDTH] IN BLOOD BY AUTOMATED COUNT: 43.6 FL (ref 35.9–50)
GFR SERPLBLD CREATININE-BSD FMLA CKD-EPI: 125 ML/MIN/1.73 M 2
GLOBULIN SER CALC-MCNC: 3.6 G/DL (ref 1.9–3.5)
GLUCOSE SERPL-MCNC: 93 MG/DL (ref 65–99)
HCG SERPL QL: NEGATIVE
HCT VFR BLD AUTO: 42.6 % (ref 37–47)
HGB BLD-MCNC: 13.9 G/DL (ref 12–16)
IMM GRANULOCYTES # BLD AUTO: 0.04 K/UL (ref 0–0.11)
IMM GRANULOCYTES NFR BLD AUTO: 0.3 % (ref 0–0.9)
LIPASE SERPL-CCNC: 18 U/L (ref 11–82)
LYMPHOCYTES # BLD AUTO: 3.51 K/UL (ref 1–4.8)
LYMPHOCYTES NFR BLD: 30.5 % (ref 22–41)
MCH RBC QN AUTO: 29.4 PG (ref 27–33)
MCHC RBC AUTO-ENTMCNC: 32.6 G/DL (ref 32.2–35.5)
MCV RBC AUTO: 90.1 FL (ref 81.4–97.8)
MONOCYTES # BLD AUTO: 0.61 K/UL (ref 0–0.85)
MONOCYTES NFR BLD AUTO: 5.3 % (ref 0–13.4)
NEUTROPHILS # BLD AUTO: 7.26 K/UL (ref 1.82–7.42)
NEUTROPHILS NFR BLD: 63.2 % (ref 44–72)
NRBC # BLD AUTO: 0 K/UL
NRBC BLD-RTO: 0 /100 WBC (ref 0–0.2)
NT-PROBNP SERPL IA-MCNC: <36 PG/ML (ref 0–125)
PLATELET # BLD AUTO: 334 K/UL (ref 164–446)
PMV BLD AUTO: 9.6 FL (ref 9–12.9)
POTASSIUM SERPL-SCNC: 3.8 MMOL/L (ref 3.6–5.5)
PROT SERPL-MCNC: 8.2 G/DL (ref 6–8.2)
RBC # BLD AUTO: 4.73 M/UL (ref 4.2–5.4)
SODIUM SERPL-SCNC: 137 MMOL/L (ref 135–145)
TROPONIN T SERPL-MCNC: <6 NG/L (ref 6–19)
WBC # BLD AUTO: 11.5 K/UL (ref 4.8–10.8)

## 2025-07-07 PROCEDURE — 700101 HCHG RX REV CODE 250: Mod: UD | Performed by: STUDENT IN AN ORGANIZED HEALTH CARE EDUCATION/TRAINING PROGRAM

## 2025-07-07 PROCEDURE — 85379 FIBRIN DEGRADATION QUANT: CPT

## 2025-07-07 PROCEDURE — 85025 COMPLETE CBC W/AUTO DIFF WBC: CPT

## 2025-07-07 PROCEDURE — 83690 ASSAY OF LIPASE: CPT

## 2025-07-07 PROCEDURE — 94640 AIRWAY INHALATION TREATMENT: CPT

## 2025-07-07 PROCEDURE — 84484 ASSAY OF TROPONIN QUANT: CPT

## 2025-07-07 PROCEDURE — 80053 COMPREHEN METABOLIC PANEL: CPT

## 2025-07-07 PROCEDURE — 93005 ELECTROCARDIOGRAM TRACING: CPT | Mod: TC | Performed by: STUDENT IN AN ORGANIZED HEALTH CARE EDUCATION/TRAINING PROGRAM

## 2025-07-07 PROCEDURE — 84703 CHORIONIC GONADOTROPIN ASSAY: CPT

## 2025-07-07 PROCEDURE — 83880 ASSAY OF NATRIURETIC PEPTIDE: CPT

## 2025-07-07 PROCEDURE — 99283 EMERGENCY DEPT VISIT LOW MDM: CPT

## 2025-07-07 PROCEDURE — 71045 X-RAY EXAM CHEST 1 VIEW: CPT

## 2025-07-07 PROCEDURE — 93005 ELECTROCARDIOGRAM TRACING: CPT | Mod: TC

## 2025-07-07 PROCEDURE — 36415 COLL VENOUS BLD VENIPUNCTURE: CPT

## 2025-07-07 RX ORDER — MOMETASONE FUROATE MONOHYDRATE 50 UG/1
2 SPRAY, METERED NASAL DAILY
Qty: 17 G | Refills: 0 | Status: SHIPPED | OUTPATIENT
Start: 2025-07-07

## 2025-07-07 RX ORDER — NAPROXEN 500 MG/1
500 TABLET ORAL 2 TIMES DAILY WITH MEALS
Qty: 60 TABLET | Refills: 0 | Status: SHIPPED | OUTPATIENT
Start: 2025-07-07

## 2025-07-07 RX ORDER — LORATADINE 10 MG/1
10 TABLET ORAL DAILY
Qty: 30 TABLET | Refills: 0 | Status: SHIPPED | OUTPATIENT
Start: 2025-07-07

## 2025-07-07 RX ORDER — IPRATROPIUM BROMIDE AND ALBUTEROL SULFATE 2.5; .5 MG/3ML; MG/3ML
3 SOLUTION RESPIRATORY (INHALATION) ONCE
Status: COMPLETED | OUTPATIENT
Start: 2025-07-07 | End: 2025-07-07

## 2025-07-07 RX ORDER — ALBUTEROL SULFATE 90 UG/1
2 INHALANT RESPIRATORY (INHALATION) EVERY 6 HOURS PRN
Qty: 8.5 G | Refills: 0 | Status: SHIPPED | OUTPATIENT
Start: 2025-07-07

## 2025-07-07 RX ORDER — LIDOCAINE 4 G/G
1 PATCH TOPICAL EVERY 12 HOURS
Qty: 1 PATCH | Refills: 0 | Status: SHIPPED | OUTPATIENT
Start: 2025-07-07

## 2025-07-07 RX ORDER — ACETAMINOPHEN 500 MG
500-1000 TABLET ORAL EVERY 6 HOURS PRN
Qty: 30 TABLET | Refills: 0 | Status: SHIPPED | OUTPATIENT
Start: 2025-07-07

## 2025-07-07 RX ORDER — PREDNISONE 50 MG/1
50 TABLET ORAL DAILY
Qty: 5 TABLET | Refills: 0 | Status: SHIPPED | OUTPATIENT
Start: 2025-07-07

## 2025-07-07 RX ORDER — ALBUTEROL SULFATE 0.83 MG/ML
2.5 SOLUTION RESPIRATORY (INHALATION) EVERY 4 HOURS PRN
Qty: 30 EACH | Refills: 0 | Status: SHIPPED | OUTPATIENT
Start: 2025-07-07

## 2025-07-07 RX ADMIN — IPRATROPIUM BROMIDE AND ALBUTEROL SULFATE 3 ML: .5; 2.5 SOLUTION RESPIRATORY (INHALATION) at 15:17

## 2025-07-07 ASSESSMENT — HEART SCORE
TROPONIN: LESS THAN OR EQUAL TO NORMAL LIMIT
AGE: <45
RISK FACTORS: NO KNOWN RISK FACTORS
HEART SCORE: 0
HISTORY: SLIGHTLY SUSPICIOUS
ECG: NORMAL

## 2025-07-07 ASSESSMENT — PAIN DESCRIPTION - PAIN TYPE: TYPE: ACUTE PAIN

## 2025-07-07 ASSESSMENT — FIBROSIS 4 INDEX: FIB4 SCORE: 0.31

## 2025-07-07 NOTE — ED NOTES
Discharge instructions provided to and reviewed with patient. Patient to follow with PCP as needed. Patient agreeable to DC plan. Discussed to return to ER if symptoms worsen. Patient verbalized understanding. Pt ambulated to raghu

## 2025-07-07 NOTE — ED TRIAGE NOTES
"Chief Complaint   Patient presents with    Chest Pain     Pt reports right sided chest pain radiating to back intermittent x 3 weeks. Pt reports pain has worsened over the last 2 days.   Pt states she was seen at [facility] with negative workup.        23 yo F to triage for above complaint. EKG completed.      Pt placed in lobby. Pt educated on triage process. Pt encouraged to alert staff for any changes.     Patient and staff wearing appropriate PPE    /66   Pulse 87   Temp 36.5 °C (97.7 °F) (Temporal)   Resp 20   Ht 1.6 m (5' 3\")   Wt 59 kg (130 lb)   SpO2 98%   BMI 23.03 kg/m²     "

## 2025-07-07 NOTE — ED PROVIDER NOTES
CHIEF COMPLAINT  Chief Complaint   Patient presents with    Chest Pain     Pt reports right sided chest pain radiating to back intermittent x 3 weeks. Pt reports pain has worsened over the last 2 days.   Pt states she was seen at [facility] with negative workup.        LIMITATION TO HISTORY   Select: None    HPI    Ngoc Morales is a 24 y.o. female who presents to the Emergency Department presents for evaluation of an ongoing cough which she states has been ongoing for the past 3 weeks.  Has also had intermittent episodes of a right sided chest pain which radiates to her back Radhames worse over the past 2 days as well as a right sided lower rib pain which has been present intermittently for the past 3 months.  Has been seen in the emergency department as well as urgent cares for this, did complete a course of antibiotics.  Mother reports patient has a history of asthma has had some increased wheezing.    OUTSIDE HISTORIAN(S):  Select: Mother reports increased wheezing    EXTERNAL RECORDS REVIEWED  Select: Other reviewed the patient's last ED visit had a CTA which was negative, reviewed her urgent care visit from 617, was seen for rib pain and a cough, was given Tessalon as well as promethazine      PAST MEDICAL HISTORY  Past Medical History[1]  .    SURGICAL HISTORY  Past Surgical History[2]      FAMILY HISTORY  History reviewed. No pertinent family history.       SOCIAL HISTORY  Social History     Socioeconomic History    Marital status: Single     Spouse name: Not on file    Number of children: Not on file    Years of education: Not on file    Highest education level: Not on file   Occupational History    Not on file   Tobacco Use    Smoking status: Never    Smokeless tobacco: Former    Tobacco comments:     Vapes nicotine   Vaping Use    Vaping status: Every Day    Substances: THC   Substance and Sexual Activity    Alcohol use: Yes     Alcohol/week: 2.4 oz     Types: 2 Glasses of wine, 2 Cans of beer per  "week    Drug use: Yes     Types: Marijuana     Comment: marijuana    Sexual activity: Not on file   Other Topics Concern    Not on file   Social History Narrative    Not on file     Social Drivers of Health     Financial Resource Strain: Not on file   Food Insecurity: No Food Insecurity (2/23/2025)    Hunger Vital Sign     Worried About Running Out of Food in the Last Year: Never true     Ran Out of Food in the Last Year: Never true   Transportation Needs: No Transportation Needs (2/23/2025)    PRAPARE - Transportation     Lack of Transportation (Medical): No     Lack of Transportation (Non-Medical): No   Physical Activity: Not on file   Stress: Not on file   Social Connections: Not on file   Intimate Partner Violence: Not At Risk (2/23/2025)    Humiliation, Afraid, Rape, and Kick questionnaire     Fear of Current or Ex-Partner: No     Emotionally Abused: No     Physically Abused: No     Sexually Abused: No   Housing Stability: Low Risk  (2/23/2025)    Housing Stability Vital Sign     Unable to Pay for Housing in the Last Year: No     Number of Times Moved in the Last Year: 0     Homeless in the Last Year: No         CURRENT MEDICATIONS  Medications Ordered Prior to Encounter[3]        ALLERGIES  Allergies[4]    PHYSICAL EXAM  VITAL SIGNS:/66   Pulse 61   Temp 36.5 °C (97.7 °F) (Temporal)   Resp 16   Ht 1.6 m (5' 3\")   Wt 59 kg (130 lb)   SpO2 95%   BMI 23.03 kg/m²       VITALS - vital signs documented prior to this note have been reviewed and noted,  GENERAL - awake, alert, oriented, GCS 15, no apparent distress, non-toxic  appearing  HEENT - normocephalic, atraumatic, pupils equal, sclera anicteric, mucus  membranes moist  NECK - supple, no meningismus, full active range of motion, trachea midline  CARDIOVASCULAR - regular rate/rhythm, no murmurs/gallops/rubs  PULMONARY -mild end expiratory wheezing  GASTROINTESTINAL - soft, non-tender, non-distended, no rebound, guarding,  or " peritonitis  GENITOURINARY - Deferred  NEUROLOGIC - Awake alert, normal mental status, speech fluid, cognition  normal, moves all extremities  MUSCULOSKELETAL - no obvious asymmetry or deformities present  EXTREMITIES - warm, well-perfused, no cyanosis or significant edema  DERMATOLOGIC - warm, dry, no rashes, no jaundice  PSYCHIATRIC - normal affect, normal insight, normal concentration    DIAGNOSTIC STUDIES / PROCEDURES  EKG  I have independently interpreted this EKG  Interpreted below by myself as EKG shows a normal sinus rhythm with a normal FL QRS QTc interval there is a normal axis.  There is no ST elevation or depression to suggest ischemia no abnormal T wave inversion.  There is no STEMI pattern.  Interpretation is normal sinus rhythm as interpreted by myself      LABS  Labs Reviewed   CBC WITH DIFFERENTIAL - Abnormal; Notable for the following components:       Result Value    WBC 11.5 (*)     All other components within normal limits   COMP METABOLIC PANEL - Abnormal; Notable for the following components:    Alkaline Phosphatase 120 (*)     Globulin 3.6 (*)     All other components within normal limits   TROPONIN   D-DIMER   HCG QUAL SERUM   LIPASE   PROBRAIN NATRIURETIC PEPTIDE, NT   ESTIMATED GFR       Troponin negative heart score 0  RADIOLOGY  I have independently interpreted the diagnostic imaging associated with this visit and am waiting the final reading from the radiologist.   My preliminary interpretation is as follows: Chest x-ray shows no pneumothorax      Radiologist interpretation:   DX-CHEST-PORTABLE (1 VIEW)   Final Result      No acute cardiopulmonary disease evident.           COURSE & MEDICAL DECISION MAKING    ED COURSE:        INTERVENTIONS BY ME:  Medications   ipratropium-albuterol (DUONEB) nebulizer solution (3 mL Nebulization Given 7/7/25 1517)       Response on recheck: On reevaluation patient's wheezing is resolved.        CHEST PAIN:   HEART Score for Major Cardiac Events  HEART  Score     History: Slightly suspicious  ECG: Normal  Age: <45  Risk Factors: No known risk factors  Troponin: Less than or equal to normal limit    Heart Score: 0    Total Score   0-3 Points = Low Score, risk of MACE 0.9-1.7%.  4-6 Points = Moderate Score, risk of MACE 12-16.6%  7-10 Points = High Score, risk of MACE 50-65%     INITIAL ASSESSMENT, COURSE AND PLAN  Care Narrative: Patient presented for evaluation chest pain with associated cough and wheezing which has been ongoing for the past 3 months.  Differential included was not limited to reactive airway disease, pneumonia, PE, musculoskeletal strain spasm, pleuritis among many other considerations.  An EKG was obtained was without acute ischemic changes.  Chest x-ray showed no evidence of pneumonia pleural effusion or pneumothorax.  A D-dimer was obtained was negative troponin is negative.  Pain does seem to be worse with inspiration and associated with her cough, suspect there may be a component of pleurisy or intercostal muscle strain.  Will recommend NSAIDs Tylenol and lidocaine patch for this.  Initially patient did have some slight wheezing this resolved after a single DuoNeb.  Will place patient on a course of steroids write her for an albuterol inhaler, and start her on Claritin as well as Nasonex.  Otherwise at this point the patient's vital signs are stable, breathing is improved after single DuoNeb do believe she is appropriate for outpatient management.  Return precautions were discussed and she was encouraged to follow-up with her primary care.           ADDITIONAL PROBLEM LIST    DISPOSITION AND DISCUSSIONS    Discussion of management with other QHP or appropriate source(s): RT for breathing treatment         Barriers to care at this time, including but not limited to: Patient does not have established PCP.  Referral placed    Decision tools and prescription drugs considered including, but not limited to: Prednisone.    FINAL DIAGNOSIS  1. Chest  pain, unspecified type    2. Wheezing    3. 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    Psychiatric problem     depression, anxiety    Urinary tract infection 5/3/2019   [2]   Past Surgical History:  Procedure Laterality Date    BLOCK EPIDURAL STEROID INJECTION Bilateral 3/13/2025    Procedure: RIGHT AND LEFT L5-S1 TRANSFORAMINAL EPIDURAL STEROID INJECTION;  Surgeon: Jenelle Nguyen M.D.;  Location: SURGERY [facility];  Service: Pain Management    CATH REMOVAL  9/21/2017    Procedure: CATH REMOVAL- PORT;  Surgeon: Yoli Grullon M.D.;  Location: SURGERY [facility];  Service: General    GASTROSCOPY-ENDO  4/28/2017    Procedure: GASTROSCOPY-ENDO;  Surgeon: Everette Jett M.D.;  Location: ENDOSCOPY [facility];  Service:     CATH PLACEMENT Left 4/13/2017    Procedure: PORT PLACEMENT  ;  Surgeon: Yoli Grullon M.D.;  Location: SURGERY [facility];  Service:     BIOPSY GENERAL N/A 4/13/2017    Procedure: BONE MARROW BIOPSY AND ASPIRATION; LUMBAR PUNCTURE  ;  Surgeon: Yoli Grullon M.D.;  Location: SURGERY [facility];  Service:    [3]   No current facility-administered medications on file prior to encounter.     Current Outpatient Medications on File Prior to Encounter   Medication Sig Dispense Refill    benzonatate (TESSALON) 100 MG Cap Take 1 Capsule by mouth 3 times a day as needed for Cough. 30 Capsule 0    meloxicam (MOBIC) 7.5 MG Tab Take 1-2 Tablets by mouth every day. 30 Tablet 0    albuterol 108 (90 Base) MCG/ACT Aero Soln inhalation aerosol Inhale 1-2 Puffs every 6 hours as needed for Shortness of Breath (cough). 8.5 g 0    cetirizine (ZYRTEC) 10 MG Tab Take 1 Tablet by mouth every day. 30 Tablet 1    fluconazole (DIFLUCAN) 150 MG  tablet If develop yeast infection, take 1 tablet.  If still symptomatic in 3 days, take second tablet. 2 Tablet 0    azithromycin (ZITHROMAX) 250 MG Tab Take 4 tablets today.  Then take 1 tablet po qd for the next 4 days. 8 Tablet 0    meloxicam (MOBIC) 15 MG tablet Take 1 Tablet by mouth 1 time a day as needed for Mild Pain or Moderate Pain. 30 Tablet 0    methocarbamol (ROBAXIN) 500 MG Tab Take 1 Tablet by mouth 4 times a day. 120 Tablet 0    acetaminophen (TYLENOL) 500 MG Tab Take 2 Tablets by mouth every 6 hours as needed for Mild Pain or Moderate Pain.      gabapentin (NEURONTIN) 300 MG Cap Take 1 Capsule by mouth 3 times a day. 90 Capsule 0    lidocaine (ASPERFLEX) 4 % Patch Place 1 Patch on the skin every 24 hours. (12 hours on, 12 hours off) (Patient not taking: Reported on 6/25/2025) 30 Patch 0    vitamin D2, Ergocalciferol, (DRISDOL) 1.25 MG (72802 UT) Cap capsule Take 1 Capsule by mouth every 7 days. 5 Capsule 0    ALPRAZolam (XANAX) 0.25 MG Tab Take 0.25 mg by mouth 1 time a day as needed for Sleep or Anxiety.      amphetamine-dextroamphetamine (ADDERALL) 30 MG tablet Take 30 mg by mouth every day.      lamotrigine (LAMICTAL) 150 MG tablet Take 300 mg by mouth every day. 2 tablets= 300mg     [4]   Allergies  Allergen Reactions    Bactrim [Sulfamethoxazole W-Trimethoprim] Vomiting and Nausea

## 2025-07-10 ENCOUNTER — HOSPITAL ENCOUNTER (OUTPATIENT)
Facility: MEDICAL CENTER | Age: 25
End: 2025-07-10
Attending: STUDENT IN AN ORGANIZED HEALTH CARE EDUCATION/TRAINING PROGRAM | Admitting: STUDENT IN AN ORGANIZED HEALTH CARE EDUCATION/TRAINING PROGRAM
Payer: COMMERCIAL

## 2025-07-10 ENCOUNTER — APPOINTMENT (OUTPATIENT)
Dept: RADIOLOGY | Facility: MEDICAL CENTER | Age: 25
End: 2025-07-10
Attending: STUDENT IN AN ORGANIZED HEALTH CARE EDUCATION/TRAINING PROGRAM
Payer: COMMERCIAL

## 2025-07-10 VITALS
HEIGHT: 63 IN | SYSTOLIC BLOOD PRESSURE: 105 MMHG | RESPIRATION RATE: 16 BRPM | OXYGEN SATURATION: 98 % | TEMPERATURE: 98.2 F | WEIGHT: 127.87 LBS | BODY MASS INDEX: 22.66 KG/M2 | HEART RATE: 68 BPM | DIASTOLIC BLOOD PRESSURE: 66 MMHG

## 2025-07-10 PROCEDURE — 160048 HCHG OR STATISTICAL LEVEL 1-5: Performed by: STUDENT IN AN ORGANIZED HEALTH CARE EDUCATION/TRAINING PROGRAM

## 2025-07-10 PROCEDURE — 160028 HCHG SURGERY MINUTES - 1ST 30 MINS LEVEL 3: Performed by: STUDENT IN AN ORGANIZED HEALTH CARE EDUCATION/TRAINING PROGRAM

## 2025-07-10 PROCEDURE — 99152 MOD SED SAME PHYS/QHP 5/>YRS: CPT | Performed by: STUDENT IN AN ORGANIZED HEALTH CARE EDUCATION/TRAINING PROGRAM

## 2025-07-10 PROCEDURE — 700111 HCHG RX REV CODE 636 W/ 250 OVERRIDE (IP): Performed by: STUDENT IN AN ORGANIZED HEALTH CARE EDUCATION/TRAINING PROGRAM

## 2025-07-10 PROCEDURE — 160025 RECOVERY II MINUTES (STATS): Performed by: STUDENT IN AN ORGANIZED HEALTH CARE EDUCATION/TRAINING PROGRAM

## 2025-07-10 PROCEDURE — 160015 HCHG STAT PREOP MINUTES: Performed by: STUDENT IN AN ORGANIZED HEALTH CARE EDUCATION/TRAINING PROGRAM

## 2025-07-10 PROCEDURE — 700117 HCHG RX CONTRAST REV CODE 255: Performed by: STUDENT IN AN ORGANIZED HEALTH CARE EDUCATION/TRAINING PROGRAM

## 2025-07-10 PROCEDURE — 160046 HCHG PACU - 1ST 60 MINS PHASE II: Performed by: STUDENT IN AN ORGANIZED HEALTH CARE EDUCATION/TRAINING PROGRAM

## 2025-07-10 PROCEDURE — 99152 MOD SED SAME PHYS/QHP 5/>YRS: CPT

## 2025-07-10 RX ORDER — DEXAMETHASONE SODIUM PHOSPHATE 4 MG/ML
INJECTION, SOLUTION INTRA-ARTICULAR; INTRALESIONAL; INTRAMUSCULAR; INTRAVENOUS; SOFT TISSUE
Status: DISCONTINUED | OUTPATIENT
Start: 2025-07-10 | End: 2025-07-10 | Stop reason: HOSPADM

## 2025-07-10 RX ORDER — LIDOCAINE HYDROCHLORIDE 10 MG/ML
INJECTION, SOLUTION INFILTRATION; PERINEURAL
Status: DISCONTINUED | OUTPATIENT
Start: 2025-07-10 | End: 2025-07-10 | Stop reason: HOSPADM

## 2025-07-10 RX ORDER — MIDAZOLAM HYDROCHLORIDE 1 MG/ML
INJECTION INTRAMUSCULAR; INTRAVENOUS
Status: DISCONTINUED | OUTPATIENT
Start: 2025-07-10 | End: 2025-07-10 | Stop reason: HOSPADM

## 2025-07-10 ASSESSMENT — PAIN DESCRIPTION - PAIN TYPE
TYPE: CHRONIC PAIN

## 2025-07-10 ASSESSMENT — FIBROSIS 4 INDEX: FIB4 SCORE: 0.41

## 2025-07-10 NOTE — OP REPORT
Date of Service: 07/10/25    Patient: Ngoc Morales 24 y.o. female     MRN: [id number]     Physician/s: Jenelle Nguyen MD    Pre-operative Diagnosis: Lumbar radiculopathy    Post-operative Diagnosis: Lumbar radiculopathy    Procedure: Lumbar Transforaminal Epidural Steroid Injection at the  right and left  L5-S1 levels.     Description of procedure:    The risks, benefits, and alternatives of the procedure were reviewed and discussed with the patient.  Written informed consent was freely obtained. A pre-procedural time-out was conducted by the physician verifying patient’s identity, procedure to be performed, procedure site and side, and allergy verification. Appropriate equipment was determined to be in place for the procedure.     Moderation sedation was achieved with Versed (2mg) and Fentanyl (50mcg). Monitoring of the patients vital signs and respiratory status was provided by trained independent registered nurse during the entire course of the procedures and under my supervision and recoded in the patient’s medical record. The duration of sedation was over 10 minutes.     The patient's vital signs were carefully monitored before, throughout, and after the procedure.     In the fluoroscopy suite the patient was placed in a prone position, a pillow placed underneath their umbilicus. The skin was prepped and draped in the usual sterile fashion.     The fluoroscope was placed over the lumbar spine and adjusted into the proper AP/Oblique view to enter the transforaminal space just adjacent to the pedicle at the levels below. The targets for injection were then marked at the right and left L5-S1. A 27g 1.5 inch needle was placed into the marked site, and approximately 1mL of 1% Lidocaine was injected subcutaneously into the epidermal and dermal layers. The needle was removed intact.  A 25g 3.5 inch spinal needle was then placed and advanced under fluoroscopic guidance in an oblique view towards the epidural space of  the levels noted above. The needle position was confirmed to not be past the 6 o'clock position in the AP view and it was in the neuroforamen in the lateral view.     Under live fluoroscopic guidance in the AP view, contrast dye was used to highlight the epidural space spread of each level above. Final fluoroscopic images were saved.  Following negative aspiration, 1.5mL of 1% lidocaine preservative free with 0.5mL of 10mg/mL of dexamethasone was then injected at each level above, and the needles were removed intact after being restyleted. The patient's back was covered with a 4x4 gauze, the area was cleansed with sterile normal saline, and a dressing was applied. There were no complications noted.     The patient was then evaluated post-procedure, and was hemodynamically stable prior to leaving the post-operative care unit.     Follow-up as scheduled    Jenelle Nguyen MD  Interventional Pain and Spine  Physical Medicine and Rehabilitation  [facility]    Pain score prior to injection: 7/10 on NRS  Pain score immediately after injection: 3/10 on NRS  Greater than 50% relief of the patient's index pain was achieved after the procedure.    CPT codes  Transforaminal epidural injection- lumbar or sacral (first level):  22355-06  moderate procedural sedation first 15 minutes: 82418

## 2025-07-10 NOTE — INTERVAL H&P NOTE
Consented Procedure: RIGHT AND LEFT L5-S1 TRANSFORAMINAL EPIDURAL STEROID INJECTION  I have examined the patient, provided the risks, benefits, and alternatives to the procedure(s) indicated on the signed consent form, and the patient wishes to proceed.    H&P reviewed. The patient was examined and there are no changes to the H&P      Jenelle Nguyen M.D.  07/10/25 12:55 PM

## 2025-08-08 ENCOUNTER — APPOINTMENT (OUTPATIENT)
Dept: PHYSICAL MEDICINE AND REHAB | Facility: MEDICAL CENTER | Age: 25
End: 2025-08-08
Payer: COMMERCIAL

## (undated) DEVICE — PROTECTOR ULNA NERVE - (36PR/CA)

## (undated) DEVICE — SPONGE GAUZESTER. 2X2 4-PL - (2/PK 50PK/BX 30BX/CS)

## (undated) DEVICE — GLOVE BIOGEL INDICATOR SZ 6.5 SURGICAL PF LTX - (50PR/BX 4BX/CA)

## (undated) DEVICE — SUTURE 3-0 VICRYL PLUS SH - 8X 18 INCH (12/BX)

## (undated) DEVICE — SLEEVE, VASO, THIGH, MED

## (undated) DEVICE — KIT ROOM DECONTAMINATION

## (undated) DEVICE — TRAY SKIN SCRUB PVP WET (20EA/CA) PART #DYND70356 DISCONTINUED

## (undated) DEVICE — SUTURE 4-0 VICRYL PLUS FS-2 - 27 INCH (36/BX)

## (undated) DEVICE — HUMID-VENT HEAT AND MOISTURE EXCHANGE- (50/BX)

## (undated) DEVICE — STAPLER SKIN DISP - (6/BX 10BX/CA) VISISTAT

## (undated) DEVICE — DRAPE LAPAROTOMY T SHEET - (12EA/CA)

## (undated) DEVICE — SODIUM CHL IRRIGATION 0.9% 1000ML (12EA/CA)

## (undated) DEVICE — TOWEL STOP TIMEOUT SAFETY FLAG (40EA/CA)

## (undated) DEVICE — SHEET TRANSVERSE LAP - (12EA/CA)

## (undated) DEVICE — SUTURE GENERAL

## (undated) DEVICE — ELECTRODE DUAL RETURN W/ CORD - (50/PK)

## (undated) DEVICE — DRESSING TRANSPARENT FILM TEGADERM 4 X 4.75" (50EA/BX)"

## (undated) DEVICE — CANISTER SUCTION 3000ML MECHANICAL FILTER AUTO SHUTOFF MEDI-VAC NONSTERILE LF DISP  (40EA/CA)

## (undated) DEVICE — GOWN WARMING STANDARD FLEX - (30/CA)

## (undated) DEVICE — BAG SPONGE COUNT 10.25 X 32 - BLUE (250/CA)

## (undated) DEVICE — DRESSING TRANSPARENT FILM TEGADERM 2.375 X 2.75"  (100EA/BX)"

## (undated) DEVICE — NEPTUNE 4 PORT MANIFOLD - (20/PK)

## (undated) DEVICE — SUCTION INSTRUMENT YANKAUER BULBOUS TIP W/O VENT (50EA/CA)

## (undated) DEVICE — LACTATED RINGERS INJ 1000 ML - (14EA/CA 60CA/PF)

## (undated) DEVICE — TUBE CONNECTING SUCTION - CLEAR PLASTIC STERILE 72 IN (50EA/CA)

## (undated) DEVICE — SENSOR SPO2 NEO LNCS ADHESIVE (20/BX) SEE USER NOTES

## (undated) DEVICE — MASK ANESTHESIA ADULT  - (100/CA)

## (undated) DEVICE — SENSOR OXIMETER ADULT SPO2 RD SET (20EA/BX)

## (undated) DEVICE — SOD. CHL. INJ. 0.9% 250 ML - (36/CA 50CA/PF)

## (undated) DEVICE — BAG DECANTER (50EA/CS)

## (undated) DEVICE — GLOVE BIOGEL SZ 6.5 SURGICAL PF LTX (50PR/BX 4BX/CA)

## (undated) DEVICE — NEEDLE NON SAFETY 25 GA X 1 1/2 IN HYPO (100EA/BX)

## (undated) DEVICE — ELECTRODE 850 FOAM ADHESIVE - HYDROGEL RADIOTRNSPRNT (50/PK)

## (undated) DEVICE — KIT ANESTHESIA W/CIRCUIT & 3/LT BAG W/FILTER (20EA/CA)

## (undated) DEVICE — SET EXTENSION WITH 2 PORTS (48EA/CA) ***PART #2C8610 IS A SUBSTITUTE*****

## (undated) DEVICE — LEAD SET 6 DISP. EKG NIHON KOHDEN

## (undated) DEVICE — WATER IRRIGATION STERILE 1000ML (12EA/CA)

## (undated) DEVICE — SOD. CHL 10CC SYRINGE PREFILL - W/10 CC (30/BX)

## (undated) DEVICE — TUBING CLEARLINK DUO-VENT - C-FLO (48EA/CA)

## (undated) DEVICE — KIT CUSTOM PROCEDURE SINGLE FOR ENDO  (15/CA)

## (undated) DEVICE — CANISTER SUCTION RIGID RED 1500CC (40EA/CA)

## (undated) DEVICE — MASK AIRWAY SIZE 3 UNIQUE SILICON (10/BX)

## (undated) DEVICE — BLADE SURGICAL #15 - (50/BX 3BX/CA)

## (undated) DEVICE — BALLOON CRE WG 18-20MM 240CM 5.5 F G

## (undated) DEVICE — HEAD HOLDER JUNIOR/ADULT

## (undated) DEVICE — SPONGE GAUZESTER 4 X 4 4PLY - (128PK/CA)

## (undated) DEVICE — BOVIE NEEDLE TIP 3CM COLORADO

## (undated) DEVICE — COVER LIGHT HANDLE FLEXIBLE - SOFT (2EA/PK 80PK/CA)

## (undated) DEVICE — SYRINGE 10 ML CONTROL LL (25EA/BX 4BX/CA)

## (undated) DEVICE — BITE BLOCK ADULT 60FR (100EA/CA)

## (undated) DEVICE — PACK MINOR BASIN - (2EA/CA)

## (undated) DEVICE — CONTAINER, SPECIMEN, STERILE

## (undated) DEVICE — SET LEADWIRE 5 LEAD BEDSIDE DISPOSABLE ECG (1SET OF 5/EA)

## (undated) DEVICE — TOWELS CLOTH SURGICAL - (4/PK 20PK/CA)

## (undated) DEVICE — FORCEP RADIAL JAW 4 STANDARD CAPACITY W/NEEDLE 240CM (40EA/BX)

## (undated) DEVICE — BITEBLOCK ENDOSCOPIC PEDI. - (25/BX)

## (undated) DEVICE — SUTURE 2-0 PROLENE SH (36PK/BX)

## (undated) DEVICE — DRAPE C-ARM LARGE 41IN X 74 IN - (10/BX 2BX/CA)

## (undated) DEVICE — SHEET THYROID - (10EA/CA)